# Patient Record
Sex: FEMALE | Race: WHITE | Employment: FULL TIME | URBAN - METROPOLITAN AREA
[De-identification: names, ages, dates, MRNs, and addresses within clinical notes are randomized per-mention and may not be internally consistent; named-entity substitution may affect disease eponyms.]

---

## 2017-04-27 ENCOUNTER — HOSPITAL ENCOUNTER (EMERGENCY)
Age: 42
Discharge: HOME | End: 2017-04-27
Attending: EMERGENCY MEDICINE
Payer: MEDICAID

## 2017-04-27 VITALS
WEIGHT: 150 LBS | HEIGHT: 66 IN | DIASTOLIC BLOOD PRESSURE: 68 MMHG | OXYGEN SATURATION: 99 % | TEMPERATURE: 98.2 F | BODY MASS INDEX: 24.11 KG/M2 | RESPIRATION RATE: 18 BRPM | SYSTOLIC BLOOD PRESSURE: 146 MMHG | HEART RATE: 89 BPM

## 2017-04-27 DIAGNOSIS — R33.9 URINARY RETENTION: Primary | ICD-10-CM

## 2017-04-27 LAB
APPEARANCE UR: ABNORMAL
BACTERIA URNS QL MICRO: ABNORMAL /HPF
BILIRUB UR QL: NEGATIVE
CASTS URNS QL MICRO: 0 /LPF
COLOR UR: YELLOW
CRYSTALS URNS QL MICRO: 0 /LPF
EPI CELLS #/AREA URNS HPF: 0 /HPF
GLUCOSE UR STRIP.AUTO-MCNC: >1000 MG/DL
HGB UR QL STRIP: ABNORMAL
KETONES UR QL STRIP.AUTO: NEGATIVE MG/DL
LEUKOCYTE ESTERASE UR QL STRIP.AUTO: NEGATIVE
MUCOUS THREADS URNS QL MICRO: 0 /LPF
NITRITE UR QL STRIP.AUTO: NEGATIVE
OTHER OBSERVATIONS,UCOM: ABNORMAL
PH UR STRIP: 6.5 [PH] (ref 5–9)
PROT UR STRIP-MCNC: NEGATIVE MG/DL
RBC #/AREA URNS HPF: ABNORMAL /HPF
SP GR UR REFRACTOMETRY: 1.04 (ref 1–1.02)
UROBILINOGEN UR QL STRIP.AUTO: 0.2 EU/DL (ref 0.2–1)
WBC URNS QL MICRO: ABNORMAL /HPF

## 2017-04-27 PROCEDURE — 87086 URINE CULTURE/COLONY COUNT: CPT | Performed by: EMERGENCY MEDICINE

## 2017-04-27 PROCEDURE — 81015 MICROSCOPIC EXAM OF URINE: CPT | Performed by: EMERGENCY MEDICINE

## 2017-04-27 PROCEDURE — 74011250637 HC RX REV CODE- 250/637: Performed by: EMERGENCY MEDICINE

## 2017-04-27 PROCEDURE — 99283 EMERGENCY DEPT VISIT LOW MDM: CPT | Performed by: EMERGENCY MEDICINE

## 2017-04-27 PROCEDURE — 81001 URINALYSIS AUTO W/SCOPE: CPT | Performed by: EMERGENCY MEDICINE

## 2017-04-27 PROCEDURE — 51702 INSERT TEMP BLADDER CATH: CPT | Performed by: EMERGENCY MEDICINE

## 2017-04-27 PROCEDURE — 77030005515 HC CATH URETH FOL14 BARD -B

## 2017-04-27 PROCEDURE — 51798 US URINE CAPACITY MEASURE: CPT | Performed by: EMERGENCY MEDICINE

## 2017-04-27 RX ORDER — OXYCODONE HYDROCHLORIDE 5 MG/1
5 TABLET ORAL
Status: COMPLETED | OUTPATIENT
Start: 2017-04-27 | End: 2017-04-27

## 2017-04-27 RX ORDER — PHENAZOPYRIDINE HYDROCHLORIDE 95 MG/1
200 TABLET ORAL
Status: COMPLETED | OUTPATIENT
Start: 2017-04-27 | End: 2017-04-27

## 2017-04-27 RX ORDER — SULFAMETHOXAZOLE AND TRIMETHOPRIM 800; 160 MG/1; MG/1
1 TABLET ORAL 2 TIMES DAILY
Qty: 14 TAB | Refills: 0 | Status: SHIPPED | OUTPATIENT
Start: 2017-04-27 | End: 2017-05-04

## 2017-04-27 RX ORDER — SULFAMETHOXAZOLE AND TRIMETHOPRIM 800; 160 MG/1; MG/1
1 TABLET ORAL
Status: COMPLETED | OUTPATIENT
Start: 2017-04-27 | End: 2017-04-27

## 2017-04-27 RX ORDER — PHENAZOPYRIDINE HYDROCHLORIDE 200 MG/1
200 TABLET, FILM COATED ORAL 3 TIMES DAILY
Qty: 6 TAB | Refills: 0 | Status: SHIPPED | OUTPATIENT
Start: 2017-04-27 | End: 2017-04-29

## 2017-04-27 RX ADMIN — OXYCODONE HYDROCHLORIDE 5 MG: 5 TABLET ORAL at 14:04

## 2017-04-27 RX ADMIN — URINARY PAIN RELIEF 190 MG: 95 TABLET ORAL at 14:05

## 2017-04-27 RX ADMIN — SULFAMETHOXAZOLE AND TRIMETHOPRIM 1 TABLET: 800; 160 TABLET ORAL at 14:05

## 2017-04-27 NOTE — DISCHARGE INSTRUCTIONS
Urinary Retention: Care Instructions  Your Care Instructions    Urinary retention means that you aren't able to urinate. In men, it is often caused by a blockage of the urinary tract from an enlarged prostate gland. In men and women, it can also be caused by an infection or nerve damage. Or it may be a side effect of a medicine. The doctor may have drained the urine from your bladder. If you still have problems passing urine, you may need to use a catheter at home. This is used to empty your bladder until the problem can be fixed. Your doctor may put a catheter in your bladder before you go home. If so, he or she will tell you when to come back to have the catheter removed. The doctor has checked you closely. But problems can develop later. If you notice any problems or new symptoms, get medical treatment right away. Follow-up care is a key part of your treatment and safety. Be sure to make and go to all appointments, and call your doctor if you are having problems. It's also a good idea to know your test results and keep a list of the medicines you take. How can you care for yourself at home? · Take your medicines exactly as prescribed. Call your doctor if you think you are having a problem with your medicine. You will get more details on the specific medicines your doctor prescribes. · Check with your doctor before you use any over-the-counter medicines. Many cold and allergy medicines, for example, can make this problem worse. Make sure your doctor knows all of the medicines, vitamins, supplements, and herbal remedies you take. · Spread out through the day the amount of fluid you drink. Do not drink a lot at bedtime. · Avoid alcohol and caffeine. · If you have been given a catheter, or if one is already in place, follow the instructions you were given. Always wash your hands before and after you handle the catheter. When should you call for help?   Call your doctor now or seek immediate medical care if:  · You cannot urinate at all, or it is getting harder to urinate. · You have symptoms of a urinary tract infection. These may include:  ¨ Pain or burning when you urinate. ¨ A frequent need to urinate without being able to pass much urine. ¨ Pain in the flank, which is just below the rib cage and above the waist on either side of the back. ¨ Blood in your urine. ¨ A fever. Watch closely for changes in your health, and be sure to contact your doctor if:  · You have any problems with your catheter. · You do not get better as expected. Where can you learn more? Go to http://jose carlos-beka.info/. Enter M244 in the search box to learn more about \"Urinary Retention: Care Instructions. \"  Current as of: August 12, 2016  Content Version: 11.2  © 1365-0961 [employer]. Care instructions adapted under license by [employer] (which disclaims liability or warranty for this information). If you have questions about a medical condition or this instruction, always ask your healthcare professional. [place] [address] any warranty or liability for your use of this information.

## 2017-04-27 NOTE — ED PROVIDER NOTES
Patient is a 35 y.o. female presenting with inability to urinate. The history is provided by the patient. Urinary Retention    This is a recurrent problem. The current episode started yesterday. The problem occurs constantly. The problem has been gradually worsening. Associated with: patient with prior episodes of urinary retention. The pain is located in the suprapubic region. The quality of the pain is aching and pressure-like. The pain is moderate. Pertinent negatives include no fever, no diarrhea, no flatus, no melena, no nausea, no vomiting, no constipation, no dysuria, no frequency, no hematuria, no headaches, no arthralgias, no myalgias and no chest pain. The pain is worsened by activity. The pain is relieved by nothing. Past medical history comments: history of urinary retention has required Moody catheterization in the past. The patient's surgical history non-contributory. Past Medical History:   Diagnosis Date    Bicuspid aortic valve     CAD (coronary artery disease)     Chronic pain 3/14/2016    Cigarette smoker 11/22/2016    Hypertension     Insomnia 3/14/2016    Obesity (BMI 30-39.9) 3/14/2016    Psychiatric disorder     anxiety , ADHD    Unspecified adverse effect of anesthesia     high anxiety after surgery, hiper       Past Surgical History:   Procedure Laterality Date    HX HYSTERECTOMY           Family History:   Problem Relation Age of Onset    Other Mother      bipolar disorder    COPD Mother     Diabetes Father     Hypertension Father     Diabetes Sister        Social History     Social History    Marital status:      Spouse name: N/A    Number of children: N/A    Years of education: N/A     Occupational History    Not on file.      Social History Main Topics    Smoking status: Current Every Day Smoker     Packs/day: 0.50    Smokeless tobacco: Never Used    Alcohol use No    Drug use: No    Sexual activity: Not on file     Other Topics Concern    Not on file     Social History Narrative    , not working. 2 children, 5 grandchildren         ALLERGIES: Lortab [hydrocodone-acetaminophen]    Review of Systems   Constitutional: Negative for chills and fever. HENT: Negative for congestion, ear pain and rhinorrhea. Eyes: Negative for photophobia and discharge. Respiratory: Negative for cough and shortness of breath. Cardiovascular: Negative for chest pain and palpitations. Gastrointestinal: Positive for abdominal distention and abdominal pain. Negative for constipation, diarrhea, flatus, melena, nausea and vomiting. Endocrine: Negative for cold intolerance and heat intolerance. Genitourinary: Positive for difficulty urinating. Negative for dysuria, enuresis, flank pain, frequency and hematuria. Musculoskeletal: Negative for arthralgias, myalgias and neck pain. Skin: Negative for rash and wound. Allergic/Immunologic: Negative for environmental allergies and food allergies. Neurological: Negative for syncope and headaches. Hematological: Negative for adenopathy. Does not bruise/bleed easily. Psychiatric/Behavioral: Negative for dysphoric mood. The patient is not nervous/anxious. All other systems reviewed and are negative. Vitals:    04/27/17 1226 04/27/17 1312   BP: 152/84    Pulse: 90    Resp: 16    Temp: 98.9 °F (37.2 °C)    SpO2: 99% 98%   Weight: 68 kg (150 lb)    Height: 5' 6\" (1.676 m)             Physical Exam   Constitutional: She is oriented to person, place, and time. She appears well-developed and well-nourished. She appears distressed. HENT:   Head: Normocephalic and atraumatic. Mouth/Throat: Oropharynx is clear and moist.   Eyes: Conjunctivae and EOM are normal. Pupils are equal, round, and reactive to light. Right eye exhibits no discharge. Left eye exhibits no discharge. No scleral icterus. Neck: Normal range of motion. Neck supple. No thyromegaly present.    Cardiovascular: Normal rate, regular rhythm, normal heart sounds and intact distal pulses. Exam reveals no gallop and no friction rub. No murmur heard. Pulmonary/Chest: Effort normal and breath sounds normal. No respiratory distress. She has no wheezes. She exhibits no tenderness. Abdominal: Soft. Bowel sounds are normal. She exhibits distension. There is no hepatosplenomegaly. There is tenderness. There is no rebound and no guarding. No hernia. Musculoskeletal: Normal range of motion. She exhibits no edema or tenderness. Neurological: She is alert and oriented to person, place, and time. No cranial nerve deficit. She exhibits normal muscle tone. Skin: Skin is warm. No rash noted. She is not diaphoretic. No erythema. Psychiatric: She has a normal mood and affect. Her behavior is normal. Judgment and thought content normal.   Nursing note and vitals reviewed. MDM  Number of Diagnoses or Management Options  Urinary retention: established and worsening  Diagnosis management comments: Bladder scan completed. Greater than 600 mL of urine found. Proceed to Moody catheter  Patient's symptoms improved    Will refer patient back to her primary urologist for reevaluation  Urine culture sent       Amount and/or Complexity of Data Reviewed  Clinical lab tests: ordered and reviewed  Tests in the medicine section of CPT®: ordered and reviewed  Review and summarize past medical records: yes    Risk of Complications, Morbidity, and/or Mortality  Presenting problems: moderate  Diagnostic procedures: moderate  Management options: moderate  General comments: Elements of this note have been dictated via voice recognition software. Text and phrases may be limited by the accuracy of the software. The chart has been reviewed, but errors may still be present.       Patient Progress  Patient progress: improved    ED Course       Procedures

## 2017-04-27 NOTE — ED NOTES
Discharge instructions and prescriptions provided and explained to the pt. Opportunity for questions provided. A signed copy of AVS was placed in chart.

## 2017-04-27 NOTE — ED TRIAGE NOTES
Per patient urinary retention since 0130 this morning, patient hx of urinary retention last time this occurred 2 years ago.

## 2017-04-30 LAB
BACTERIA SPEC CULT: NORMAL
SERVICE CMNT-IMP: NORMAL

## 2017-09-26 ENCOUNTER — APPOINTMENT (OUTPATIENT)
Dept: GENERAL RADIOLOGY | Age: 42
End: 2017-09-26
Payer: SELF-PAY

## 2017-09-26 ENCOUNTER — HOSPITAL ENCOUNTER (EMERGENCY)
Age: 42
Discharge: HOME | End: 2017-09-26
Payer: SELF-PAY

## 2017-09-26 VITALS
RESPIRATION RATE: 16 BRPM | BODY MASS INDEX: 31.39 KG/M2 | HEIGHT: 67 IN | WEIGHT: 200 LBS | DIASTOLIC BLOOD PRESSURE: 84 MMHG | HEART RATE: 78 BPM | TEMPERATURE: 97.4 F | OXYGEN SATURATION: 98 % | SYSTOLIC BLOOD PRESSURE: 133 MMHG

## 2017-09-26 DIAGNOSIS — M62.838 TRAPEZIUS MUSCLE SPASM: Primary | ICD-10-CM

## 2017-09-26 PROCEDURE — 72040 X-RAY EXAM NECK SPINE 2-3 VW: CPT

## 2017-09-26 PROCEDURE — 99284 EMERGENCY DEPT VISIT MOD MDM: CPT

## 2017-09-26 PROCEDURE — 75810000123 HC INJ'S ANES/STEROID AGT PERIPH NERVE

## 2017-09-26 PROCEDURE — 74011000250 HC RX REV CODE- 250

## 2017-09-26 PROCEDURE — 74011250637 HC RX REV CODE- 250/637

## 2017-09-26 RX ORDER — METFORMIN HYDROCHLORIDE 500 MG/1
500 TABLET ORAL 2 TIMES DAILY WITH MEALS
COMMUNITY
End: 2018-11-12 | Stop reason: SDUPTHER

## 2017-09-26 RX ORDER — HYDROCODONE BITARTRATE AND ACETAMINOPHEN 7.5; 325 MG/1; MG/1
1 TABLET ORAL
Status: COMPLETED | OUTPATIENT
Start: 2017-09-26 | End: 2017-09-26

## 2017-09-26 RX ORDER — NAPROXEN 500 MG/1
500 TABLET ORAL 2 TIMES DAILY WITH MEALS
Qty: 20 TAB | Refills: 0 | Status: SHIPPED | OUTPATIENT
Start: 2017-09-26 | End: 2017-10-06

## 2017-09-26 RX ORDER — TAMSULOSIN HYDROCHLORIDE 0.4 MG/1
0.4 CAPSULE ORAL DAILY
COMMUNITY
End: 2018-11-12 | Stop reason: ALTCHOICE

## 2017-09-26 RX ORDER — INSULIN ASPART 100 [IU]/ML
20 INJECTION, SUSPENSION SUBCUTANEOUS 2 TIMES DAILY
COMMUNITY
End: 2019-06-24 | Stop reason: ALTCHOICE

## 2017-09-26 RX ORDER — LIDOCAINE HYDROCHLORIDE 20 MG/ML
10 INJECTION, SOLUTION INFILTRATION; PERINEURAL
Status: COMPLETED | OUTPATIENT
Start: 2017-09-26 | End: 2017-09-26

## 2017-09-26 RX ORDER — CELECOXIB 200 MG/1
200 CAPSULE ORAL 2 TIMES DAILY
COMMUNITY
End: 2018-11-12 | Stop reason: ALTCHOICE

## 2017-09-26 RX ORDER — METHOCARBAMOL 750 MG/1
750 TABLET, FILM COATED ORAL 4 TIMES DAILY
Qty: 15 TAB | Refills: 0 | Status: SHIPPED | OUTPATIENT
Start: 2017-09-26 | End: 2018-11-12 | Stop reason: ALTCHOICE

## 2017-09-26 RX ORDER — ATORVASTATIN CALCIUM 20 MG/1
20 TABLET, FILM COATED ORAL DAILY
COMMUNITY
End: 2019-06-24 | Stop reason: ALTCHOICE

## 2017-09-26 RX ORDER — DIAZEPAM 5 MG/1
5 TABLET ORAL
Status: COMPLETED | OUTPATIENT
Start: 2017-09-26 | End: 2017-09-26

## 2017-09-26 RX ADMIN — HYDROCODONE BITARTRATE AND ACETAMINOPHEN 1 TABLET: 7.5; 325 TABLET ORAL at 10:43

## 2017-09-26 RX ADMIN — DIAZEPAM 5 MG: 5 TABLET ORAL at 10:42

## 2017-09-26 RX ADMIN — LIDOCAINE HYDROCHLORIDE 200 MG: 20 INJECTION, SOLUTION INFILTRATION; PERINEURAL at 10:37

## 2017-09-26 NOTE — ED PROVIDER NOTES
Patient is a 35 y.o. female presenting with shoulder pain. The history is provided by the patient. Shoulder Pain    The incident occurred more than 2 days ago. The incident occurred at home. There was no injury mechanism. The right shoulder is affected. The pain is at a severity of 10/10. The pain is severe. The pain has been constant since onset. The pain radiates. There is no history of shoulder injury. Associated symptoms include tingling. Past Medical History:   Diagnosis Date    Bicuspid aortic valve     CAD (coronary artery disease)     Chronic pain 3/14/2016    Cigarette smoker 11/22/2016    Hypertension     Insomnia 3/14/2016    Obesity (BMI 30-39.9) 3/14/2016    Psychiatric disorder     anxiety , ADHD    Unspecified adverse effect of anesthesia     high anxiety after surgery, hiper       Past Surgical History:   Procedure Laterality Date    HX HYSTERECTOMY           Family History:   Problem Relation Age of Onset    Other Mother      bipolar disorder    COPD Mother     Diabetes Father     Hypertension Father     Diabetes Sister        Social History     Social History    Marital status:      Spouse name: N/A    Number of children: N/A    Years of education: N/A     Occupational History    Not on file. Social History Main Topics    Smoking status: Current Every Day Smoker     Packs/day: 0.50    Smokeless tobacco: Never Used    Alcohol use No    Drug use: No    Sexual activity: Not on file     Other Topics Concern    Not on file     Social History Narrative    , not working. 2 children, 5 grandchildren         ALLERGIES: Lortab [hydrocodone-acetaminophen]    Review of Systems   Constitutional: Negative. Negative for activity change. HENT: Negative. Eyes: Negative. Respiratory: Negative. Cardiovascular: Negative. Gastrointestinal: Negative. Genitourinary: Negative. Musculoskeletal: Negative. Skin: Negative.     Neurological: Positive for tingling. Psychiatric/Behavioral: Negative. All other systems reviewed and are negative. Vitals:    09/26/17 1017   BP: 154/85   Pulse: 89   Resp: 20   Temp: 97.4 °F (36.3 °C)   SpO2: 100%   Weight: 90.7 kg (200 lb)   Height: 5' 7\" (1.702 m)            Physical Exam   Constitutional: She is oriented to person, place, and time. She appears well-developed and well-nourished. No distress. HENT:   Head: Normocephalic and atraumatic. Right Ear: External ear normal.   Left Ear: External ear normal.   Nose: Nose normal.   Mouth/Throat: Oropharynx is clear and moist. No oropharyngeal exudate. Eyes: Conjunctivae and EOM are normal. Pupils are equal, round, and reactive to light. Right eye exhibits no discharge. Left eye exhibits no discharge. No scleral icterus. Neck: Normal range of motion. Neck supple. No JVD present. No tracheal deviation present. Cardiovascular: Normal rate, regular rhythm and intact distal pulses. Pulmonary/Chest: Effort normal and breath sounds normal. No stridor. No respiratory distress. She has no wheezes. She exhibits no tenderness. Abdominal: Soft. Bowel sounds are normal. She exhibits no distension and no mass. There is no tenderness. Musculoskeletal: Normal range of motion. She exhibits no edema. Cervical back: She exhibits tenderness, pain and spasm. Back:    Neurological: She is alert and oriented to person, place, and time. No cranial nerve deficit. Skin: Skin is warm and dry. No rash noted. She is not diaphoretic. No erythema. No pallor. Psychiatric: She has a normal mood and affect. Her behavior is normal. Thought content normal.   Nursing note and vitals reviewed. MDM  Number of Diagnoses or Management Options  Diagnosis management comments: Muscle quite tight and tender trapezius muscle lidocaine injection was provided the patient for possible relief. C-spine is normal patient be started on anti-inflammatories and muscle relaxers. Follow-up with physical therapy       Amount and/or Complexity of Data Reviewed  Tests in the radiology section of CPT®: ordered and reviewed      ED Course       Trigger point injection  Date/Time: 9/26/2017 10:54 AM  Performed by: Denise Davis  Authorized by: Denise Davis     Consent:     Consent obtained:  Verbal    Consent given by:  Patient    Risks discussed:  Bleeding, infection, nerve damage and pain    Alternatives discussed:  No treatment  Indications:     Indications:  Uscle pain and spasm  Pre-procedure details:     Skin preparation:  Antiseptic wash    Preparation: Patient was prepped and draped in the usual sterile fashion    Anesthesia (see MAR for exact dosages): Anesthesia method:  None  Post-procedure details:     Patient tolerance of procedure: Tolerated well, no immediate complications  Comments:      Trapezius muscle injected with we'll injection patient did get some relief from pain.

## 2017-09-26 NOTE — ED TRIAGE NOTES
Peace Leonard is a 35 y.o. female here for right sided neck pain that started Sunday night. She states she had a sharp pain in her right shoulder and it radiates into her neck. She denies known injury. Patient noted to have right sided muscular  tenderness on examination. Patient is alert and oriented she is answering question appropriately. Rapid assessment performed. --- Orders were not placed. --- Patient will be roomed.    Signed By: MARTINA Rogel     September 26, 2017

## 2017-09-26 NOTE — DISCHARGE INSTRUCTIONS
Neck Spasm: Care Instructions  Your Care Instructions  A neck spasm is sudden tightness and pain in your neck muscles. A spasm may be caused by some activities or repeated movements. For example, you may be more likely to have a neck spasm if you slouch, paint a ceiling, work at a computer, or sleep with your neck twisted. But the cause isn't always clear. Home treatment includes using heat or ice, taking over-the-counter (OTC) pain medicines, and avoiding activities that may lead to neck pain. Gentle stretching, or treatments such as massage or manipulation, may also help ease a neck spasm. For a neck spasm that doesn't get better with home care, your doctor may prescribe medicine. He or she may also suggest exercise or physical therapy to help strengthen or relax your neck muscles. Follow-up care is a key part of your treatment and safety. Be sure to make and go to all appointments, and call your doctor if you are having problems. It's also a good idea to know your test results and keep a list of the medicines you take. How can you care for yourself at home? · To relieve pain, use heat or ice (whichever feels better) on the affected area. ¨ Put a warm water bottle, a heating pad set on low, or a warm cloth on your neck. Put a thin cloth between the heating pad and your skin. Do not go to sleep with a heating pad on your skin. ¨ Try ice or a cold pack on the area for 10 to 20 minutes at a time. Put a thin cloth between the ice and your skin. · Ask your doctor if you can take acetaminophen (such as Tylenol) or nonsteroidal anti-inflammatory drugs, such as ibuprofen or naproxen. Your doctor can prescribe stronger medicines if needed. Be safe with medicines. Read and follow all instructions on the label. · Stretch your muscles every day, especially before and after exercise and at bedtime. Regular stretching can help relax your muscles.   · Try to find a pillow and a position in bed that help improve your night's rest.  · Try to stay active. It's best to start activity slowly. If an exercise makes your pain worse, stop doing it. When should you call for help? Call 911 anytime you think you may need emergency care. For example, call if:  · You are unable to move an arm or a leg at all. Call your doctor now or seek immediate medical care if:  · You have new or worse symptoms in your arms, legs, belly, or buttocks. Symptoms may include:  ¨ Numbness or tingling. ¨ Weakness. ¨ Pain. · You lose bladder or bowel control. Watch closely for changes in your health, and be sure to contact your doctor if:  · You do not get better as expected. Where can you learn more? Go to http://jose carlos-beka.info/. Enter G184 in the search box to learn more about \"Neck Spasm: Care Instructions. \"  Current as of: March 21, 2017  Content Version: 11.3  © 8223-9972 [employer]. Care instructions adapted under license by [employer] (which disclaims liability or warranty for this information). If you have questions about a medical condition or this instruction, always ask your healthcare professional. [address] any warranty or liability for your use of this information.

## 2017-09-26 NOTE — ED NOTES
I have reviewed discharge instructions with the patient and spouse. The patient and spouse verbalized understanding. The patient is ambulatory upon exit and is in no acute distress. The patient has discharge instructions and information about where prescriptions were sent. The patient is being driven home by  is they do not have any questions at this time.

## 2017-09-26 NOTE — ED TRIAGE NOTES
Pt states that on Sunday night had a pain from her right shoulder to her neck and head - pt denies any falls or injuries - pt speaking in complete sentences and ambulatory without difficulty

## 2018-11-12 PROBLEM — Z79.4 CONTROLLED TYPE 2 DIABETES MELLITUS WITHOUT COMPLICATION, WITH LONG-TERM CURRENT USE OF INSULIN (HCC): Status: ACTIVE | Noted: 2018-11-12

## 2018-11-12 PROBLEM — E11.9 CONTROLLED TYPE 2 DIABETES MELLITUS WITHOUT COMPLICATION, WITH LONG-TERM CURRENT USE OF INSULIN (HCC): Status: ACTIVE | Noted: 2018-11-12

## 2018-11-12 PROBLEM — E78.00 HYPERCHOLESTEROLEMIA: Status: ACTIVE | Noted: 2018-11-12

## 2018-11-21 ENCOUNTER — HOSPITAL ENCOUNTER (OUTPATIENT)
Dept: NON INVASIVE DIAGNOSTICS | Age: 43
Discharge: HOME | End: 2018-11-21
Attending: INTERNAL MEDICINE
Payer: MEDICARE

## 2018-11-21 DIAGNOSIS — Q23.1 BICUSPID AORTIC VALVE: ICD-10-CM

## 2018-11-21 PROCEDURE — 93306 TTE W/DOPPLER COMPLETE: CPT

## 2018-11-26 ENCOUNTER — HOSPITAL ENCOUNTER (OUTPATIENT)
Dept: CT IMAGING | Age: 43
Discharge: HOME | End: 2018-11-26
Attending: INTERNAL MEDICINE
Payer: MEDICARE

## 2018-11-26 DIAGNOSIS — I71.21 ASCENDING AORTIC ANEURYSM: ICD-10-CM

## 2018-11-26 PROCEDURE — 74011000258 HC RX REV CODE- 258

## 2018-11-26 PROCEDURE — 74011636320 HC RX REV CODE- 636/320

## 2018-11-26 PROCEDURE — 71275 CT ANGIOGRAPHY CHEST: CPT

## 2018-11-26 RX ORDER — SODIUM CHLORIDE 0.9 % (FLUSH) 0.9 %
10 SYRINGE (ML) INJECTION
Status: COMPLETED | OUTPATIENT
Start: 2018-11-26 | End: 2018-11-26

## 2018-11-26 RX ADMIN — SODIUM CHLORIDE 100 ML: 900 INJECTION, SOLUTION INTRAVENOUS at 08:09

## 2018-11-26 RX ADMIN — Medication 10 ML: at 08:09

## 2018-11-26 RX ADMIN — IOPAMIDOL 119 ML: 755 INJECTION, SOLUTION INTRAVENOUS at 08:09

## 2018-11-26 NOTE — PROGRESS NOTES
Please let her know that the CT of her chest showed that the aneuysm was unchanged which is good news. However, she needs to re-establish with cardiothoracic surgery. Thanks!

## 2018-11-28 NOTE — PROGRESS NOTES
Attempted to contact patient but subscriber is not accepting calls per recording. No VM option given.

## 2018-11-29 NOTE — PROGRESS NOTES
Patient notified of results and verbalized understanding. States she saw her cardio-thoracic surgeon yesterday.

## 2019-07-18 ENCOUNTER — HOSPITAL ENCOUNTER (OUTPATIENT)
Dept: MRI IMAGING | Age: 44
Discharge: HOME | End: 2019-07-18
Attending: FAMILY MEDICINE
Payer: MEDICARE

## 2019-07-18 DIAGNOSIS — G43.809 OTHER MIGRAINE WITHOUT STATUS MIGRAINOSUS, NOT INTRACTABLE: ICD-10-CM

## 2019-07-18 DIAGNOSIS — R11.0 NAUSEA: ICD-10-CM

## 2019-07-18 DIAGNOSIS — R63.4 WEIGHT LOSS: ICD-10-CM

## 2019-07-18 DIAGNOSIS — I10 ESSENTIAL HYPERTENSION: ICD-10-CM

## 2019-07-18 PROCEDURE — 70551 MRI BRAIN STEM W/O DYE: CPT

## 2019-07-18 NOTE — PROGRESS NOTES
She has some minor findings on her brain MRI that I need to discuss with her by phone; please contact the patient and let me know and I need to talk to her; she will most likely need to see a neurologist; thank you, JF

## 2019-07-22 ENCOUNTER — HOSPITAL ENCOUNTER (OUTPATIENT)
Dept: LAB | Age: 44
Discharge: HOME | End: 2019-07-22
Payer: MEDICARE

## 2019-07-22 DIAGNOSIS — D69.6 THROMBOCYTOPENIA (HCC): ICD-10-CM

## 2019-07-22 DIAGNOSIS — R63.4 UNINTENTIONAL WEIGHT LOSS: ICD-10-CM

## 2019-07-22 LAB
ALBUMIN SERPL-MCNC: 4.2 G/DL (ref 3.5–5)
ALBUMIN/GLOB SERPL: 1.4 {RATIO} (ref 1.2–3.5)
ALP SERPL-CCNC: 89 U/L (ref 50–136)
ALT SERPL-CCNC: 15 U/L (ref 12–65)
ANION GAP SERPL CALC-SCNC: 5 MMOL/L (ref 7–16)
APTT PPP: 30 SEC (ref 24.7–39.8)
AST SERPL-CCNC: 10 U/L (ref 15–37)
BASOPHILS # BLD: 0.1 K/UL (ref 0–0.2)
BASOPHILS NFR BLD: 1 % (ref 0–2)
BILIRUB SERPL-MCNC: 0.3 MG/DL (ref 0.2–1.1)
BUN SERPL-MCNC: 15 MG/DL (ref 6–23)
CALCIUM SERPL-MCNC: 8.9 MG/DL (ref 8.3–10.4)
CHLORIDE SERPL-SCNC: 109 MMOL/L (ref 98–107)
CO2 SERPL-SCNC: 26 MMOL/L (ref 21–32)
CREAT SERPL-MCNC: 0.75 MG/DL (ref 0.6–1)
DIFFERENTIAL METHOD BLD: ABNORMAL
EOSINOPHIL # BLD: 0.3 K/UL (ref 0–0.8)
EOSINOPHIL NFR BLD: 4 % (ref 0.5–7.8)
ERYTHROCYTE [DISTWIDTH] IN BLOOD BY AUTOMATED COUNT: 12.4 % (ref 11.9–14.6)
FIBRINOGEN PPP-MCNC: 377 MG/DL (ref 190–501)
GLOBULIN SER CALC-MCNC: 2.9 G/DL (ref 2.3–3.5)
GLUCOSE SERPL-MCNC: 97 MG/DL (ref 65–100)
HCT VFR BLD AUTO: 40.7 % (ref 35.8–46.3)
HGB BLD-MCNC: 14 G/DL (ref 11.7–15.4)
IMM GRANULOCYTES # BLD AUTO: 0 K/UL (ref 0–0.5)
IMM GRANULOCYTES NFR BLD AUTO: 0 % (ref 0–5)
INR PPP: 1
LYMPHOCYTES # BLD: 1.7 K/UL (ref 0.5–4.6)
LYMPHOCYTES NFR BLD: 24 % (ref 13–44)
MCH RBC QN AUTO: 33.2 PG (ref 26.1–32.9)
MCHC RBC AUTO-ENTMCNC: 34.4 G/DL (ref 31.4–35)
MCV RBC AUTO: 96.4 FL (ref 79.6–97.8)
MONOCYTES # BLD: 0.4 K/UL (ref 0.1–1.3)
MONOCYTES NFR BLD: 5 % (ref 4–12)
NEUTS SEG # BLD: 4.7 K/UL (ref 1.7–8.2)
NEUTS SEG NFR BLD: 66 % (ref 43–78)
NRBC # BLD: 0 K/UL (ref 0–0.2)
PLATELET # BLD AUTO: 82 K/UL (ref 150–450)
PLATELET COMMENTS,PCOM: ADEQUATE
PMV BLD AUTO: ABNORMAL FL (ref 9.4–12.3)
POTASSIUM SERPL-SCNC: 3.7 MMOL/L (ref 3.5–5.1)
PROT SERPL-MCNC: 7.1 G/DL (ref 6.3–8.2)
PROTHROMBIN TIME: 12.9 SEC (ref 11.7–14.5)
RBC # BLD AUTO: 4.22 M/UL (ref 4.05–5.25)
RBC MORPH BLD: ABNORMAL
SODIUM SERPL-SCNC: 140 MMOL/L (ref 136–145)
T4 FREE SERPL-MCNC: 0.9 NG/DL (ref 0.78–1.46)
TSH SERPL DL<=0.005 MIU/L-ACNC: 1.14 UIU/ML (ref 0.36–3.74)
WBC # BLD AUTO: 7.2 K/UL (ref 4.3–11.1)
WBC MORPH BLD: ABNORMAL

## 2019-07-22 PROCEDURE — 85384 FIBRINOGEN ACTIVITY: CPT

## 2019-07-22 PROCEDURE — 85025 COMPLETE CBC W/AUTO DIFF WBC: CPT

## 2019-07-22 PROCEDURE — 85730 THROMBOPLASTIN TIME PARTIAL: CPT

## 2019-07-22 PROCEDURE — 84165 PROTEIN E-PHORESIS SERUM: CPT

## 2019-07-22 PROCEDURE — 85610 PROTHROMBIN TIME: CPT

## 2019-07-22 PROCEDURE — 80053 COMPREHEN METABOLIC PANEL: CPT

## 2019-07-22 PROCEDURE — 86334 IMMUNOFIX E-PHORESIS SERUM: CPT

## 2019-07-22 PROCEDURE — 84439 ASSAY OF FREE THYROXINE: CPT

## 2019-07-22 PROCEDURE — 36415 COLL VENOUS BLD VENIPUNCTURE: CPT

## 2019-07-22 PROCEDURE — 84443 ASSAY THYROID STIM HORMONE: CPT

## 2019-07-23 LAB
ALBUMIN SERPL ELPH-MCNC: 3.87 G/DL (ref 3.2–5.6)
ALBUMIN/GLOB SERPL: 1.4 {RATIO}
ALPHA1 GLOB SERPL ELPH-MCNC: 0.26 G/DL (ref 0.1–0.4)
ALPHA2 GLOB SERPL ELPH-MCNC: 0.81 G/DL (ref 0.4–1.2)
B-GLOBULIN SERPL QL ELPH: 0.94 G/DL (ref 0.6–1.3)
GAMMA GLOB MFR SERPL ELPH: 0.83 G/DL (ref 0.5–1.6)
IGA SERPL-MCNC: 189 MG/DL (ref 85–499)
IGG SERPL-MCNC: 761 MG/DL (ref 610–1616)
IGM SERPL-MCNC: 81 MG/DL (ref 35–242)
M PROTEIN SERPL ELPH-MCNC: NORMAL G/DL
PROT PATTERN SERPL ELPH-IMP: NORMAL
PROT PATTERN SPEC IFE-IMP: NORMAL
PROT SERPL-MCNC: 6.7 G/DL (ref 6.3–8.2)

## 2019-07-24 LAB — PATH REV BLD -IMP: NORMAL

## 2019-08-12 ENCOUNTER — HOSPITAL ENCOUNTER (OUTPATIENT)
Dept: CT IMAGING | Age: 44
Discharge: HOME | End: 2019-08-12
Attending: INTERNAL MEDICINE
Payer: MEDICARE

## 2019-08-12 VITALS — BODY MASS INDEX: 23.86 KG/M2 | WEIGHT: 152 LBS | HEIGHT: 67 IN

## 2019-08-12 DIAGNOSIS — R63.4 UNINTENTIONAL WEIGHT LOSS: ICD-10-CM

## 2019-08-12 DIAGNOSIS — Z03.89 ENCOUNTER FOR OBSERVATION FOR OTHER SUSPECTED DISEASES AND CONDITIONS RULED OUT: ICD-10-CM

## 2019-08-12 PROCEDURE — 74011000258 HC RX REV CODE- 258: Performed by: INTERNAL MEDICINE

## 2019-08-12 PROCEDURE — 74177 CT ABD & PELVIS W/CONTRAST: CPT

## 2019-08-12 PROCEDURE — 74011636320 HC RX REV CODE- 636/320: Performed by: INTERNAL MEDICINE

## 2019-08-12 RX ORDER — SODIUM CHLORIDE 0.9 % (FLUSH) 0.9 %
10 SYRINGE (ML) INJECTION
Status: COMPLETED | OUTPATIENT
Start: 2019-08-12 | End: 2019-08-12

## 2019-08-12 RX ADMIN — DIATRIZOATE MEGLUMINE AND DIATRIZOATE SODIUM 15 ML: 660; 100 LIQUID ORAL; RECTAL at 13:28

## 2019-08-12 RX ADMIN — Medication 10 ML: at 13:29

## 2019-08-12 RX ADMIN — IOPAMIDOL 100 ML: 755 INJECTION, SOLUTION INTRAVENOUS at 13:29

## 2019-08-12 RX ADMIN — SODIUM CHLORIDE 100 ML: 900 INJECTION, SOLUTION INTRAVENOUS at 13:29

## 2019-08-19 ENCOUNTER — HOSPITAL ENCOUNTER (OUTPATIENT)
Dept: LAB | Age: 44
Discharge: HOME | End: 2019-08-19
Payer: MEDICARE

## 2019-08-19 DIAGNOSIS — D69.6 THROMBOCYTOPENIA (HCC): ICD-10-CM

## 2019-08-19 LAB
ALBUMIN SERPL-MCNC: 3.8 G/DL (ref 3.5–5)
ALBUMIN/GLOB SERPL: 1.3 {RATIO} (ref 1.2–3.5)
ALP SERPL-CCNC: 87 U/L (ref 50–136)
ALT SERPL-CCNC: 14 U/L (ref 12–65)
ANION GAP SERPL CALC-SCNC: 5 MMOL/L (ref 7–16)
AST SERPL-CCNC: 13 U/L (ref 15–37)
BASOPHILS # BLD: 0.1 K/UL (ref 0–0.2)
BASOPHILS NFR BLD: 1 % (ref 0–2)
BILIRUB SERPL-MCNC: 0.3 MG/DL (ref 0.2–1.1)
BUN SERPL-MCNC: 16 MG/DL (ref 6–23)
CALCIUM SERPL-MCNC: 9.1 MG/DL (ref 8.3–10.4)
CHLORIDE SERPL-SCNC: 110 MMOL/L (ref 98–107)
CO2 SERPL-SCNC: 26 MMOL/L (ref 21–32)
CREAT SERPL-MCNC: 0.8 MG/DL (ref 0.6–1)
DIFFERENTIAL METHOD BLD: ABNORMAL
EOSINOPHIL # BLD: 0.6 K/UL (ref 0–0.8)
EOSINOPHIL NFR BLD: 5 % (ref 0.5–7.8)
ERYTHROCYTE [DISTWIDTH] IN BLOOD BY AUTOMATED COUNT: 12.2 % (ref 11.9–14.6)
ERYTHROCYTE [SEDIMENTATION RATE] IN BLOOD: 13 MM/HR (ref 0–20)
GLOBULIN SER CALC-MCNC: 3 G/DL (ref 2.3–3.5)
GLUCOSE SERPL-MCNC: 112 MG/DL (ref 65–100)
HCT VFR BLD AUTO: 42.3 % (ref 35.8–46.3)
HGB BLD-MCNC: 14.6 G/DL (ref 11.7–15.4)
IMM GRANULOCYTES # BLD AUTO: 0 K/UL (ref 0–0.5)
IMM GRANULOCYTES NFR BLD AUTO: 0 % (ref 0–5)
LYMPHOCYTES # BLD: 2.9 K/UL (ref 0.5–4.6)
LYMPHOCYTES NFR BLD: 26 % (ref 13–44)
MCH RBC QN AUTO: 33.3 PG (ref 26.1–32.9)
MCHC RBC AUTO-ENTMCNC: 34.5 G/DL (ref 31.4–35)
MCV RBC AUTO: 96.4 FL (ref 79.6–97.8)
MONOCYTES # BLD: 0.8 K/UL (ref 0.1–1.3)
MONOCYTES NFR BLD: 7 % (ref 4–12)
NEUTS SEG # BLD: 6.7 K/UL (ref 1.7–8.2)
NEUTS SEG NFR BLD: 61 % (ref 43–78)
NRBC # BLD: 0 K/UL (ref 0–0.2)
PLATELET # BLD AUTO: 131 K/UL (ref 150–450)
PLATELET COMMENTS,PCOM: ADEQUATE
PMV BLD AUTO: ABNORMAL FL (ref 9.4–12.3)
POTASSIUM SERPL-SCNC: 3.6 MMOL/L (ref 3.5–5.1)
PROT SERPL-MCNC: 6.8 G/DL (ref 6.3–8.2)
RBC # BLD AUTO: 4.39 M/UL (ref 4.05–5.25)
RBC MORPH BLD: ABNORMAL
SODIUM SERPL-SCNC: 141 MMOL/L (ref 136–145)
WBC # BLD AUTO: 11.1 K/UL (ref 4.3–11.1)
WBC MORPH BLD: ABNORMAL

## 2019-08-19 PROCEDURE — 36415 COLL VENOUS BLD VENIPUNCTURE: CPT

## 2019-08-19 PROCEDURE — 86038 ANTINUCLEAR ANTIBODIES: CPT

## 2019-08-19 PROCEDURE — 85652 RBC SED RATE AUTOMATED: CPT

## 2019-08-19 PROCEDURE — 80053 COMPREHEN METABOLIC PANEL: CPT

## 2019-08-19 PROCEDURE — 85025 COMPLETE CBC W/AUTO DIFF WBC: CPT

## 2019-08-20 LAB — ANA SER QL: NEGATIVE

## 2019-09-05 ENCOUNTER — HOSPITAL ENCOUNTER (OUTPATIENT)
Dept: MAMMOGRAPHY | Age: 44
Discharge: HOME | End: 2019-09-05
Attending: FAMILY MEDICINE
Payer: MEDICARE

## 2019-09-05 DIAGNOSIS — Z12.31 SCREENING MAMMOGRAM, ENCOUNTER FOR: ICD-10-CM

## 2019-09-05 PROCEDURE — 77067 SCR MAMMO BI INCL CAD: CPT

## 2019-11-04 PROBLEM — E11.51 TYPE 2 DIABETES MELLITUS WITH PERIPHERAL VASCULAR DISEASE (HCC): Status: ACTIVE | Noted: 2019-11-04

## 2020-03-16 ENCOUNTER — HOSPITAL ENCOUNTER (OUTPATIENT)
Dept: LAB | Age: 45
Discharge: HOME | End: 2020-03-16
Payer: MEDICARE

## 2020-03-16 DIAGNOSIS — D69.6 THROMBOCYTOPENIA (HCC): ICD-10-CM

## 2020-03-16 LAB
ALBUMIN SERPL-MCNC: 4 G/DL (ref 3.5–5)
ALBUMIN/GLOB SERPL: 1.3 {RATIO} (ref 1.2–3.5)
ALP SERPL-CCNC: 92 U/L (ref 50–136)
ALT SERPL-CCNC: 16 U/L (ref 12–65)
ANION GAP SERPL CALC-SCNC: 4 MMOL/L (ref 7–16)
AST SERPL-CCNC: 12 U/L (ref 15–37)
BASOPHILS # BLD: 0.1 K/UL (ref 0–0.2)
BASOPHILS NFR BLD: 1 % (ref 0–2)
BILIRUB SERPL-MCNC: 0.6 MG/DL (ref 0.2–1.1)
BUN SERPL-MCNC: 18 MG/DL (ref 6–23)
CALCIUM SERPL-MCNC: 8.9 MG/DL (ref 8.3–10.4)
CHLORIDE SERPL-SCNC: 115 MMOL/L (ref 98–107)
CO2 SERPL-SCNC: 23 MMOL/L (ref 21–32)
CREAT SERPL-MCNC: 0.91 MG/DL (ref 0.6–1)
DIFFERENTIAL METHOD BLD: ABNORMAL
EOSINOPHIL # BLD: 0.2 K/UL (ref 0–0.8)
EOSINOPHIL NFR BLD: 3 % (ref 0.5–7.8)
ERYTHROCYTE [DISTWIDTH] IN BLOOD BY AUTOMATED COUNT: 12.2 % (ref 11.9–14.6)
GLOBULIN SER CALC-MCNC: 3 G/DL (ref 2.3–3.5)
GLUCOSE SERPL-MCNC: 89 MG/DL (ref 65–100)
HCT VFR BLD AUTO: 38.9 % (ref 35.8–46.3)
HGB BLD-MCNC: 13.5 G/DL (ref 11.7–15.4)
IMM GRANULOCYTES # BLD AUTO: 0.1 K/UL (ref 0–0.5)
IMM GRANULOCYTES NFR BLD AUTO: 1 % (ref 0–5)
LYMPHOCYTES # BLD: 1.6 K/UL (ref 0.5–4.6)
LYMPHOCYTES NFR BLD: 24 % (ref 13–44)
MCH RBC QN AUTO: 32.9 PG (ref 26.1–32.9)
MCHC RBC AUTO-ENTMCNC: 34.7 G/DL (ref 31.4–35)
MCV RBC AUTO: 94.9 FL (ref 79.6–97.8)
MONOCYTES # BLD: 0.3 K/UL (ref 0.1–1.3)
MONOCYTES NFR BLD: 5 % (ref 4–12)
NEUTS SEG # BLD: 4.2 K/UL (ref 1.7–8.2)
NEUTS SEG NFR BLD: 66 % (ref 43–78)
NRBC # BLD: 0 K/UL (ref 0–0.2)
PLATELET # BLD AUTO: 100 K/UL (ref 150–450)
PLATELET # BLD AUTO: 98 K/UL (ref 150–450)
PLATELET COMMENTS,PCOM: ABNORMAL
PLATELETS.RETICULATED NFR BLD AUTO: 26.3 % (ref 1.8–7.9)
PMV BLD AUTO: ABNORMAL FL (ref 9.4–12.3)
POTASSIUM SERPL-SCNC: 4.7 MMOL/L (ref 3.5–5.1)
PROT SERPL-MCNC: 7 G/DL (ref 6.3–8.2)
RBC # BLD AUTO: 4.1 M/UL (ref 4.05–5.25)
RBC MORPH BLD: ABNORMAL
SODIUM SERPL-SCNC: 142 MMOL/L (ref 136–145)
WBC # BLD AUTO: 6.5 K/UL (ref 4.3–11.1)
WBC MORPH BLD: ABNORMAL

## 2020-03-16 PROCEDURE — 80053 COMPREHEN METABOLIC PANEL: CPT

## 2020-03-16 PROCEDURE — 85025 COMPLETE CBC W/AUTO DIFF WBC: CPT

## 2020-03-16 PROCEDURE — 36415 COLL VENOUS BLD VENIPUNCTURE: CPT

## 2020-03-16 PROCEDURE — 85055 RETICULATED PLATELET ASSAY: CPT

## 2020-03-16 PROCEDURE — 85049 AUTOMATED PLATELET COUNT: CPT

## 2020-05-29 ENCOUNTER — HOSPITAL ENCOUNTER (OUTPATIENT)
Dept: ULTRASOUND IMAGING | Age: 45
Discharge: HOME | End: 2020-05-29
Attending: INTERNAL MEDICINE
Payer: MEDICARE

## 2020-05-29 DIAGNOSIS — D69.6 THROMBOCYTOPENIA (HCC): ICD-10-CM

## 2020-05-29 PROCEDURE — 76700 US EXAM ABDOM COMPLETE: CPT

## 2020-09-14 ENCOUNTER — HOSPITAL ENCOUNTER (EMERGENCY)
Age: 45
Discharge: HOME | End: 2020-09-14
Attending: EMERGENCY MEDICINE
Payer: MEDICARE

## 2020-09-14 VITALS
DIASTOLIC BLOOD PRESSURE: 82 MMHG | RESPIRATION RATE: 16 BRPM | HEIGHT: 66 IN | SYSTOLIC BLOOD PRESSURE: 166 MMHG | WEIGHT: 150 LBS | TEMPERATURE: 98.4 F | HEART RATE: 105 BPM | OXYGEN SATURATION: 95 % | BODY MASS INDEX: 24.11 KG/M2

## 2020-09-14 DIAGNOSIS — G57.01 PIRIFORMIS SYNDROME OF RIGHT SIDE: ICD-10-CM

## 2020-09-14 DIAGNOSIS — M54.16 LUMBAR RADICULOPATHY: ICD-10-CM

## 2020-09-14 DIAGNOSIS — M46.1 INFLAMMATION OF RIGHT SACROILIAC JOINT (HCC): Primary | ICD-10-CM

## 2020-09-14 PROCEDURE — 99283 EMERGENCY DEPT VISIT LOW MDM: CPT

## 2020-09-14 PROCEDURE — 74011000250 HC RX REV CODE- 250: Performed by: EMERGENCY MEDICINE

## 2020-09-14 PROCEDURE — A9270 NON-COVERED ITEM OR SERVICE: HCPCS | Performed by: EMERGENCY MEDICINE

## 2020-09-14 PROCEDURE — 74011250637 HC RX REV CODE- 250/637: Performed by: EMERGENCY MEDICINE

## 2020-09-14 PROCEDURE — 74011636637 HC RX REV CODE- 636/637: Performed by: EMERGENCY MEDICINE

## 2020-09-14 RX ORDER — NAPROXEN 500 MG/1
500 TABLET ORAL 2 TIMES DAILY WITH MEALS
Qty: 20 TAB | Refills: 0 | Status: SHIPPED | OUTPATIENT
Start: 2020-09-14 | End: 2020-09-24

## 2020-09-14 RX ORDER — KETOROLAC TROMETHAMINE 10 MG/1
10 TABLET, FILM COATED ORAL
Status: COMPLETED | OUTPATIENT
Start: 2020-09-14 | End: 2020-09-14

## 2020-09-14 RX ORDER — LIDOCAINE 4 G/100G
1 PATCH TOPICAL EVERY 24 HOURS
Status: DISCONTINUED | OUTPATIENT
Start: 2020-09-14 | End: 2020-09-14 | Stop reason: HOSPADM

## 2020-09-14 RX ORDER — DIAZEPAM 5 MG/1
5 TABLET ORAL
Qty: 20 TAB | Refills: 0 | Status: SHIPPED | OUTPATIENT
Start: 2020-09-14 | End: 2020-10-30 | Stop reason: ALTCHOICE

## 2020-09-14 RX ORDER — LIDOCAINE 50 MG/G
PATCH TOPICAL
Qty: 15 EACH | Refills: 0 | Status: SHIPPED | OUTPATIENT
Start: 2020-09-14 | End: 2021-04-20 | Stop reason: ALTCHOICE

## 2020-09-14 RX ORDER — PREDNISONE 20 MG/1
60 TABLET ORAL DAILY
Qty: 15 TAB | Refills: 0 | Status: SHIPPED | OUTPATIENT
Start: 2020-09-14 | End: 2020-09-19

## 2020-09-14 RX ORDER — DIAZEPAM 5 MG/1
5 TABLET ORAL
Status: COMPLETED | OUTPATIENT
Start: 2020-09-14 | End: 2020-09-14

## 2020-09-14 RX ADMIN — PREDNISONE 60 MG: 10 TABLET ORAL at 11:04

## 2020-09-14 RX ADMIN — DIAZEPAM 5 MG: 5 TABLET ORAL at 11:04

## 2020-09-14 RX ADMIN — KETOROLAC TROMETHAMINE 10 MG: 10 TABLET, FILM COATED ORAL at 11:03

## 2020-09-14 NOTE — ED NOTES
I have reviewed discharge instructions with the patient. The patient verbalized understanding. Patient left ED via Discharge Method: ambulatory to Home with spouse    Opportunity for questions and clarification provided. Patient given 1 scripts, 3 esign        To continue your aftercare when you leave the hospital, you may receive an automated call from our care team to check in on how you are doing. This is a free service and part of our promise to provide the best care and service to meet your aftercare needs.  If you have questions, or wish to unsubscribe from this service please call [phone]. Thank you for Choosing our [facility].

## 2020-09-14 NOTE — ED PROVIDER NOTES
Stephanie Oreilly is a 53 y.o. female seen on 9/14/2020 in the [facility] EMERGENCY DEPT in room ER18/18. Chief Complaint   Patient presents with    Wrist Pain    Leg Pain     HPI: 64-year-old female presented emergency department with complaints of right posterior hip, right buttock and right leg pain that began this weekend. Patient denies any injury. She states that she is just noticed it getting worse throughout the past couple days. Patient also had some mild swelling of her right wrist this weekend that has since resolved. Patient states that she tried to go to work today but she was having significant pain with walking and having radiating pain down the back of her leg. She denies any weakness, numbness, tingling, changes in bowel or bladder habits or any other concerns. She denies any fever. She denies history of the same. Historian: Patient    REVIEW OF SYSTEMS     Review of Systems   Constitutional: Negative. HENT: Negative. Respiratory: Negative. Cardiovascular: Negative. Gastrointestinal: Negative. Genitourinary: Negative. Musculoskeletal: Positive for arthralgias, back pain and gait problem. Skin: Negative. Hematological: Negative. Psychiatric/Behavioral: Negative. All other systems reviewed and are negative.       PAST MEDICAL HISTORY     Past Medical History:   Diagnosis Date    Bicuspid aortic valve     CAD (coronary artery disease)     Chronic pain 3/14/2016    Cigarette smoker 11/22/2016    Hypertension     Insomnia 3/14/2016    Obesity (BMI 30-39.9) 3/14/2016    Psychiatric disorder     anxiety , ADHD    Unspecified adverse effect of anesthesia     high anxiety after surgery, hiper     Past Surgical History:   Procedure Laterality Date    HX HYSTERECTOMY       Social History     Socioeconomic History    Marital status:      Spouse name: Not on file    Number of children: Not on file    Years of education: Not on file    Highest education level: Not on file   Tobacco Use    Smoking status: Current Every Day Smoker     Packs/day: 0.50     Last attempt to quit: 2018     Years since quittin.8    Smokeless tobacco: Never Used    Tobacco comment: 30 yrPPD   Substance and Sexual Activity    Alcohol use: No     Alcohol/week: 0.0 standard drinks    Drug use: No   Social History Narrative    , not working. 2 children, 5 grandchildren     Prior to Admission Medications   Prescriptions Last Dose Informant Patient Reported? Taking? albuterol (PROVENTIL HFA, VENTOLIN HFA, PROAIR HFA) 90 mcg/actuation inhaler   No No   Sig: Take 1 Puff by inhalation every six (6) hours as needed for Wheezing or Shortness of Breath (DO NOT TAKE WITH amitriptyline/elavil). amitriptyline (ELAVIL) 100 mg tablet   No No   Sig: Take 1 Tab by mouth nightly. lisinopriL (PRINIVIL, ZESTRIL) 40 mg tablet   No No   Sig: Take 1 Tab by mouth daily. mirtazapine (REMERON) 30 mg tablet   No No   Sig: Take 1 Tab by mouth nightly. ondansetron (ZOFRAN ODT) 4 mg disintegrating tablet   No No   Sig: Take 1 Tab by mouth daily as needed for Nausea. Use sparingly   pantoprazole (PROTONIX) 40 mg tablet   No No   Sig: Take 1 Tab by mouth daily. topiramate (TOPAMAX) 25 mg tablet   No No   Sig: Take 1 tab PO QHS for first week, then increase to 1 tab PO BID thereafter      Facility-Administered Medications: None     Allergies   Allergen Reactions    Lortab [Hydrocodone-Acetaminophen] Nausea and Vomiting     Patient now states not allergic. PHYSICAL EXAM       Vitals:    20 1041   BP: (!) 166/82   Pulse: (!) 105   Resp: 16   Temp: 98.4 °F (36.9 °C)   SpO2: 95%    Vital signs were reviewed. Physical Exam  Vitals signs and nursing note reviewed. Constitutional:       General: She is in acute distress (Obvious discomfort). Appearance: Normal appearance. HENT:      Head: Atraumatic. Mouth/Throat:      Mouth: Mucous membranes are moist.   Eyes:      Extraocular Movements: Extraocular movements intact. Pulmonary:      Effort: Pulmonary effort is normal.   Abdominal:      General: Abdomen is flat. Palpations: Abdomen is soft. Musculoskeletal: Normal range of motion. General: No swelling, deformity or signs of injury. Comments: Tenderness and spasm of the right SI joint and piriformis muscle. There is no midline tenderness to palpation. No saddle anesthesia or lower extremity weakness. Patient had right wrist pain and swelling over the weekend but this has since resolved. There is no warmth or erythema of the right wrist.   Skin:     General: Skin is warm and dry. Neurological:      General: No focal deficit present. Mental Status: She is alert. She is disoriented. Psychiatric:         Mood and Affect: Mood normal.         Behavior: Behavior normal.         Thought Content: Thought content normal.         Judgment: Judgment normal.          MEDICAL DECISION MAKING     ED Course:    No results found for this or any previous visit (from the past 8 hour(s)). No results found. MDM  Number of Diagnoses or Management Options  Diagnosis management comments: 49-year-old female with pain of the right SI joint with sciatic nerve radiation. Patient significantly tender over the right piriformis muscle. Patient with no evidence of cauda equina. Patient given anti-inflammatories, muscle relaxer steroids and Lidoderm patches. She will return emergency department for any concerns. Disposition:  Discharged  Diagnosis:     ICD-10-CM ICD-9-CM   1. Inflammation of right sacroiliac joint (HCC)  M46.1 720.2   2. Piriformis syndrome of right side  G57.01 355.0   3. Lumbar radiculopathy  M54.16 724.4     ____________________________________________________________________  A portion of this note was generated using voice recognition dictation software.  While the note has been reviewed for accuracy, please note certain words and phrases may not be transcribed as intended and some grammatical and/or typographical errors may be present.

## 2020-09-14 NOTE — LETTER
NOTIFICATION RETURN TO WORK / SCHOOL 
 
9/14/2020 11:00 AM 
 
Ms. Maria Teresa Brambila 
[facility] [address] [facility] [address] To Whom It May Concern: 
 
[address] [place] [address] is currently under the care of [facility] EMERGENCY DEPT. She will return to work/school on: 9/16/20 If there are questions or concerns please have the patient contact our office. Sincerely, Kylah Senior, DO

## 2020-09-14 NOTE — ED TRIAGE NOTES
Pt ambulatory to triage. Right wrist started hurting and swelling this weekend along with pain starting in her right hip going down her right leg. Reports throbbing. Pt denies known injury. Pt states she cant lay down or bend down without getting uncomfortable.

## 2020-09-23 ENCOUNTER — HOSPITAL ENCOUNTER (EMERGENCY)
Age: 45
Discharge: HOME | End: 2020-09-23
Attending: EMERGENCY MEDICINE
Payer: MEDICARE

## 2020-09-23 VITALS
BODY MASS INDEX: 27.97 KG/M2 | SYSTOLIC BLOOD PRESSURE: 170 MMHG | OXYGEN SATURATION: 98 % | DIASTOLIC BLOOD PRESSURE: 110 MMHG | RESPIRATION RATE: 16 BRPM | TEMPERATURE: 98.9 F | HEART RATE: 91 BPM | HEIGHT: 66 IN | WEIGHT: 174 LBS

## 2020-09-23 DIAGNOSIS — S60.212A TRAUMATIC HEMATOMA OF LEFT WRIST, INITIAL ENCOUNTER: Primary | ICD-10-CM

## 2020-09-23 PROCEDURE — 99284 EMERGENCY DEPT VISIT MOD MDM: CPT

## 2020-09-23 NOTE — Clinical Note
[address] [facility] EMERGENCY DEPT 
[address] [facility] [address] 
[phone] Work/School Note Date: 9/23/2020 To Whom It May concern: 
 
 
Samantha Palma was seen and treated today in the emergency room by the following provider(s): 
Attending Provider: Melissa King MD.   
 
Samantha Palma is excused from work/school on 09/23/20. She is clear to return to work/school on 09/24/20. Sincerely, Alvarez Grover MD

## 2020-09-23 NOTE — ED NOTES
I have reviewed discharge instructions with the patient and spouse. The patient and spouse verbalized understanding. Patient left ED via Discharge Method: ambulatory to Home with spouse    Opportunity for questions and clarification provided. Patient given 0 scripts. To continue your aftercare when you leave the hospital, you may receive an automated call from our care team to check in on how you are doing. This is a free service and part of our promise to provide the best care and service to meet your aftercare needs.  If you have questions, or wish to unsubscribe from this service please call [phone]. Thank you for Choosing our [employer] Emergency Department.

## 2020-09-23 NOTE — ED PROVIDER NOTES
33-year-old female presenting for a large bruise on her left wrist.  States she was lifting a box off the top shelf when it slipped out of her hand and ran down the medial aspect of her left forearm. She has full range of motion. The area was just getting more swollen and bruised. She is taken nothing for it. Denies any other injuries. The history is provided by the patient. Arm Injury    This is a new problem. The current episode started 1 to 2 hours ago. The problem occurs constantly. The problem has not changed since onset. The pain is present in the left arm. The quality of the pain is described as aching. The pain is at a severity of 2/10. The pain is mild. Pertinent negatives include no numbness, full range of motion, no stiffness, no tingling, no itching, no back pain and no neck pain. She has tried nothing for the symptoms. The treatment provided no relief.         Past Medical History:   Diagnosis Date    Bicuspid aortic valve     CAD (coronary artery disease)     Chronic pain 3/14/2016    Cigarette smoker 11/22/2016    Hypertension     Insomnia 3/14/2016    Obesity (BMI 30-39.9) 3/14/2016    Psychiatric disorder     anxiety , ADHD    Unspecified adverse effect of anesthesia     high anxiety after surgery, hiper       Past Surgical History:   Procedure Laterality Date    HX HYSTERECTOMY           Family History:   Problem Relation Age of Onset    Other Mother         bipolar disorder    COPD Mother     Diabetes Father     Hypertension Father     Diabetes Sister     Breast Cancer Neg Hx        Social History     Socioeconomic History    Marital status:      Spouse name: Not on file    Number of children: Not on file    Years of education: Not on file    Highest education level: Not on file   Occupational History    Not on file   Social Needs    Financial resource strain: Not on file    Food insecurity     Worry: Not on file     Inability: Not on file   [employer] needs     Medical: Not on file     Non-medical: Not on file   Tobacco Use    Smoking status: Current Every Day Smoker     Packs/day: 0.50     Last attempt to quit: 2018     Years since quittin.8    Smokeless tobacco: Never Used    Tobacco comment: 30 yrPPD   Substance and Sexual Activity    Alcohol use: No     Alcohol/week: 0.0 standard drinks    Drug use: No    Sexual activity: Not on file   Lifestyle    Physical activity     Days per week: Not on file     Minutes per session: Not on file    Stress: Not on file   Relationships    Social connections     Talks on phone: Not on file     Gets together: Not on file     Attends Nondenominational service: Not on file     Active member of club or organization: Not on file     Attends meetings of clubs or organizations: Not on file     Relationship status: Not on file    Intimate partner violence     Fear of current or ex partner: Not on file     Emotionally abused: Not on file     Physically abused: Not on file     Forced sexual activity: Not on file   Other Topics Concern    Not on file   Social History Narrative    , not working. 2 children, 5 grandchildren         ALLERGIES: Patient has no known allergies. Review of Systems   Musculoskeletal: Negative for back pain, neck pain and stiffness. Skin: Positive for wound. Negative for itching. Neurological: Negative for tingling and numbness. Hematological: Bruises/bleeds easily. All other systems reviewed and are negative. Vitals:    20 1503   BP: (!) 169/87   Pulse: (!) 106   Resp: 16   Temp: 98.9 °F (37.2 °C)   SpO2: 98%   Weight: 78.9 kg (174 lb)   Height: 5' 6\" (1.676 m)            Physical Exam  Vitals signs and nursing note reviewed. Constitutional:       Appearance: She is well-developed. HENT:      Head: Normocephalic and atraumatic. Eyes:      Conjunctiva/sclera: Conjunctivae normal.      Pupils: Pupils are equal, round, and reactive to light.    Neck: Musculoskeletal: Neck supple. Cardiovascular:      Rate and Rhythm: Normal rate and regular rhythm. Pulmonary:      Effort: Pulmonary effort is normal.      Breath sounds: Normal breath sounds. Abdominal:      General: Bowel sounds are normal.      Palpations: Abdomen is soft. Musculoskeletal: Normal range of motion. General: Tenderness present. Comments: Small abrasion, moderate ecchymosis and swelling over the medial aspect of the distal ulna of the left forearm. Range of motion of the wrist is intact,  is intact, no tenderness over the snuffbox, supination and pronation are normal.   Skin:     General: Skin is warm and dry. Neurological:      Mental Status: She is alert and oriented to person, place, and time. MDM  Number of Diagnoses or Management Options  Diagnosis management comments: 77-year-old female presenting for evaluation of a developing hematoma near the left wrist.  Patient has full range of motion no point tenderness. No indication for x-rays. Recommending anti-inflammatory medications icing and compression.     Risk of Complications, Morbidity, and/or Mortality  Presenting problems: low  Diagnostic procedures: low  Management options: low    Patient Progress  Patient progress: improved         Procedures

## 2020-09-23 NOTE — DISCHARGE INSTRUCTIONS
You can ice the area, Tylenol and Motrin for pain, compression dressing such as an Ace wrap as appropriate as well. Should resolve over the next week.

## 2020-10-02 ENCOUNTER — HOSPITAL ENCOUNTER (OUTPATIENT)
Dept: CT IMAGING | Age: 45
Discharge: HOME | End: 2020-10-02
Attending: PHYSICIAN ASSISTANT
Payer: MEDICARE

## 2020-10-02 DIAGNOSIS — I71.20 THORACIC AORTIC ANEURYSM WITHOUT RUPTURE: ICD-10-CM

## 2020-10-02 LAB — CREAT BLD-MCNC: 0.8 MG/DL (ref 0.8–1.5)

## 2020-10-02 PROCEDURE — 71275 CT ANGIOGRAPHY CHEST: CPT

## 2020-10-02 PROCEDURE — 74011000258 HC RX REV CODE- 258: Performed by: PHYSICIAN ASSISTANT

## 2020-10-02 PROCEDURE — 82565 ASSAY OF CREATININE: CPT

## 2020-10-02 PROCEDURE — 74011000636 HC RX REV CODE- 636: Performed by: PHYSICIAN ASSISTANT

## 2020-10-02 RX ORDER — SODIUM CHLORIDE 0.9 % (FLUSH) 0.9 %
10 SYRINGE (ML) INJECTION
Status: COMPLETED | OUTPATIENT
Start: 2020-10-02 | End: 2020-10-02

## 2020-10-02 RX ADMIN — IOPAMIDOL 100 ML: 755 INJECTION, SOLUTION INTRAVENOUS at 12:03

## 2020-10-02 RX ADMIN — SODIUM CHLORIDE 100 ML: 900 INJECTION, SOLUTION INTRAVENOUS at 12:03

## 2020-10-02 RX ADMIN — Medication 10 ML: at 12:03

## 2021-04-12 ENCOUNTER — HOSPITAL ENCOUNTER (EMERGENCY)
Age: 46
Discharge: PSYCHIATRIC OR SUBSTANCE USE FACILITY | End: 2021-04-12
Attending: STUDENT IN AN ORGANIZED HEALTH CARE EDUCATION/TRAINING PROGRAM
Payer: MEDICARE

## 2021-04-12 VITALS
WEIGHT: 160 LBS | HEIGHT: 67 IN | HEART RATE: 74 BPM | SYSTOLIC BLOOD PRESSURE: 104 MMHG | RESPIRATION RATE: 17 BRPM | TEMPERATURE: 97.9 F | OXYGEN SATURATION: 97 % | DIASTOLIC BLOOD PRESSURE: 66 MMHG | BODY MASS INDEX: 25.11 KG/M2

## 2021-04-12 DIAGNOSIS — R45.851 SUICIDAL IDEATION: Primary | ICD-10-CM

## 2021-04-12 LAB
ALBUMIN SERPL-MCNC: 4.2 G/DL (ref 3.5–5)
ALBUMIN/GLOB SERPL: 1.4 {RATIO} (ref 1.2–3.5)
ALP SERPL-CCNC: 96 U/L (ref 50–136)
ALT SERPL-CCNC: 22 U/L (ref 12–65)
AMPHET UR QL SCN: NEGATIVE
ANION GAP SERPL CALC-SCNC: 4 MMOL/L (ref 7–16)
APAP SERPL-MCNC: <10 UG/ML (ref 10–30)
AST SERPL-CCNC: 16 U/L (ref 15–37)
BARBITURATES UR QL SCN: NEGATIVE
BASOPHILS # BLD: 0.1 K/UL (ref 0–0.2)
BASOPHILS NFR BLD: 1 % (ref 0–2)
BENZODIAZ UR QL: NEGATIVE
BILIRUB SERPL-MCNC: 0.5 MG/DL (ref 0.2–1.1)
BUN SERPL-MCNC: 17 MG/DL (ref 6–23)
CALCIUM SERPL-MCNC: 9.3 MG/DL (ref 8.3–10.4)
CANNABINOIDS UR QL SCN: POSITIVE
CHLORIDE SERPL-SCNC: 113 MMOL/L (ref 98–107)
CO2 SERPL-SCNC: 23 MMOL/L (ref 21–32)
COCAINE UR QL SCN: NEGATIVE
CREAT SERPL-MCNC: 0.88 MG/DL (ref 0.6–1)
DIFFERENTIAL METHOD BLD: ABNORMAL
EOSINOPHIL # BLD: 0.2 K/UL (ref 0–0.8)
EOSINOPHIL NFR BLD: 2 % (ref 0.5–7.8)
ERYTHROCYTE [DISTWIDTH] IN BLOOD BY AUTOMATED COUNT: 12.1 % (ref 11.9–14.6)
ETHANOL SERPL-MCNC: <3 MG/DL
GLOBULIN SER CALC-MCNC: 3.1 G/DL (ref 2.3–3.5)
GLUCOSE SERPL-MCNC: 104 MG/DL (ref 65–100)
HCT VFR BLD AUTO: 41.3 % (ref 35.8–46.3)
HGB BLD-MCNC: 14.5 G/DL (ref 11.7–15.4)
IMM GRANULOCYTES # BLD AUTO: 0.1 K/UL (ref 0–0.5)
IMM GRANULOCYTES NFR BLD AUTO: 1 % (ref 0–5)
LYMPHOCYTES # BLD: 2 K/UL (ref 0.5–4.6)
LYMPHOCYTES NFR BLD: 32 % (ref 13–44)
MAGNESIUM SERPL-MCNC: 2.3 MG/DL (ref 1.8–2.4)
MCH RBC QN AUTO: 34.3 PG (ref 26.1–32.9)
MCHC RBC AUTO-ENTMCNC: 35.1 G/DL (ref 31.4–35)
MCV RBC AUTO: 97.6 FL (ref 79.6–97.8)
METHADONE UR QL: NEGATIVE
MONOCYTES # BLD: 0.3 K/UL (ref 0.1–1.3)
MONOCYTES NFR BLD: 6 % (ref 4–12)
NEUTS SEG # BLD: 3.6 K/UL (ref 1.7–8.2)
NEUTS SEG NFR BLD: 59 % (ref 43–78)
NRBC # BLD: 0 K/UL (ref 0–0.2)
OPIATES UR QL: NEGATIVE
PCP UR QL: NEGATIVE
PLATELET # BLD AUTO: 131 K/UL (ref 150–450)
PMV BLD AUTO: 13 FL (ref 9.4–12.3)
POTASSIUM SERPL-SCNC: 3.7 MMOL/L (ref 3.5–5.1)
PROT SERPL-MCNC: 7.3 G/DL (ref 6.3–8.2)
RBC # BLD AUTO: 4.23 M/UL (ref 4.05–5.2)
SALICYLATES SERPL-MCNC: 7.5 MG/DL (ref 2.8–20)
SODIUM SERPL-SCNC: 140 MMOL/L (ref 136–145)
WBC # BLD AUTO: 6.2 K/UL (ref 4.3–11.1)

## 2021-04-12 PROCEDURE — 82077 ASSAY SPEC XCP UR&BREATH IA: CPT

## 2021-04-12 PROCEDURE — 81003 URINALYSIS AUTO W/O SCOPE: CPT

## 2021-04-12 PROCEDURE — 80053 COMPREHEN METABOLIC PANEL: CPT

## 2021-04-12 PROCEDURE — 99285 EMERGENCY DEPT VISIT HI MDM: CPT

## 2021-04-12 PROCEDURE — 83735 ASSAY OF MAGNESIUM: CPT

## 2021-04-12 PROCEDURE — 80307 DRUG TEST PRSMV CHEM ANLYZR: CPT

## 2021-04-12 PROCEDURE — 80179 DRUG ASSAY SALICYLATE: CPT

## 2021-04-12 PROCEDURE — 74011250637 HC RX REV CODE- 250/637: Performed by: STUDENT IN AN ORGANIZED HEALTH CARE EDUCATION/TRAINING PROGRAM

## 2021-04-12 PROCEDURE — 85025 COMPLETE CBC W/AUTO DIFF WBC: CPT

## 2021-04-12 PROCEDURE — 80143 DRUG ASSAY ACETAMINOPHEN: CPT

## 2021-04-12 RX ORDER — ONDANSETRON 4 MG/1
4 TABLET, ORALLY DISINTEGRATING ORAL
Status: DISCONTINUED | OUTPATIENT
Start: 2021-04-12 | End: 2021-04-13 | Stop reason: HOSPADM

## 2021-04-12 RX ORDER — LISINOPRIL 20 MG/1
40 TABLET ORAL DAILY
Status: DISCONTINUED | OUTPATIENT
Start: 2021-04-13 | End: 2021-04-13 | Stop reason: HOSPADM

## 2021-04-12 RX ORDER — IBUPROFEN 200 MG
1 TABLET ORAL EVERY 24 HOURS
Status: DISCONTINUED | OUTPATIENT
Start: 2021-04-12 | End: 2021-04-13 | Stop reason: HOSPADM

## 2021-04-12 RX ORDER — CLONAZEPAM 0.5 MG/1
1 TABLET ORAL
Status: DISCONTINUED | OUTPATIENT
Start: 2021-04-12 | End: 2021-04-13 | Stop reason: HOSPADM

## 2021-04-12 RX ORDER — GABAPENTIN 300 MG/1
600 CAPSULE ORAL 3 TIMES DAILY
Status: DISCONTINUED | OUTPATIENT
Start: 2021-04-12 | End: 2021-04-13 | Stop reason: HOSPADM

## 2021-04-12 NOTE — ED NOTES
Constant Observer Yes - Name: [facility]   High risk patients are in line of sight at all times Yes   Excess equipment/medical supplies not necessary for the care of the patient removed Yes   All sharp or dangerous objects are removed from room: including but not limited to belts, pens & pencils, needles, medications, cosmetics, lighters, matches, nail files, watches, necklaces, glass objects, razors, razor blades, knives, aerosol sprays, drawstring pants, shoes, cords (telephone, call bells, etc.) cleaning wipes or other cleaning items, aluminum cans, not permanently attached wall décor Yes   Telephone/cell phone removed as well as TV remote (batteries can be swallowed) Yes   Patient belongings removed and labeled at nurses station Yes   Excess linen is removed from room Yes   All plastic bags are removed from the room and replaced with paper trash bags Yes   Patient is in paper scrubs or appropriate gown and using hospital socks with rubber soles Yes   No metal, hard eating utensils or hard plates are on meal tray Yes   Remove all cleaning agents used by [employer] Yes   If Crucifix is hanging on a nail, remove Crucifix as well as the nail Yes       *If any question above is answered \"No,\" documentation is required.

## 2021-04-12 NOTE — ED PROVIDER NOTES
78-year-old female presents to the ER with increased depression and suicidal ideation. Today were patient was playing with a  and had a pointed to her left wrist.  Does endorse a history of suicide attempts in the past.  Patient's current plan would be to cut her artery in her arm with a . Patient reports the loss of her father which occurred 3 months ago has increased her suicidal ideation. She does report seeing a psychiatrist in the past although currently is in between psychiatrist.  Reports next appointment is not until next month. She denies recent fever, chills, chest pain, shortness breath, nausea or vomiting. Patient denies any medical complaints.            Past Medical History:   Diagnosis Date    Bicuspid aortic valve     CAD (coronary artery disease)     Chronic pain 3/14/2016    Cigarette smoker 11/22/2016    Hypertension     Insomnia 3/14/2016    Obesity (BMI 30-39.9) 3/14/2016    Psychiatric disorder     anxiety , ADHD    Unspecified adverse effect of anesthesia     high anxiety after surgery, hiper       Past Surgical History:   Procedure Laterality Date    HX HYSTERECTOMY           Family History:   Problem Relation Age of Onset    Other Mother         bipolar disorder    COPD Mother     Diabetes Father     Hypertension Father     Diabetes Sister     Breast Cancer Neg Hx        Social History     Socioeconomic History    Marital status:      Spouse name: Not on file    Number of children: Not on file    Years of education: Not on file    Highest education level: Not on file   Occupational History    Not on file   Social Needs    Financial resource strain: Not on file    Food insecurity     Worry: Not on file     Inability: Not on file    Transportation needs     Medical: Not on file     Non-medical: Not on file   Tobacco Use    Smoking status: Current Every Day Smoker     Packs/day: 0.50     Years: 15.00     Pack years: 7.50     Types: Cigarettes     Last attempt to quit: 2018     Years since quittin.3    Smokeless tobacco: Never Used    Tobacco comment: 30 yrPPD   Substance and Sexual Activity    Alcohol use: No     Alcohol/week: 0.0 standard drinks    Drug use: No    Sexual activity: Not on file   Lifestyle    Physical activity     Days per week: Not on file     Minutes per session: Not on file    Stress: Not on file   Relationships    Social connections     Talks on phone: Not on file     Gets together: Not on file     Attends Mandaeism service: Not on file     Active member of club or organization: Not on file     Attends meetings of clubs or organizations: Not on file     Relationship status: Not on file    Intimate partner violence     Fear of current or ex partner: Not on file     Emotionally abused: Not on file     Physically abused: Not on file     Forced sexual activity: Not on file   Other Topics Concern    Not on file   Social History Narrative    , not working. 2 children, 5 grandchildren         ALLERGIES: Patient has no known allergies. Review of Systems   Constitutional: Negative for chills and fever. HENT: Negative for sinus pressure and sore throat. Eyes: Negative for visual disturbance. Respiratory: Negative for cough and shortness of breath. Cardiovascular: Negative for chest pain. Gastrointestinal: Negative for abdominal pain, diarrhea, nausea and vomiting. Endocrine: Negative for polyuria. Genitourinary: Negative for difficulty urinating and dysuria. Musculoskeletal: Negative for neck pain and neck stiffness. Skin: Negative for rash. Neurological: Negative for speech difficulty, weakness, numbness and headaches. Psychiatric/Behavioral: Positive for suicidal ideas. Negative for confusion. All other systems reviewed and are negative.       Vitals:    21 1452   BP: (!) 149/88   Pulse: 93   Resp: 18   Temp: 97.5 °F (36.4 °C)   SpO2: 99%   Weight: 72.6 kg (160 lb) Height: 5' 7\" (1.702 m)            Physical Exam  Vitals signs and nursing note reviewed. Constitutional:       Appearance: Normal appearance. She is not ill-appearing or toxic-appearing. HENT:      Head: Normocephalic and atraumatic. Nose: Nose normal.      Mouth/Throat:      Mouth: Mucous membranes are moist.   Eyes:      Extraocular Movements: Extraocular movements intact. Neck:      Musculoskeletal: Normal range of motion. No neck rigidity. Cardiovascular:      Rate and Rhythm: Normal rate and regular rhythm. Pulses: Normal pulses. Heart sounds: Normal heart sounds. Pulmonary:      Effort: Pulmonary effort is normal. No respiratory distress. Breath sounds: Normal breath sounds. Abdominal:      General: Abdomen is flat. There is no distension. Palpations: Abdomen is soft. Tenderness: There is no abdominal tenderness. Musculoskeletal: Normal range of motion. Skin:     General: Skin is warm and dry. Neurological:      General: No focal deficit present. Mental Status: She is alert and oriented to person, place, and time. Psychiatric:      Comments: Tearful, suicidal ideation present          MDM  Number of Diagnoses or Management Options  Suicidal ideation  Diagnosis management comments: 53-year-old female presents with suicidal ideation. Basic labs be obtained. Will obtain consult from Tele-psychiatry. Labs show no emergent findings, normal white count, stable H&H, normal kidney function and electrolytes. Psychiatry agree with need for inpatient psychiatric evaluation and treatment. Patient placed on white papers. Patient's home medications ordered. While here in emergency department, patient did get accepted to local psychiatric facility. She will be transported via EMS. Patient is medically stable disposition to inpatient psychiatry.        Amount and/or Complexity of Data Reviewed  Clinical lab tests: ordered and reviewed  Discussion of test results with the performing providers: yes  Discuss the patient with other providers: yes    Risk of Complications, Morbidity, and/or Mortality  Presenting problems: high  Diagnostic procedures: moderate  Management options: moderate           Procedures

## 2021-04-12 NOTE — ED TRIAGE NOTES
Patient arrives with spouse, after she was found by manager at work with  at left wrist. Patient reports increased depression in last several weeks after death of her father. Patient reports previous suicide attempts, but denies any Homicidal ideations.  Masked

## 2021-04-13 NOTE — ED NOTES
Report called to Blade Sherwood at [facility] where patient is being transferred.

## 2021-08-03 ENCOUNTER — APPOINTMENT (OUTPATIENT)
Dept: GENERAL RADIOLOGY | Age: 46
End: 2021-08-03
Attending: PHYSICIAN ASSISTANT
Payer: MEDICARE

## 2021-08-03 ENCOUNTER — HOSPITAL ENCOUNTER (EMERGENCY)
Age: 46
Discharge: HOME | End: 2021-08-03
Attending: EMERGENCY MEDICINE
Payer: MEDICARE

## 2021-08-03 VITALS
WEIGHT: 160 LBS | TEMPERATURE: 98 F | DIASTOLIC BLOOD PRESSURE: 70 MMHG | OXYGEN SATURATION: 100 % | SYSTOLIC BLOOD PRESSURE: 150 MMHG | HEART RATE: 73 BPM | HEIGHT: 66 IN | RESPIRATION RATE: 20 BRPM | BODY MASS INDEX: 25.71 KG/M2

## 2021-08-03 DIAGNOSIS — R09.81 NASAL CONGESTION: ICD-10-CM

## 2021-08-03 DIAGNOSIS — R05.9 COUGH: ICD-10-CM

## 2021-08-03 DIAGNOSIS — R19.7 DIARRHEA, UNSPECIFIED TYPE: Primary | ICD-10-CM

## 2021-08-03 DIAGNOSIS — R11.2 NON-INTRACTABLE VOMITING WITH NAUSEA, UNSPECIFIED VOMITING TYPE: ICD-10-CM

## 2021-08-03 DIAGNOSIS — R06.2 WHEEZING: ICD-10-CM

## 2021-08-03 PROBLEM — R11.10 NON-INTRACTABLE VOMITING: Status: ACTIVE | Noted: 2021-08-03

## 2021-08-03 LAB
ALBUMIN SERPL-MCNC: 3.8 G/DL (ref 3.5–5)
ALBUMIN/GLOB SERPL: 1.2 {RATIO} (ref 1.2–3.5)
ALP SERPL-CCNC: 74 U/L (ref 50–136)
ALT SERPL-CCNC: 13 U/L (ref 12–65)
ANION GAP SERPL CALC-SCNC: 6 MMOL/L (ref 7–16)
AST SERPL-CCNC: 8 U/L (ref 15–37)
BASOPHILS # BLD: 0.1 K/UL (ref 0–0.2)
BASOPHILS NFR BLD: 1 % (ref 0–2)
BILIRUB SERPL-MCNC: 0.2 MG/DL (ref 0.2–1.1)
BUN SERPL-MCNC: 23 MG/DL (ref 6–23)
CALCIUM SERPL-MCNC: 8.8 MG/DL (ref 8.3–10.4)
CHLORIDE SERPL-SCNC: 114 MMOL/L (ref 98–107)
CO2 SERPL-SCNC: 24 MMOL/L (ref 21–32)
CREAT SERPL-MCNC: 0.81 MG/DL (ref 0.6–1)
DIFFERENTIAL METHOD BLD: ABNORMAL
EOSINOPHIL # BLD: 0 K/UL (ref 0–0.8)
EOSINOPHIL NFR BLD: 0 % (ref 0.5–7.8)
ERYTHROCYTE [DISTWIDTH] IN BLOOD BY AUTOMATED COUNT: 12.1 % (ref 11.9–14.6)
GLOBULIN SER CALC-MCNC: 3.1 G/DL (ref 2.3–3.5)
GLUCOSE SERPL-MCNC: 76 MG/DL (ref 65–100)
HCT VFR BLD AUTO: 44.9 % (ref 35.8–46.3)
HGB BLD-MCNC: 15.3 G/DL (ref 11.7–15.4)
IMM GRANULOCYTES # BLD AUTO: 0 K/UL (ref 0–0.5)
IMM GRANULOCYTES NFR BLD AUTO: 0 % (ref 0–5)
LIPASE SERPL-CCNC: 76 U/L (ref 73–393)
LYMPHOCYTES # BLD: 2.1 K/UL (ref 0.5–4.6)
LYMPHOCYTES NFR BLD: 31 % (ref 13–44)
MAGNESIUM SERPL-MCNC: 2.2 MG/DL (ref 1.8–2.4)
MCH RBC QN AUTO: 33.1 PG (ref 26.1–32.9)
MCHC RBC AUTO-ENTMCNC: 34.1 G/DL (ref 31.4–35)
MCV RBC AUTO: 97.2 FL (ref 79.6–97.8)
MONOCYTES # BLD: 0.4 K/UL (ref 0.1–1.3)
MONOCYTES NFR BLD: 5 % (ref 4–12)
NEUTS SEG # BLD: 4.2 K/UL (ref 1.7–8.2)
NEUTS SEG NFR BLD: 62 % (ref 43–78)
NRBC # BLD: 0 K/UL (ref 0–0.2)
PLATELET # BLD AUTO: 150 K/UL (ref 150–450)
PMV BLD AUTO: 12.6 FL (ref 9.4–12.3)
POTASSIUM SERPL-SCNC: 3.8 MMOL/L (ref 3.5–5.1)
PROT SERPL-MCNC: 6.9 G/DL (ref 6.3–8.2)
RBC # BLD AUTO: 4.62 M/UL (ref 4.05–5.2)
SARS-COV-2, COV2: NORMAL
SODIUM SERPL-SCNC: 144 MMOL/L (ref 136–145)
WBC # BLD AUTO: 6.7 K/UL (ref 4.3–11.1)

## 2021-08-03 PROCEDURE — U0005 INFEC AGEN DETEC AMPLI PROBE: HCPCS

## 2021-08-03 PROCEDURE — 80053 COMPREHEN METABOLIC PANEL: CPT

## 2021-08-03 PROCEDURE — 71046 X-RAY EXAM CHEST 2 VIEWS: CPT

## 2021-08-03 PROCEDURE — 74011250636 HC RX REV CODE- 250/636: Performed by: PHYSICIAN ASSISTANT

## 2021-08-03 PROCEDURE — 96374 THER/PROPH/DIAG INJ IV PUSH: CPT

## 2021-08-03 PROCEDURE — 81003 URINALYSIS AUTO W/O SCOPE: CPT

## 2021-08-03 PROCEDURE — 85025 COMPLETE CBC W/AUTO DIFF WBC: CPT

## 2021-08-03 PROCEDURE — 83690 ASSAY OF LIPASE: CPT

## 2021-08-03 PROCEDURE — 99284 EMERGENCY DEPT VISIT MOD MDM: CPT

## 2021-08-03 PROCEDURE — 83735 ASSAY OF MAGNESIUM: CPT

## 2021-08-03 RX ORDER — BENZONATATE 100 MG/1
100 CAPSULE ORAL
Qty: 30 CAPSULE | Refills: 0 | Status: SHIPPED | OUTPATIENT
Start: 2021-08-03 | End: 2021-08-10

## 2021-08-03 RX ORDER — ALBUTEROL SULFATE 90 UG/1
1 AEROSOL, METERED RESPIRATORY (INHALATION)
Qty: 1 INHALER | Refills: 0 | Status: SHIPPED | OUTPATIENT
Start: 2021-08-03 | End: 2021-08-17

## 2021-08-03 RX ORDER — ONDANSETRON 2 MG/ML
4 INJECTION INTRAMUSCULAR; INTRAVENOUS
Status: COMPLETED | OUTPATIENT
Start: 2021-08-03 | End: 2021-08-03

## 2021-08-03 RX ORDER — SODIUM CHLORIDE 0.9 % (FLUSH) 0.9 %
5-10 SYRINGE (ML) INJECTION EVERY 8 HOURS
Status: DISCONTINUED | OUTPATIENT
Start: 2021-08-03 | End: 2021-08-03 | Stop reason: HOSPADM

## 2021-08-03 RX ORDER — SODIUM CHLORIDE 0.9 % (FLUSH) 0.9 %
5-10 SYRINGE (ML) INJECTION AS NEEDED
Status: DISCONTINUED | OUTPATIENT
Start: 2021-08-03 | End: 2021-08-03 | Stop reason: HOSPADM

## 2021-08-03 RX ORDER — ONDANSETRON 4 MG/1
4 TABLET, ORALLY DISINTEGRATING ORAL
Qty: 12 TABLET | Refills: 0 | Status: SHIPPED | OUTPATIENT
Start: 2021-08-03 | End: 2021-08-07

## 2021-08-03 RX ORDER — INHALER, ASSIST DEVICES
1 SPACER (EA) MISCELLANEOUS AS NEEDED
Qty: 1 EACH | Refills: 0 | Status: SHIPPED | OUTPATIENT
Start: 2021-08-03

## 2021-08-03 RX ADMIN — ONDANSETRON 4 MG: 2 INJECTION INTRAMUSCULAR; INTRAVENOUS at 16:38

## 2021-08-03 NOTE — ED PROVIDER NOTES
24-year-old female comes in with concern for cough, congestion, nausea, vomiting and diarrhea for the past 3 days. Patient reports symptoms worsened yesterday and she as well as 9 of her coworkers were sent home from work due to their symptoms and concern for COVID-19. Patient reports that she had a rapid Covid swab which was negative but she did not trust the results because the swab was not accurately taken. She reports mild shortness of breath but denies any chest pain. She reports some mild wheezing as well and says that she feels she would benefit from an inhaler.            Past Medical History:   Diagnosis Date    Bicuspid aortic valve     CAD (coronary artery disease)     Chronic pain 3/14/2016    Cigarette smoker 2016    Hypertension     Insomnia 3/14/2016    Obesity (BMI 30-39.9) 3/14/2016    Psychiatric disorder     anxiety , ADHD    Unspecified adverse effect of anesthesia     high anxiety after surgery, hiper       Past Surgical History:   Procedure Laterality Date    HX HYSTERECTOMY           Family History:   Problem Relation Age of Onset    Other Mother         bipolar disorder    COPD Mother     Diabetes Father     Hypertension Father     Diabetes Sister     Breast Cancer Neg Hx        Social History     Socioeconomic History    Marital status:      Spouse name: Not on file    Number of children: Not on file    Years of education: Not on file    Highest education level: Not on file   Occupational History    Not on file   Tobacco Use    Smoking status: Current Every Day Smoker     Packs/day: 0.50     Years: 15.00     Pack years: 7.50     Types: Cigarettes     Last attempt to quit: 2018     Years since quittin.6    Smokeless tobacco: Never Used    Tobacco comment: 30 yrPPD   Substance and Sexual Activity    Alcohol use: No     Alcohol/week: 0.0 standard drinks    Drug use: No    Sexual activity: Not on file   Other Topics Concern    Not on file Social History Narrative    , not working. 2 children, 5 grandchildren     Social Determinants of Health     Financial Resource Strain:     Difficulty of Paying Living Expenses:    Food Insecurity:     Worried About Running Out of Food in the Last Year:     Ran Out of Food in the Last Year:    Transportation Needs:     Lack of Transportation (Medical):  Lack of Transportation (Non-Medical):    Physical Activity:     Days of Exercise per Week:     Minutes of Exercise per Session:    Stress:     Feeling of Stress :    Social Connections:     Frequency of Communication with Friends and Family:     Frequency of Social Gatherings with Friends and Family:     Attends Jehovah's witness Services:     Active Member of Clubs or Organizations:     Attends Club or Organization Meetings:     Marital Status:    Intimate Partner Violence:     Fear of Current or Ex-Partner:     Emotionally Abused:     Physically Abused:     Sexually Abused: ALLERGIES: Patient has no known allergies. Review of Systems   Constitutional: Negative for chills and fever. HENT: Positive for congestion, postnasal drip, rhinorrhea and sore throat. Negative for trouble swallowing. Respiratory: Positive for cough and wheezing. Negative for shortness of breath. Cardiovascular: Negative for chest pain. Gastrointestinal: Positive for diarrhea, nausea and vomiting. Negative for abdominal pain and blood in stool. Skin: Negative for color change. All other systems reviewed and are negative. Vitals:    08/03/21 1348 08/03/21 1522 08/03/21 1526 08/03/21 1757   BP: (!) 141/89 (!) 159/89  (!) 150/70   Pulse: (!) 101 85  73   Resp: 18   20   Temp: 98.7 °F (37.1 °C)   98 °F (36.7 °C)   SpO2: 99% 99% 100% 100%   Weight: 72.6 kg (160 lb)      Height: 5' 6\" (1.676 m)               Physical Exam  Vitals and nursing note reviewed. Constitutional:       General: She is not in acute distress.      Appearance: Normal appearance. She is not ill-appearing, toxic-appearing or diaphoretic. HENT:      Head: Normocephalic and atraumatic. Nose: Congestion and rhinorrhea present. Mouth/Throat:      Mouth: Mucous membranes are moist.      Pharynx: No oropharyngeal exudate or posterior oropharyngeal erythema. Eyes:      Conjunctiva/sclera: Conjunctivae normal.   Cardiovascular:      Rate and Rhythm: Normal rate and regular rhythm. Pulses: Normal pulses. Heart sounds: Normal heart sounds. No murmur heard. No friction rub. Pulmonary:      Effort: Pulmonary effort is normal. No respiratory distress. Breath sounds: Normal air entry. No stridor, decreased air movement or transmitted upper airway sounds. No decreased breath sounds, rhonchi or rales. Abdominal:      General: Abdomen is flat. Palpations: Abdomen is soft. Tenderness: There is no abdominal tenderness. There is no right CVA tenderness, left CVA tenderness or guarding. Musculoskeletal:      Cervical back: Normal range of motion and neck supple. Skin:     General: Skin is warm and dry. Neurological:      General: No focal deficit present. Mental Status: She is alert and oriented to person, place, and time. Mental status is at baseline. MDM  Number of Diagnoses or Management Options  Cough: new and requires workup  Diarrhea, unspecified type: new and requires workup  Nasal congestion: new and requires workup  Non-intractable vomiting with nausea, unspecified vomiting type: new and requires workup  Wheezing: new and requires workup  Diagnosis management comments: Labs without significant abnormality. Chest x-ray obtained and was negative for acute findings. Covid PCR swab was obtained as well. Patient was given some Zofran and will be prescribed medications for symptom relief including an albuterol inhaler. Patient was given work note and advised to return for emergent or severely worsening symptoms.        Amount and/or Complexity of Data Reviewed  Clinical lab tests: reviewed and ordered  Tests in the radiology section of CPT®: reviewed and ordered  Tests in the medicine section of CPT®: reviewed and ordered    Risk of Complications, Morbidity, and/or Mortality  Presenting problems: moderate  Diagnostic procedures: low  Management options: low    Patient Progress  Patient progress: stable         Procedures

## 2021-08-03 NOTE — ED NOTES
I have reviewed discharge instructions with the patient. The patient verbalized understanding. Patient left ED via Discharge Method: ambulatory to Home with (self). Opportunity for questions and clarification provided. Patient given 4 scripts. To continue your aftercare when you leave the hospital, you may receive an automated call from our care team to check in on how you are doing. This is a free service and part of our promise to provide the best care and service to meet your aftercare needs.  If you have questions, or wish to unsubscribe from this service please call [phone]. Thank you for Choosing our [address] [place] [address] Emergency Department.

## 2021-08-03 NOTE — Clinical Note
[address] [facility] EMERGENCY DEPT   [facility] [id number] [phone]    Work/School Note    Date: 8/3/2021     To Whom It May concern:    Radha Breath was evaulated by the following provider(s):  Attending Provider: Terry Lucero MD  Physician Assistant: Asia Powers, Maria Teresa [address] virus is suspected. Per the CDC guidelines we recommend home isolation until the following conditions are all met:    1. At least 10 days have passed since symptoms first appeared and  2. At least 24 hours have passed since last fever without the use of fever-reducing medications and  3.  Symptoms (e.g., cough, shortness of breath) have improved    Sincerely,          Alen Domingo

## 2021-08-03 NOTE — ED TRIAGE NOTES
Pt arrives via POV c/o tasting nickel, body aches and throat soreness. Pt reports diarrhea and vomiting since yesterday. Pt reports fevers/chills at home. Presents with even and unlabored respirations. Recently tested for covid and was negative. Pt masked.

## 2021-08-03 NOTE — DISCHARGE INSTRUCTIONS
You may take Tessalon Perles every 8 hours as needed for cough. Use the albuterol inhaler every 4-6 hours for wheezing and shortness of breath. Take Zofran every 6-8 hours for nausea/vomiting. Drink plenty of fluids, increase diet as tolerated and maintain bland diet while diarrhea persists including bananas rice applesauce and toast.  Return for emergent or severely worsening symptoms such as severe shortness of breath, chest pain or severe abdominal pain or any other emergent concerns.

## 2021-08-04 LAB
SARS-COV-2, COV2: NOT DETECTED
SPECIMEN SOURCE, FCOV2M: NORMAL

## 2021-08-13 ENCOUNTER — HOSPITAL ENCOUNTER (EMERGENCY)
Age: 46
Discharge: HOME | End: 2021-08-13
Attending: STUDENT IN AN ORGANIZED HEALTH CARE EDUCATION/TRAINING PROGRAM
Payer: MEDICARE

## 2021-08-13 VITALS — RESPIRATION RATE: 16 BRPM | OXYGEN SATURATION: 100 % | HEART RATE: 107 BPM | TEMPERATURE: 98.1 F

## 2021-08-13 DIAGNOSIS — R33.9 URINARY RETENTION: Primary | ICD-10-CM

## 2021-08-13 LAB
APPEARANCE UR: CLEAR
BILIRUB UR QL: NEGATIVE
COLOR UR: YELLOW
GLUCOSE UR STRIP.AUTO-MCNC: NEGATIVE MG/DL
HGB UR QL STRIP: NEGATIVE
KETONES UR QL STRIP.AUTO: NEGATIVE MG/DL
LEUKOCYTE ESTERASE UR QL STRIP.AUTO: NEGATIVE
NITRITE UR QL STRIP.AUTO: NEGATIVE
PH UR STRIP: 7 [PH] (ref 5–9)
PROT UR STRIP-MCNC: NEGATIVE MG/DL
SP GR UR REFRACTOMETRY: 1.01 (ref 1–1.02)
UROBILINOGEN UR QL STRIP.AUTO: 0.2 EU/DL (ref 0.2–1)

## 2021-08-13 PROCEDURE — 81003 URINALYSIS AUTO W/O SCOPE: CPT

## 2021-08-13 PROCEDURE — 99284 EMERGENCY DEPT VISIT MOD MDM: CPT

## 2021-08-13 PROCEDURE — 74011250637 HC RX REV CODE- 250/637: Performed by: NURSE PRACTITIONER

## 2021-08-13 RX ORDER — ONDANSETRON 8 MG/1
8 TABLET, ORALLY DISINTEGRATING ORAL
Status: COMPLETED | OUTPATIENT
Start: 2021-08-13 | End: 2021-08-13

## 2021-08-13 RX ORDER — PHENAZOPYRIDINE HYDROCHLORIDE 200 MG/1
200 TABLET, FILM COATED ORAL 3 TIMES DAILY
Qty: 6 TABLET | Refills: 0 | Status: SHIPPED | OUTPATIENT
Start: 2021-08-13 | End: 2021-08-15

## 2021-08-13 RX ORDER — PHENAZOPYRIDINE HYDROCHLORIDE 95 MG/1
200 TABLET ORAL
Status: COMPLETED | OUTPATIENT
Start: 2021-08-13 | End: 2021-08-13

## 2021-08-13 RX ADMIN — ONDANSETRON 8 MG: 8 TABLET, ORALLY DISINTEGRATING ORAL at 15:29

## 2021-08-13 RX ADMIN — URINARY PAIN RELIEF 190 MG: 95 TABLET ORAL at 15:29

## 2021-08-13 NOTE — ED PROVIDER NOTES
66-year-old female who presents to the emergency department today with complaint of bladder pressure. Patient states she feels like she has been unable to urinate since midnight but does have \"dribbles coming out. \"  Patient reports she has had plenty of water to drink today and is still not been able to urinate well. She denies any nausea, vomiting, diarrhea, vaginal discharge, cough, fever, or chills. The history is provided by the patient. Urinary Retention   This is a new problem. The current episode started 12 to 24 hours ago. The problem has been gradually worsening. The pain is located in the suprapubic region. Associated symptoms include dysuria. Pertinent negatives include no fever and no hematuria. Nothing worsens the pain. The pain is relieved by nothing.         Past Medical History:   Diagnosis Date    Bicuspid aortic valve     CAD (coronary artery disease)     Chronic pain 3/14/2016    Cigarette smoker 11/22/2016    Hypertension     Insomnia 3/14/2016    Obesity (BMI 30-39.9) 3/14/2016    Psychiatric disorder     anxiety , ADHD    Unspecified adverse effect of anesthesia     high anxiety after surgery, hiper       Past Surgical History:   Procedure Laterality Date    COLONOSCOPY N/A 4/28/2016    COLONOSCOPY  BMI 30 ROOM 603 performed by Jules Butler MD at [facility] ENDOSCOPY    HX HYSTERECTOMY           Family History:   Problem Relation Age of Onset    Other Mother         bipolar disorder    COPD Mother     Diabetes Father     Hypertension Father     Diabetes Sister     Breast Cancer Neg Hx        Social History     Socioeconomic History    Marital status:      Spouse name: Not on file    Number of children: Not on file    Years of education: Not on file    Highest education level: Not on file   Occupational History    Not on file   Tobacco Use    Smoking status: Current Every Day Smoker     Packs/day: 0.50     Years: 15.00     Pack years: 7.50     Types: Cigarettes Last attempt to quit: 2018     Years since quittin.7    Smokeless tobacco: Never Used    Tobacco comment: 30 yrPPD   Substance and Sexual Activity    Alcohol use: No     Alcohol/week: 0.0 standard drinks    Drug use: No    Sexual activity: Not on file   Other Topics Concern    Not on file   Social History Narrative    , not working. 2 children, 5 grandchildren     Social Determinants of Health     Financial Resource Strain:     Difficulty of Paying Living Expenses:    Food Insecurity:     Worried About Running Out of Food in the Last Year:     Ran Out of Food in the Last Year:    Transportation Needs:     Lack of Transportation (Medical):  Lack of Transportation (Non-Medical):    Physical Activity:     Days of Exercise per Week:     Minutes of Exercise per Session:    Stress:     Feeling of Stress :    Social Connections:     Frequency of Communication with Friends and Family:     Frequency of Social Gatherings with Friends and Family:     Attends Baptism Services:     Active Member of Clubs or Organizations:     Attends Club or Organization Meetings:     Marital Status:    Intimate Partner Violence:     Fear of Current or Ex-Partner:     Emotionally Abused:     Physically Abused:     Sexually Abused: ALLERGIES: Patient has no known allergies. Review of Systems   Constitutional: Negative for fever. Gastrointestinal: Negative for abdominal pain. Genitourinary: Positive for difficulty urinating and dysuria. Negative for flank pain, hematuria, vaginal bleeding, vaginal discharge and vaginal pain. All other systems reviewed and are negative. Vitals:    21 1429   Pulse: (!) 107   Resp: 16   Temp: 98.1 °F (36.7 °C)   SpO2: 100%            Physical Exam  Vitals and nursing note reviewed. Constitutional:       General: She is not in acute distress. Appearance: Normal appearance. She is normal weight.  She is not ill-appearing, toxic-appearing or diaphoretic. HENT:      Head: Normocephalic and atraumatic. Right Ear: External ear normal.      Left Ear: External ear normal.      Mouth/Throat:      Mouth: Mucous membranes are moist.      Pharynx: Oropharynx is clear. Eyes:      General: No scleral icterus. Extraocular Movements: Extraocular movements intact. Conjunctiva/sclera: Conjunctivae normal.   Cardiovascular:      Rate and Rhythm: Normal rate. Pulses: Normal pulses. Heart sounds: Normal heart sounds. Pulmonary:      Effort: Pulmonary effort is normal. No respiratory distress. Breath sounds: Normal breath sounds. Abdominal:      General: Abdomen is flat. Bowel sounds are normal. There is no distension. Palpations: Abdomen is soft. Tenderness: There is abdominal tenderness in the suprapubic area. Musculoskeletal:         General: Normal range of motion. Cervical back: Normal range of motion and neck supple. No rigidity. Right lower leg: No edema. Left lower leg: No edema. Skin:     General: Skin is warm and dry. Capillary Refill: Capillary refill takes less than 2 seconds. Neurological:      General: No focal deficit present. Mental Status: She is alert and oriented to person, place, and time. Psychiatric:         Mood and Affect: Mood normal.         Behavior: Behavior normal.         Thought Content: Thought content normal.         Judgment: Judgment normal.          MDM  Number of Diagnoses or Management Options  Urinary retention  Diagnosis management comments: 78-year-old female who reports to the emergency department today for complaint of urinary retention. Patient initially stated she did not want a catheter and that if we could just drain her bladder she thought she would feel better. Primary nurse did a straight catheter with 900 mL of clear, yellow urine expelled from bladder. Patient reports significant improvement in symptoms.   Patient does report a history of urinary retention in the past and has had an indwelling catheter. I have discussed with patient the risk that she would go home and not be able to urinate. After further discussion with the patient she is agreeable to have catheter placed. Moody catheter placed by primary nurse. Attached to leg bag. Patient to follow-up with urology. Patient denies any back pain, bowel incontinence, leg weakness, or numbness. No evidence of cauda equina syndrome. Patient does report a history of sciatica on the right side but denies having any sciatica symptoms at this time. Patient is ambulatory with normal, steady gait. I have discussed the results of all labs, procedures, radiographs, and/or treatments with the patient and available family members. Brenna Whitesburg is agreed upon by the patient and the patient is ready for discharge.  Questions about treatment in the ED and differential diagnosis of presenting condition were answered. Shama Casanova was given verbal discharge instructions including, but not limited to, importance of returning to the emergency department for any concern of worsening or continued symptoms.  Instructions were given to follow up with a primary care provider or specialist within 1-2 days. Denece Alon effects of medications, if prescribed, were discussed and patient was advised to refrain from significant physical activity until followed up by primary care physician and to not drive or operate heavy machinery after taking any sedating substances.      Kirk Lennox, NP; 8/13/2021 @3:52 PM Voice dictation software was used during the making of  this note. This software is not perfect and grammatical and other typographical errors  may be present. This note has not been proofread for errors.          Amount and/or Complexity of Data Reviewed  Clinical lab tests: reviewed    Risk of Complications, Morbidity, and/or Mortality  Presenting problems: moderate  Diagnostic procedures: moderate  Management options: low    Patient Progress  Patient progress: improved    ED Course as of Aug 13 1549   Fri Aug 13, 2021   1455 Patient states she doesn't think she needs a marin catheter, she just needs her bladder drained. [BC]   1510 Primary RN did straight cath with 900 mL clear, yellow urine. I have discussed this with the patient and she has decided to allow us to place a marin today.     [BC]      ED Course User Index  [BC] Ezequiel Ken NP     Recent Results (from the past 8 hour(s))   URINALYSIS W/ RFLX MICROSCOPIC    Collection Time: 08/13/21  3:02 PM   Result Value Ref Range    Color YELLOW      Appearance CLEAR      Specific gravity 1.010 1.001 - 1.023      pH (UA) 7.0 5.0 - 9.0      Protein Negative NEG mg/dL    Glucose Negative mg/dL    Ketone Negative NEG mg/dL    Bilirubin Negative NEG      Blood Negative NEG      Urobilinogen 0.2 0.2 - 1.0 EU/dL    Nitrites Negative NEG      Leukocyte Esterase Negative NEG         Procedures

## 2021-08-13 NOTE — ED TRIAGE NOTES
Patient arrives to ED pov from home. Patient is unable to urinate. Patient states has not peed since midnight. Patient reports this has happened to her before and had to get a catheter.

## 2021-08-13 NOTE — DISCHARGE INSTRUCTIONS
Take Pyridium as prescribed if needed for urinary pain or bladder spasm. Call Urology to schedule follow-up. Return to the ER for any new, worsening, or concerning symptoms.

## 2021-08-13 NOTE — LETTER
[address] [facility] EMERGENCY DEPT   [facility] [id number] [phone]    Work/School Note    Date: 8/13/2021    To Whom It May concern:    Sharita Hackett was seen and treated today in the emergency room by the following provider(s):  Attending Provider: Eladio Booker DO. Sharita Hackett may return to work on 08/14/2021, but please allow her to limit activity as she tolerates due to indwelling marin catheter.     Sincerely,          Joanie Hurley NP

## 2021-08-20 PROBLEM — E11.51 TYPE 2 DIABETES MELLITUS WITH PERIPHERAL VASCULAR DISEASE (HCC): Status: RESOLVED | Noted: 2019-11-04 | Resolved: 2021-08-20

## 2021-08-20 PROBLEM — E78.00 ELEVATED LDL CHOLESTEROL LEVEL: Status: ACTIVE | Noted: 2021-08-20

## 2021-08-20 PROBLEM — Z79.4 CONTROLLED TYPE 2 DIABETES MELLITUS WITHOUT COMPLICATION, WITH LONG-TERM CURRENT USE OF INSULIN (HCC): Status: RESOLVED | Noted: 2018-11-12 | Resolved: 2021-08-20

## 2021-08-20 PROBLEM — E78.00 HYPERCHOLESTEROLEMIA: Status: RESOLVED | Noted: 2018-11-12 | Resolved: 2021-08-20

## 2021-08-20 PROBLEM — E11.9 CONTROLLED TYPE 2 DIABETES MELLITUS WITHOUT COMPLICATION, WITH LONG-TERM CURRENT USE OF INSULIN (HCC): Status: RESOLVED | Noted: 2018-11-12 | Resolved: 2021-08-20

## 2021-09-29 ENCOUNTER — HOSPITAL ENCOUNTER (EMERGENCY)
Age: 46
Discharge: LEFT BEFORE DISCHARGE | End: 2021-09-29
Attending: EMERGENCY MEDICINE
Payer: MEDICARE

## 2021-09-29 DIAGNOSIS — Z53.21 PATIENT LEFT BEFORE EVALUATION BY PHYSICIAN: Primary | ICD-10-CM

## 2021-09-29 PROCEDURE — 75810000275 HC EMERGENCY DEPT VISIT NO LEVEL OF CARE

## 2021-10-01 PROBLEM — F51.04 INSOMNIA, PSYCHOPHYSIOLOGICAL: Status: ACTIVE | Noted: 2021-10-01

## 2021-10-01 PROBLEM — F41.1 GAD (GENERALIZED ANXIETY DISORDER): Status: ACTIVE | Noted: 2021-10-01

## 2021-10-01 PROBLEM — F31.75 BIPOLAR 1 DISORDER, DEPRESSED, PARTIAL REMISSION (HCC): Status: ACTIVE | Noted: 2021-10-01

## 2021-10-06 ENCOUNTER — HOSPITAL ENCOUNTER (EMERGENCY)
Age: 46
Discharge: HOME | End: 2021-10-06
Attending: EMERGENCY MEDICINE
Payer: MEDICARE

## 2021-10-06 ENCOUNTER — HOSPITAL ENCOUNTER (EMERGENCY)
Dept: CT IMAGING | Age: 46
Discharge: HOME | End: 2021-10-06
Attending: EMERGENCY MEDICINE
Payer: MEDICARE

## 2021-10-06 ENCOUNTER — APPOINTMENT (OUTPATIENT)
Dept: GENERAL RADIOLOGY | Age: 46
End: 2021-10-06
Attending: EMERGENCY MEDICINE
Payer: MEDICARE

## 2021-10-06 VITALS
RESPIRATION RATE: 18 BRPM | DIASTOLIC BLOOD PRESSURE: 71 MMHG | WEIGHT: 160 LBS | OXYGEN SATURATION: 100 % | BODY MASS INDEX: 25.71 KG/M2 | HEART RATE: 70 BPM | SYSTOLIC BLOOD PRESSURE: 168 MMHG | TEMPERATURE: 98 F | HEIGHT: 66 IN

## 2021-10-06 DIAGNOSIS — R07.9 ACUTE CHEST PAIN: Primary | ICD-10-CM

## 2021-10-06 DIAGNOSIS — R09.1 PLEURISY: ICD-10-CM

## 2021-10-06 LAB
ALBUMIN SERPL-MCNC: 3.2 G/DL (ref 3.5–5)
ALBUMIN/GLOB SERPL: 0.9 {RATIO} (ref 1.2–3.5)
ALP SERPL-CCNC: 82 U/L (ref 50–136)
ALT SERPL-CCNC: 14 U/L (ref 12–65)
ANION GAP SERPL CALC-SCNC: 9 MMOL/L (ref 7–16)
AST SERPL-CCNC: 20 U/L (ref 15–37)
ATRIAL RATE: 82 BPM
BASOPHILS # BLD: 0.1 K/UL (ref 0–0.2)
BASOPHILS NFR BLD: 1 % (ref 0–2)
BILIRUB SERPL-MCNC: 0.3 MG/DL (ref 0.2–1.1)
BUN SERPL-MCNC: 18 MG/DL (ref 6–23)
CALCIUM SERPL-MCNC: 8.8 MG/DL (ref 8.3–10.4)
CALCULATED P AXIS, ECG09: 64 DEGREES
CALCULATED R AXIS, ECG10: 49 DEGREES
CALCULATED T AXIS, ECG11: 69 DEGREES
CHLORIDE SERPL-SCNC: 114 MMOL/L (ref 98–107)
CO2 SERPL-SCNC: 20 MMOL/L (ref 21–32)
CREAT SERPL-MCNC: 1.01 MG/DL (ref 0.6–1)
DIAGNOSIS, 93000: NORMAL
DIFFERENTIAL METHOD BLD: ABNORMAL
EOSINOPHIL # BLD: 0.1 K/UL (ref 0–0.8)
EOSINOPHIL NFR BLD: 1 % (ref 0.5–7.8)
ERYTHROCYTE [DISTWIDTH] IN BLOOD BY AUTOMATED COUNT: 12.6 % (ref 11.9–14.6)
GLOBULIN SER CALC-MCNC: 3.6 G/DL (ref 2.3–3.5)
GLUCOSE SERPL-MCNC: 92 MG/DL (ref 65–100)
HCT VFR BLD AUTO: 40.6 % (ref 35.8–46.3)
HGB BLD-MCNC: 13.6 G/DL (ref 11.7–15.4)
IMM GRANULOCYTES # BLD AUTO: 0 K/UL (ref 0–0.5)
IMM GRANULOCYTES NFR BLD AUTO: 0 % (ref 0–5)
LIPASE SERPL-CCNC: 135 U/L (ref 73–393)
LYMPHOCYTES # BLD: 1.6 K/UL (ref 0.5–4.6)
LYMPHOCYTES NFR BLD: 28 % (ref 13–44)
MAGNESIUM SERPL-MCNC: 2.3 MG/DL (ref 1.8–2.4)
MCH RBC QN AUTO: 32.9 PG (ref 26.1–32.9)
MCHC RBC AUTO-ENTMCNC: 33.5 G/DL (ref 31.4–35)
MCV RBC AUTO: 98.3 FL (ref 79.6–97.8)
MONOCYTES # BLD: 0.4 K/UL (ref 0.1–1.3)
MONOCYTES NFR BLD: 6 % (ref 4–12)
NEUTS SEG # BLD: 3.6 K/UL (ref 1.7–8.2)
NEUTS SEG NFR BLD: 63 % (ref 43–78)
NRBC # BLD: 0 K/UL (ref 0–0.2)
P-R INTERVAL, ECG05: 144 MS
PLATELET # BLD AUTO: 156 K/UL (ref 150–450)
PMV BLD AUTO: 12.5 FL (ref 9.4–12.3)
POTASSIUM SERPL-SCNC: 3.7 MMOL/L (ref 3.5–5.1)
PROT SERPL-MCNC: 6.8 G/DL (ref 6.3–8.2)
Q-T INTERVAL, ECG07: 350 MS
QRS DURATION, ECG06: 74 MS
QTC CALCULATION (BEZET), ECG08: 408 MS
RBC # BLD AUTO: 4.13 M/UL (ref 4.05–5.2)
SODIUM SERPL-SCNC: 143 MMOL/L (ref 136–145)
TROPONIN-HIGH SENSITIVITY: 10.5 PG/ML (ref 0–14)
TROPONIN-HIGH SENSITIVITY: 8.4 PG/ML (ref 0–14)
VENTRICULAR RATE, ECG03: 82 BPM
WBC # BLD AUTO: 5.6 K/UL (ref 4.3–11.1)

## 2021-10-06 PROCEDURE — 70450 CT HEAD/BRAIN W/O DYE: CPT

## 2021-10-06 PROCEDURE — 74011000636 HC RX REV CODE- 636: Performed by: EMERGENCY MEDICINE

## 2021-10-06 PROCEDURE — 74011000258 HC RX REV CODE- 258: Performed by: EMERGENCY MEDICINE

## 2021-10-06 PROCEDURE — 80053 COMPREHEN METABOLIC PANEL: CPT

## 2021-10-06 PROCEDURE — 83690 ASSAY OF LIPASE: CPT

## 2021-10-06 PROCEDURE — 71275 CT ANGIOGRAPHY CHEST: CPT

## 2021-10-06 PROCEDURE — 84484 ASSAY OF TROPONIN QUANT: CPT

## 2021-10-06 PROCEDURE — 96375 TX/PRO/DX INJ NEW DRUG ADDON: CPT

## 2021-10-06 PROCEDURE — 70498 CT ANGIOGRAPHY NECK: CPT

## 2021-10-06 PROCEDURE — 83735 ASSAY OF MAGNESIUM: CPT

## 2021-10-06 PROCEDURE — 99284 EMERGENCY DEPT VISIT MOD MDM: CPT

## 2021-10-06 PROCEDURE — 71046 X-RAY EXAM CHEST 2 VIEWS: CPT

## 2021-10-06 PROCEDURE — 74011250636 HC RX REV CODE- 250/636: Performed by: EMERGENCY MEDICINE

## 2021-10-06 PROCEDURE — 85025 COMPLETE CBC W/AUTO DIFF WBC: CPT

## 2021-10-06 PROCEDURE — 96374 THER/PROPH/DIAG INJ IV PUSH: CPT

## 2021-10-06 PROCEDURE — 93005 ELECTROCARDIOGRAM TRACING: CPT | Performed by: EMERGENCY MEDICINE

## 2021-10-06 RX ORDER — SODIUM CHLORIDE 0.9 % (FLUSH) 0.9 %
5-10 SYRINGE (ML) INJECTION AS NEEDED
Status: DISCONTINUED | OUTPATIENT
Start: 2021-10-06 | End: 2021-10-06 | Stop reason: HOSPADM

## 2021-10-06 RX ORDER — MELOXICAM 15 MG/1
15 TABLET ORAL DAILY
Qty: 10 TABLET | Refills: 0 | Status: SHIPPED | OUTPATIENT
Start: 2021-10-06 | End: 2022-01-28 | Stop reason: ALTCHOICE

## 2021-10-06 RX ORDER — NALOXONE HYDROCHLORIDE 4 MG/.1ML
SPRAY NASAL
Qty: 1 EACH | Refills: 0 | Status: SHIPPED | OUTPATIENT
Start: 2021-10-06

## 2021-10-06 RX ORDER — SODIUM CHLORIDE 0.9 % (FLUSH) 0.9 %
5-10 SYRINGE (ML) INJECTION EVERY 8 HOURS
Status: DISCONTINUED | OUTPATIENT
Start: 2021-10-06 | End: 2021-10-06 | Stop reason: HOSPADM

## 2021-10-06 RX ORDER — HYDROMORPHONE HYDROCHLORIDE 1 MG/ML
0.5 INJECTION, SOLUTION INTRAMUSCULAR; INTRAVENOUS; SUBCUTANEOUS ONCE
Status: DISCONTINUED | OUTPATIENT
Start: 2021-10-06 | End: 2021-10-06

## 2021-10-06 RX ORDER — ONDANSETRON 2 MG/ML
4 INJECTION INTRAMUSCULAR; INTRAVENOUS
Status: COMPLETED | OUTPATIENT
Start: 2021-10-06 | End: 2021-10-06

## 2021-10-06 RX ORDER — HYDROCODONE BITARTRATE AND ACETAMINOPHEN 5; 325 MG/1; MG/1
1 TABLET ORAL
Qty: 10 TABLET | Refills: 0 | Status: SHIPPED | OUTPATIENT
Start: 2021-10-06 | End: 2021-10-09

## 2021-10-06 RX ORDER — HYDROMORPHONE HYDROCHLORIDE 1 MG/ML
0.5 INJECTION, SOLUTION INTRAMUSCULAR; INTRAVENOUS; SUBCUTANEOUS ONCE
Status: COMPLETED | OUTPATIENT
Start: 2021-10-06 | End: 2021-10-06

## 2021-10-06 RX ORDER — SODIUM CHLORIDE 0.9 % (FLUSH) 0.9 %
10 SYRINGE (ML) INJECTION
Status: COMPLETED | OUTPATIENT
Start: 2021-10-06 | End: 2021-10-06

## 2021-10-06 RX ADMIN — HYDROMORPHONE HYDROCHLORIDE 0.5 MG: 1 INJECTION, SOLUTION INTRAMUSCULAR; INTRAVENOUS; SUBCUTANEOUS at 18:13

## 2021-10-06 RX ADMIN — IOPAMIDOL 100 ML: 755 INJECTION, SOLUTION INTRAVENOUS at 16:31

## 2021-10-06 RX ADMIN — ONDANSETRON 4 MG: 2 INJECTION INTRAMUSCULAR; INTRAVENOUS at 15:47

## 2021-10-06 RX ADMIN — Medication 10 ML: at 16:32

## 2021-10-06 RX ADMIN — SODIUM CHLORIDE 100 ML: 900 INJECTION, SOLUTION INTRAVENOUS at 16:32

## 2021-10-06 NOTE — ED TRIAGE NOTES
Patient ambulatory to triage with mask in place. Patient reports right-sided chest pain that started 2 hours ago. Pt reports it radiates down her arm with numbness and pain. Pt reports some shob and nausea. Pt reports hot and cold flashes.

## 2021-10-06 NOTE — LETTER
[address] [facility] EMERGENCY DEPT   [facility] [address] [phone]    Work/School Note    Date: 10/6/2021    To Whom It May concern:    Kerwin Antoine was seen and treated today in the emergency room by the following provider(s):  Attending Provider: Marifer Vergara, [address] may return to work on 10/09/2021.     Sincerely,          Hilda Rodrigues RN

## 2021-10-06 NOTE — DISCHARGE INSTRUCTIONS
Rest.  May try heating pad or hot water bottle. Call your primary care doctor recheck. Medications as prescribed. Recheck sooner for worse or worrisome symptoms. As we discussed, you have a cyst in your liver and a spot in your lung. Both are most likely benign but we do recommend you follow-up with your primary care doctor to consider reimaging in 6 to 12 months.

## 2021-10-06 NOTE — ED NOTES
I have reviewed discharge instructions with the patient. The patient verbalized understanding. Patient left ED via Discharge Method: ambulatory to Home with spouse    Opportunity for questions and clarification provided. Patient given 3 scripts. To continue your aftercare when you leave the hospital, you may receive an automated call from our care team to check in on how you are doing. This is a free service and part of our promise to provide the best care and service to meet your aftercare needs.  If you have questions, or wish to unsubscribe from this service please call [phone]. Thank you for Choosing our [facility] Emergency Department.

## 2021-10-06 NOTE — ED PROVIDER NOTES
78-year-old female states onset of right chest pain into the right arm that started at about noon. Before that she had some tingling in her right upper extremity. No weakness. Chest pain is sharp but persistent. Radiates to her back. Short of breath. She has had nausea. She had 1 episode of vomiting this morning but that is her other chronic complaint. She has had chills but no fever. No abdominal pain. No low back pain. Denies any headache or visual changes. Pain like this before about 5 years ago. Review of records revealed patient had cardiac catheterization about 5 years ago that revealed normal coronary arteries but she had an aortic aneurysm of the ascending nature. No surgery. Also had bicuspid aortic valve. Is followed by cardiology. No history of DVT or PE. No particular leg weakness or numbness. No trouble swallowing or vision or speaking. No history of stroke. The history is provided by the patient. Chest Pain (Angina)   This is a new problem. The current episode started 3 to 5 hours ago. The problem has not changed since onset. The problem occurs constantly. The pain is associated with coughing and breathing. The pain is present in the right side. The pain is moderate. The quality of the pain is described as pressure-like, sharp and stabbing. The pain radiates to the mid back, upper back and right arm. Associated symptoms include back pain, lower extremity edema, nausea, numbness, shortness of breath and vomiting. Pertinent negatives include no abdominal pain, no cough, no diaphoresis, no dizziness, no fever, no headaches, no irregular heartbeat, no leg pain, no palpitations and no weakness. She has tried nothing for the symptoms. Risk factors include hypertension and cardiac disease. Her past medical history is significant for HTN. Her past medical history does not include DM, DVT or PE. Procedural history includes cardiac catheterization and cardiac stents.        Past Medical History:   Diagnosis Date    Bicuspid aortic valve     CAD (coronary artery disease)     Chronic pain 3/14/2016    Cigarette smoker 2016    Controlled type 2 diabetes mellitus without complication, with long-term current use of insulin ([facility] [address] 2018    Hypercholesterolemia 2018    Hypertension     Insomnia 3/14/2016    Obesity (BMI 30-39.9) 3/14/2016    Psychiatric disorder     anxiety , ADHD    Type 2 diabetes mellitus with peripheral vascular disease ([facility] [address] 2019    Unspecified adverse effect of anesthesia     high anxiety after surgery, hiper       Past Surgical History:   Procedure Laterality Date    COLONOSCOPY N/A 2016    COLONOSCOPY  BMI 30 ROOM 603 performed by Albaro Schaefer [facility] at [facility] ENDOSCOPY    HX HYSTERECTOMY           Family History:   Problem Relation Age of Onset    Other Mother         bipolar disorder    COPD Mother     High Cholesterol Mother     Diabetes Father     Hypertension Father     Cancer Father     Hypertension Sister     Diabetes Sister     Breast Cancer Neg Hx        Social History     Socioeconomic History    Marital status:      Spouse name: Not on file    Number of children: Not on file    Years of education: Not on file    Highest education level: Not on file   Occupational History    Not on file   Tobacco Use    Smoking status: Current Every Day Smoker     Packs/day: 0.50     Years: 15.00     Pack years: 7.50     Types: Cigarettes     Last attempt to quit: 2018     Years since quittin.8    Smokeless tobacco: Never Used    Tobacco comment: 30 yrPPD   Substance and Sexual Activity    Alcohol use: No     Alcohol/week: 0.0 standard drinks    Drug use: No    Sexual activity: Not on file   Other Topics Concern    Not on file   Social History Narrative    , not working.  2 children, 5 grandchildren     Social Determinants of Health     Financial Resource Strain:     Difficulty of Paying Living Expenses: Food Insecurity:     Worried About Running Out of Food in the Last Year:     [address] in the Last Year:    Transportation Needs:     Lack of Transportation (Medical):  Lack of Transportation (Non-Medical):    Physical Activity:     Days of Exercise per Week:     Minutes of Exercise per Session:    Stress:     Feeling of Stress :    Social Connections:     Frequency of Communication with Friends and Family:     Frequency of Social Gatherings with Friends and Family:     Attends Zoroastrian Services:     Active Member of Clubs or Organizations:     Attends Club or Organization Meetings:     Marital Status:    Intimate Partner Violence:     Fear of Current or Ex-Partner:     Emotionally Abused:     Physically Abused:     Sexually Abused: ALLERGIES: Patient has no known allergies. Review of Systems   Constitutional: Negative for diaphoresis and fever. Eyes: Negative for visual disturbance. Respiratory: Positive for shortness of breath. Negative for cough. Cardiovascular: Positive for chest pain. Negative for palpitations. Gastrointestinal: Positive for nausea and vomiting. Negative for abdominal pain. Musculoskeletal: Positive for back pain. Neurological: Positive for numbness. Negative for dizziness, weakness and headaches. All other systems reviewed and are negative. Vitals:    10/06/21 1441 10/06/21 1509   BP: 105/66 (!) 155/87   Pulse: 83 83   Resp: 16 16   Temp: 97.7 °F (36.5 °C)    SpO2: 99% 98%   Weight: 72.6 kg (160 lb)    Height: 5' 6\" (1.676 m)             Physical Exam  Vitals and nursing note reviewed. Constitutional:       General: She is in acute distress (Anxious). Appearance: She is well-developed. She is not ill-appearing. HENT:      Head: Normocephalic and atraumatic. Right Ear: External ear normal.      Left Ear: External ear normal.      Mouth/Throat:      Pharynx: No oropharyngeal exudate.    Eyes:      Extraocular Movements: Extraocular movements intact. Conjunctiva/sclera: Conjunctivae normal.      Pupils: Pupils are equal, round, and reactive to light. Cardiovascular:      Rate and Rhythm: Normal rate and regular rhythm. Pulses: Normal pulses. Heart sounds: No murmur heard. Pulmonary:      Effort: No respiratory distress. Breath sounds: Normal breath sounds. Abdominal:      Palpations: Abdomen is soft. Tenderness: There is no abdominal tenderness. Musculoskeletal:         General: No swelling or tenderness. Cervical back: Normal range of motion and neck supple. Right lower leg: No edema. Left lower leg: No edema. Skin:     General: Skin is warm and dry. Neurological:      Mental Status: She is alert and oriented to person, place, and time. Gait: Gait normal.      Comments: Nl speech  No drift. Normal finger-nose testing. Subjective decreased sensation right upper extremity. Hold either leg off the stretcher for 5 seconds. 2+ equal deep tendon reflexes. No facial asymmetry. Psychiatric:         Speech: Speech normal.          MDM  Number of Diagnoses or Management Options  Diagnosis management comments: Chest and back pain in a patient with history of known a surrounding aortic aneurysm. Some decreased sensation right arm but no objective neurologic findings. CT angio of chest and neck and tried extended into the brain. Plain CT first to make sure no evidence for bleeding.        Amount and/or Complexity of Data Reviewed  Clinical lab tests: ordered and reviewed  Tests in the radiology section of CPT®: ordered and reviewed  Tests in the medicine section of CPT®: ordered and reviewed  Review and summarize past medical records: yes  Discuss the patient with other providers: yes  Independent visualization of images, tracings, or specimens: yes    Risk of Complications, Morbidity, and/or Mortality  Presenting problems: moderate  Diagnostic procedures: low  Management options: low    Patient Progress  Patient progress: stable         Procedures    Results Include:    Recent Results (from the past 24 hour(s))   EKG, 12 LEAD, INITIAL    Collection Time: 10/06/21  2:41 PM   Result Value Ref Range    Ventricular Rate 82 BPM    Atrial Rate 82 BPM    P-R Interval 144 ms    QRS Duration 74 ms    Q-T Interval 350 ms    QTC Calculation (Bezet) 408 ms    Calculated P Axis 64 degrees    Calculated R Axis 49 degrees    Calculated T Axis 69 degrees    Diagnosis       Normal sinus rhythm  Normal ECG  No previous ECGs available  Confirmed by ST JEZ BREWER MD (), MARCELO LAWTON ([id number]) on 10/6/2021 4:08:30 PM     TROPONIN-HIGH SENSITIVITY    Collection Time: 10/06/21  3:09 PM   Result Value Ref Range    Troponin-High Sensitivity 8.4 0 - 14 pg/mL   CBC WITH AUTOMATED DIFF    Collection Time: 10/06/21  3:09 PM   Result Value Ref Range    WBC 5.6 4.3 - 11.1 K/uL    RBC 4.13 4.05 - 5.2 M/uL    HGB 13.6 11.7 - 15.4 g/dL    HCT 40.6 35.8 - 46.3 %    MCV 98.3 (H) 79.6 - 97.8 FL    MCH 32.9 26.1 - 32.9 PG    MCHC 33.5 31.4 - 35.0 g/dL    RDW 12.6 11.9 - 14.6 %    PLATELET 066 757 - 266 K/uL    MPV 12.5 (H) 9.4 - 12.3 FL    ABSOLUTE NRBC 0.00 0.0 - 0.2 K/uL    DF AUTOMATED      NEUTROPHILS 63 43 - 78 %    LYMPHOCYTES 28 13 - 44 %    MONOCYTES 6 4.0 - 12.0 %    EOSINOPHILS 1 0.5 - 7.8 %    BASOPHILS 1 0.0 - 2.0 %    IMMATURE GRANULOCYTES 0 0.0 - 5.0 %    ABS. NEUTROPHILS 3.6 1.7 - 8.2 K/UL    ABS. LYMPHOCYTES 1.6 0.5 - 4.6 K/UL    ABS. MONOCYTES 0.4 0.1 - 1.3 K/UL    ABS. EOSINOPHILS 0.1 0.0 - 0.8 K/UL    ABS. BASOPHILS 0.1 0.0 - 0.2 K/UL    ABS. IMM.  GRANS. 0.0 0.0 - 0.5 K/UL   METABOLIC PANEL, COMPREHENSIVE    Collection Time: 10/06/21  3:09 PM   Result Value Ref Range    Sodium 143 136 - 145 mmol/L    Potassium 3.7 3.5 - 5.1 mmol/L    Chloride 114 (H) 98 - 107 mmol/L    CO2 20 (L) 21 - 32 mmol/L    Anion gap 9 7 - 16 mmol/L    Glucose 92 65 - 100 mg/dL    BUN 18 6 - 23 MG/DL    Creatinine 1.01 (H) 0.6 - 1.0 MG/DL    GFR est AA >60 >60 ml/min/1.73m2    GFR est non-AA >60 >60 ml/min/1.73m2    Calcium 8.8 8.3 - 10.4 MG/DL    Bilirubin, total 0.3 0.2 - 1.1 MG/DL    ALT (SGPT) 14 12 - 65 U/L    AST (SGOT) 20 15 - 37 U/L    Alk. phosphatase 82 50 - 136 U/L    Protein, total 6.8 6.3 - 8.2 g/dL    Albumin 3.2 (L) 3.5 - 5.0 g/dL    Globulin 3.6 (H) 2.3 - 3.5 g/dL    A-G Ratio 0.9 (L) 1.2 - 3.5     LIPASE    Collection Time: 10/06/21  3:09 PM   Result Value Ref Range    Lipase 135 73 - 393 U/L   MAGNESIUM    Collection Time: 10/06/21  3:09 PM   Result Value Ref Range    Magnesium 2.3 1.8 - 2.4 mg/dL     XR CHEST PA LAT    Result Date: 10/6/2021  EXAMINATION: CHEST RADIOGRAPH 10/6/2021 4:28 PM ACCESSION NUMBER: [id number] INDICATION: Chest Pain COMPARISON: Chest x-ray 8/3/2021 TECHNIQUE: PA  and lateral views of the chest were obtained. FINDINGS: Cardiac Silhouette: Within normal limits in size. Lungs: No focal airspace disease. Pleura: No pleural effusion. No pneumothorax. Osseous Structures: Unremarkable. Upper Abdomen: Unremarkable. No evidence of acute cardiopulmonary disease. VOICE DICTATED BY: Dr. Nate Fowler CONT    Result Date: 10/6/2021  EXAMINATION: HEAD CT WITHOUT CONTRAST 10/6/2021 4:44 PM ACCESSION NUMBER: [id number] INDICATION: Right arm numbness COMPARISON: Same day CTA head and neck, brain MRI 7/18/2019 TECHNIQUE: Multiple-row detector helical CT examination of the head without intravenous contrast. Radiation dose reduction techniques were used for this study:  Our CT scanners use one or all of the following: Automated exposure control, adjustment of the mA and/or kVp according to patient's size, iterative reconstruction. FINDINGS: The ventricles are within normal limits for the mild degree of global brain parenchymal volume loss. There is no midline shift. The basilar cisterns are patent. There is no cerebellar tonsillar ectopia or herniation.  There is no evidence of acute intracranial hemorrhage or extra-axial collections. There is no CT evidence of acute infarction. The paranasal sinuses and mastoid air cells are well aerated and clear. No acute intracranial abnormality. VOICE DICTATED BY: Dr. Evelio ROJAS    Result Date: 10/6/2021  Title:  CT arteriogram of the neck and head. Indication: Pleuritic chest and back pain with known aortic aneurysm and right arm numbness. . Technique: Axial images of the neck and head were obtained after the uneventful administration of intravenous iodinated contrast media. Contrast was used to best identify the arterial structures. Images were reviewed on a separate, free standing, three-dimensional workstation as per the referring physicians request.  All stenosis percentages derived by comparing the narrowest segment with the distal Internal Carotid Artery luminal diameter, as described in the [place] [employer] Symptomatic Carotid Endarterectomy Trial (NASCET) criteria. All CT scans at this facility are performed using dose reduction/dose modulation techniques, as appropriate the performed exam, including the following: Automated Exposure Control; Adjustment of the mA and/or kV according to patient size (this includes techniques or standardized protocols for targeted exams where dose is matched to indication/reason for exam); and Use of Iterative Reconstruction Technique. Comparison: None. Findings: Lungs:  See dedicated CTA of the chest..  Bones:  No osseous destruction. .  Paranasal sinuses:  Paranasal sinuses are clear. .  Brain:  No midline shift. No enhancing mass lesion or hydrocephalus. .  Soft tissues:  Within normal limits. .  Dural venous sinuses:  Patent. Aortic arch:  See dedicated CTA of the chest..  ANTERIOR CIRCULATION Right common carotid artery:  No significant stenosis or occlusion. Right internal carotid artery:  No significant stenosis or occlusion.  Right middle cerebral artery:  No significant stenosis, occlusion, or aneurysm. Right anterior cerebral artery:  No significant stenosis, occlusion, or aneurysm. Left common carotid artery: No significant stenosis or occlusion. Left internal carotid artery:  No significant stenosis or occlusion. Left middle cerebral artery:  No significant stenosis, occlusion, or aneurysm. Left anterior cerebral artery:  No significant stenosis, occlusion, or aneurysm. Anterior communicating artery: No significant stenosis, occlusion, or aneurysm. POSTERIOR CIRCULATION Right vertebral artery:  No significant stenosis or occlusion. Left vertebral artery:  No significant stenosis or occlusion. Basilar artery:  No significant stenosis, occlusion, or aneurysm. Right posterior communicating artery: No significant stenosis, occlusion, or aneurysm. Left posterior communicating artery:  No significant stenosis, occlusion, or aneurysm. Right posterior cerebral artery:  No significant stenosis, occlusion, or aneurysm. Left posterior cerebral artery:  No significant stenosis, occlusion, or aneurysm. No acute large vessel occlusion or high-grade stenosis. Negative for cerebral aneurysm. CTA CHEST W OR W WO CONT    Result Date: 10/6/2021  CT angiogram of the chest Indication: Pleuritic chest and back pain. ? History of a sending aortic aneurysm, right upper extremity numbness CT angiogram of the chest was obtained following the administration of intravenous contrast. IV contrast was administered to evaluate the arterial vasculature. Reformatted images in the coronal sagittal planes as well as 3-D imaging was obtained reviewed on dedicated PACs and a Recorded [employer] workstation. All CT scans at this facility are performed using dose reduction/dose modulation techniques, as appropriate the performed exam, including the following: Automated Exposure Control;  Adjustment of the mA and/or kV according to patient size (this includes techniques or standardized protocols for targeted exams where dose is matched to indication/reason for exam); and Use of Iterative Reconstruction Technique. Comparison: CTA 10/2/2020 Findings: -LUNGS: Mild atelectatic changes are present in the dependent portions of the lungs bilaterally. There are 2 punctate pulmonary nodules in the right upper lobe. At least one of these contains calcium, consistent with a granuloma. . No focal consolidation or pleural effusions. No pneumothorax. -MEDIASTINUM/AXILLA: No significant adenopathy. -HEART/VESSELS: Normal. -BONES: No destructive bony lesions. Your body heights appear to be maintained. -UPPER ABDOMEN: Small hypervascular lesion left hepatic lobe measuring 7 x 6 mm in diameter as seen on series 304, image 379. VASCULAR: -AORTA: Aneurysmal dilation of the ascending aorta which measures 4.8 x 4.7 cm in diameter. The sinus of Valsalva measures approximately 4.3 cm in diameter. The sinotubular junction measures approximately 3.6 cm in diameter. Negative for aortic dissection. No significant thoracic aortic stenosis. The transverse portion of the aortic arch and descending aorta are nonaneurysmal. -Great vessels of the thoracic aorta: Normal. -Pulmonary arteries: No proximal pulmonary embolus. 1.  Aneurysmal dilation of the ascending aorta measuring 4.8 x 4.7 cm with no acute findings. Previously this measured 4.8 x 4.5 cm approximately one year ago. 2.  Small hypervascular lesion left hepatic lobe measuring up to 7 mm. This could represent a flash filling hemangioma. An intraparenchymal aneurysm would be unusual, but could have this appearance. Consider follow-up imaging with contrast-enhanced MRI. 3.  There are 2 punctate pulmonary nodules in the right upper lobe. At least one of these contains calcium, consistent with a granuloma. If the patient has risk factors such as smoking, consider follow-up chest CT in 12 months. Notified patient about liver and lung findings for follow-up with primary care doctor.

## 2021-11-15 PROCEDURE — 88312 SPECIAL STAINS GROUP 1: CPT

## 2021-11-15 PROCEDURE — 88305 TISSUE EXAM BY PATHOLOGIST: CPT

## 2021-11-16 ENCOUNTER — HOSPITAL ENCOUNTER (OUTPATIENT)
Dept: LAB | Age: 46
Discharge: HOME | End: 2021-11-16

## 2021-11-19 PROBLEM — F41.9 ANXIETY: Status: ACTIVE | Noted: 2021-11-19

## 2022-01-28 PROBLEM — E11.9 TYPE 2 DIABETES MELLITUS (HCC): Status: ACTIVE | Noted: 2022-01-28

## 2022-01-30 PROBLEM — K29.70 GASTRITIS WITHOUT BLEEDING: Status: ACTIVE | Noted: 2022-01-30

## 2022-01-30 PROBLEM — K26.9 DUODENAL ULCER: Status: ACTIVE | Noted: 2022-01-30

## 2022-02-08 RX ORDER — CEFAZOLIN SODIUM/WATER 2 G/20 ML
2 SYRINGE (ML) INTRAVENOUS ONCE
Status: CANCELLED | OUTPATIENT
Start: 2022-02-08 | End: 2022-02-08

## 2022-02-11 PROBLEM — E11.9 TYPE 2 DIABETES MELLITUS (HCC): Status: RESOLVED | Noted: 2022-01-28 | Resolved: 2022-02-11

## 2022-02-11 PROBLEM — Z86.39 HISTORY OF DIABETES MELLITUS, TYPE II: Status: ACTIVE | Noted: 2022-02-11

## 2022-02-16 ENCOUNTER — HOSPITAL ENCOUNTER (OUTPATIENT)
Dept: SURGERY | Age: 47
Discharge: HOME | End: 2022-02-16
Payer: MEDICARE

## 2022-02-16 VITALS
SYSTOLIC BLOOD PRESSURE: 123 MMHG | OXYGEN SATURATION: 100 % | BODY MASS INDEX: 23.54 KG/M2 | HEART RATE: 66 BPM | HEIGHT: 67 IN | WEIGHT: 150 LBS | RESPIRATION RATE: 18 BRPM | DIASTOLIC BLOOD PRESSURE: 60 MMHG | TEMPERATURE: 97.7 F

## 2022-02-16 LAB
APPEARANCE UR: CLEAR
BACTERIA SPEC CULT: NORMAL
BASOPHILS # BLD: 0 K/UL (ref 0–0.2)
BASOPHILS NFR BLD: 1 % (ref 0–2)
BILIRUB UR QL: ABNORMAL
COLOR UR: ABNORMAL
DIFFERENTIAL METHOD BLD: ABNORMAL
EOSINOPHIL # BLD: 0.1 K/UL (ref 0–0.8)
EOSINOPHIL NFR BLD: 2 % (ref 0.5–7.8)
ERYTHROCYTE [DISTWIDTH] IN BLOOD BY AUTOMATED COUNT: 12.3 % (ref 11.9–14.6)
GLUCOSE UR STRIP.AUTO-MCNC: NEGATIVE MG/DL
HCT VFR BLD AUTO: 36.3 % (ref 35.8–46.3)
HGB BLD-MCNC: 12.7 G/DL (ref 11.7–15.4)
HGB UR QL STRIP: NEGATIVE
IMM GRANULOCYTES # BLD AUTO: 0 K/UL (ref 0–0.5)
IMM GRANULOCYTES NFR BLD AUTO: 0 % (ref 0–5)
KETONES UR QL STRIP.AUTO: NEGATIVE MG/DL
LEUKOCYTE ESTERASE UR QL STRIP.AUTO: NEGATIVE
LYMPHOCYTES # BLD: 1.5 K/UL (ref 0.5–4.6)
LYMPHOCYTES NFR BLD: 31 % (ref 13–44)
MCH RBC QN AUTO: 34.8 PG (ref 26.1–32.9)
MCHC RBC AUTO-ENTMCNC: 35 G/DL (ref 31.4–35)
MCV RBC AUTO: 99.5 FL (ref 79.6–97.8)
MONOCYTES # BLD: 0.4 K/UL (ref 0.1–1.3)
MONOCYTES NFR BLD: 8 % (ref 4–12)
NEUTS SEG # BLD: 2.8 K/UL (ref 1.7–8.2)
NEUTS SEG NFR BLD: 58 % (ref 43–78)
NITRITE UR QL STRIP.AUTO: NEGATIVE
NRBC # BLD: 0 K/UL (ref 0–0.2)
PH UR STRIP: 6.5 [PH] (ref 5–9)
PLATELET # BLD AUTO: 78 K/UL (ref 150–450)
PMV BLD AUTO: 13.4 FL (ref 9.4–12.3)
PROT UR STRIP-MCNC: NEGATIVE MG/DL
RBC # BLD AUTO: 3.65 M/UL (ref 4.05–5.2)
SERVICE CMNT-IMP: NORMAL
SP GR UR REFRACTOMETRY: 1.03 (ref 1–1.02)
UROBILINOGEN UR QL STRIP.AUTO: 1 EU/DL (ref 0.2–1)
WBC # BLD AUTO: 4.8 K/UL (ref 4.3–11.1)

## 2022-02-16 PROCEDURE — 77030027138 HC INCENT SPIROMETER -A

## 2022-02-16 PROCEDURE — 87641 MR-STAPH DNA AMP PROBE: CPT

## 2022-02-16 PROCEDURE — 81003 URINALYSIS AUTO W/O SCOPE: CPT

## 2022-02-16 PROCEDURE — 85025 COMPLETE CBC W/AUTO DIFF WBC: CPT

## 2022-02-16 RX ORDER — UREA 10 %
100 LOTION (ML) TOPICAL DAILY
COMMUNITY

## 2022-02-16 NOTE — PERIOP NOTES
Patient verified name, , and surgery as listed in [facility]. Patient provided medical/health information and PTA medications to the best of their ability. TYPE  CASE: 3  Orders per surgeon: were received  Labs per surgeon: mrsa/mssa collected and results in [facility]. Labs per anesthesia protocol: hgba1c, bmp results are in [facility] care from 2022 cbc w/diff, ua collected and results in [facility]  EKG:  EKG in [facility] from 10/06/2021 and results wdl of anesthesia protocol    Patient COVID test date  The testing center is located at the . [address], [place]. If appointment is needed patient provided telephone number of [phone]. Nasal Swab collected per MD order. Patient provided with and instructed on education handouts including Guide to Surgery, blood transfusions, pain management, and hand hygiene for the family and community, and [facility] brochure. Road to Recovery Spine surgery patient guide given. Instructed on incentive spirometry . Patient viewed spine prehab video. Dyn-hex and instructions given per hospital policy. Original medication prescription bottles not visualized during patient appointment. Patient teach back successful and patient demonstrates knowledge of instruction. PLEASE CONTINUE TAKING ALL PRESCRIPTION MEDICATIONS UP TO THE DAY OF SURGERY UNLESS OTHERWISE DIRECTED BELOW. DISCONTINUE all vitamins VITAMIN B12 AND VITAMIN D and supplements 7 days prior to surgery. DISCONTINUE Non-Steriodal Anti-Inflammatory (NSAIDS) such as Advil and Aleve 5 days prior to surgery.      Home Medications to take  the day of surgery      KLONOPIN IF NEEDED     DEPAKOTE     GABAPENTIN    OMEPRAZOLE    ZOFRAN IF NEEDED    FLOMAX    TOPAMAX     Home Medications   to Hold   HOLD LISINOPRIL MORNING OF SURGERY        Comments    [place] [address] @ [address], [place]    On the day before surgery please take Acetaminophen 1000mg in the morning and then again before bed. You may substitute for Tylenol 650 mg. Please do not bring home medications with you on the day of surgery unless otherwise directed by your nurse. If you are instructed to bring home medications, please give them to your nurse as they will be administered by the nursing staff. If you have any questions, please call [facility] [phone] or [facility] [phone]. A copy of this note was provided to the patient for reference.

## 2022-02-22 ENCOUNTER — ANESTHESIA EVENT (OUTPATIENT)
Dept: SURGERY | Age: 47
End: 2022-02-22
Payer: MEDICARE

## 2022-02-23 ENCOUNTER — APPOINTMENT (OUTPATIENT)
Dept: GENERAL RADIOLOGY | Age: 47
End: 2022-02-23
Attending: ORTHOPAEDIC SURGERY
Payer: MEDICARE

## 2022-02-23 ENCOUNTER — ANESTHESIA (OUTPATIENT)
Dept: SURGERY | Age: 47
End: 2022-02-23
Payer: MEDICARE

## 2022-02-23 ENCOUNTER — HOSPITAL ENCOUNTER (OUTPATIENT)
Age: 47
Discharge: HOME HEALTH | End: 2022-02-24
Attending: ORTHOPAEDIC SURGERY | Admitting: ORTHOPAEDIC SURGERY
Payer: MEDICARE

## 2022-02-23 DIAGNOSIS — Z98.1 STATUS POST LUMBAR SPINAL ARTHRODESIS: Primary | ICD-10-CM

## 2022-02-23 DIAGNOSIS — M48.062 LUMBAR STENOSIS WITH NEUROGENIC CLAUDICATION: ICD-10-CM

## 2022-02-23 LAB
ABO + RH BLD: NORMAL
BLOOD GROUP ANTIBODIES SERPL: NORMAL
SPECIMEN EXP DATE BLD: NORMAL

## 2022-02-23 PROCEDURE — 77030038269 HC DRN EXT URIN PURWCK BARD -A

## 2022-02-23 PROCEDURE — 77030039425 HC BLD LARYNG TRULITE DISP TELE -A: Performed by: ANESTHESIOLOGY

## 2022-02-23 PROCEDURE — 72100 X-RAY EXAM L-S SPINE 2/3 VWS: CPT

## 2022-02-23 PROCEDURE — 77030032817 HC GRFT BN CHIPS STRY -C: Performed by: ORTHOPAEDIC SURGERY

## 2022-02-23 PROCEDURE — 77030040922 HC BLNKT HYPOTHRM STRY -A: Performed by: ANESTHESIOLOGY

## 2022-02-23 PROCEDURE — 76060000035 HC ANESTHESIA 2 TO 2.5 HR: Performed by: ORTHOPAEDIC SURGERY

## 2022-02-23 PROCEDURE — C1713 ANCHOR/SCREW BN/BN,TIS/BN: HCPCS | Performed by: ORTHOPAEDIC SURGERY

## 2022-02-23 PROCEDURE — 77030037088 HC TUBE ENDOTRACH ORAL NSL COVD-A: Performed by: ANESTHESIOLOGY

## 2022-02-23 PROCEDURE — 77030038601 HC DEV SYS W/CANN LITE BIO STRY -F: Performed by: ORTHOPAEDIC SURGERY

## 2022-02-23 PROCEDURE — 74011000272 HC RX REV CODE- 272: Performed by: ORTHOPAEDIC SURGERY

## 2022-02-23 PROCEDURE — 74011250636 HC RX REV CODE- 250/636: Performed by: ORTHOPAEDIC SURGERY

## 2022-02-23 PROCEDURE — 22840 INSERT SPINE FIXATION DEVICE: CPT | Performed by: ORTHOPAEDIC SURGERY

## 2022-02-23 PROCEDURE — 63052 LAM FACETC/FRMT ARTHRD LUM 1: CPT | Performed by: ORTHOPAEDIC SURGERY

## 2022-02-23 PROCEDURE — 77030037157 HC TAP SPN MOD XIA DISP STRY -C: Performed by: ORTHOPAEDIC SURGERY

## 2022-02-23 PROCEDURE — 74011000250 HC RX REV CODE- 250: Performed by: NURSE ANESTHETIST, CERTIFIED REGISTERED

## 2022-02-23 PROCEDURE — 74011250636 HC RX REV CODE- 250/636: Performed by: ANESTHESIOLOGY

## 2022-02-23 PROCEDURE — 2709999900 HC NON-CHARGEABLE SUPPLY: Performed by: ORTHOPAEDIC SURGERY

## 2022-02-23 PROCEDURE — 77030040830 HC CATH URETH FOL MDII -A: Performed by: ORTHOPAEDIC SURGERY

## 2022-02-23 PROCEDURE — 77030019908 HC STETH ESOPH SIMS -A: Performed by: ANESTHESIOLOGY

## 2022-02-23 PROCEDURE — 77030041392 HC GRFT BN PROT GRWTH FCTR BSYC -G1: Performed by: ORTHOPAEDIC SURGERY

## 2022-02-23 PROCEDURE — 76210000006 HC OR PH I REC 0.5 TO 1 HR: Performed by: ORTHOPAEDIC SURGERY

## 2022-02-23 PROCEDURE — 76010000162 HC OR TIME 1.5 TO 2 HR INTENSV-TIER 1: Performed by: ORTHOPAEDIC SURGERY

## 2022-02-23 PROCEDURE — 77030028270 HC SRGFL HEMSTAT MTRX J&J -C: Performed by: ORTHOPAEDIC SURGERY

## 2022-02-23 PROCEDURE — 77030034475 HC MISC IMPL SPN: Performed by: ORTHOPAEDIC SURGERY

## 2022-02-23 PROCEDURE — 74011250637 HC RX REV CODE- 250/637: Performed by: ORTHOPAEDIC SURGERY

## 2022-02-23 PROCEDURE — 22853 INSJ BIOMECHANICAL DEVICE: CPT | Performed by: ORTHOPAEDIC SURGERY

## 2022-02-23 PROCEDURE — 74011250636 HC RX REV CODE- 250/636: Performed by: NURSE ANESTHETIST, CERTIFIED REGISTERED

## 2022-02-23 PROCEDURE — 77030031139 HC SUT VCRL2 J&J -A: Performed by: ORTHOPAEDIC SURGERY

## 2022-02-23 PROCEDURE — 74011250637 HC RX REV CODE- 250/637: Performed by: ANESTHESIOLOGY

## 2022-02-23 PROCEDURE — 77030037155 HC BLCKR SPN CAP LOK AXI STRY -B: Performed by: ORTHOPAEDIC SURGERY

## 2022-02-23 PROCEDURE — 77030037158 HC BIT DRL SPN XIA DISP STRY -C: Performed by: ORTHOPAEDIC SURGERY

## 2022-02-23 PROCEDURE — 2709999900 HC NON-CHARGEABLE SUPPLY

## 2022-02-23 PROCEDURE — 77030019557 HC ELECTRD VES SEAL MEDT -F: Performed by: ORTHOPAEDIC SURGERY

## 2022-02-23 PROCEDURE — 86900 BLOOD TYPING SEROLOGIC ABO: CPT

## 2022-02-23 PROCEDURE — 22633 ARTHRD CMBN 1NTRSPC LUMBAR: CPT | Performed by: ORTHOPAEDIC SURGERY

## 2022-02-23 PROCEDURE — 74011000250 HC RX REV CODE- 250: Performed by: ORTHOPAEDIC SURGERY

## 2022-02-23 PROCEDURE — 77030034479 HC ADH SKN CLSR PRINEO J&J -B: Performed by: ORTHOPAEDIC SURGERY

## 2022-02-23 DEVICE — POLYAXIAL CORTICAL SCREW
Type: IMPLANTABLE DEVICE | Site: SPINE LUMBAR | Status: FUNCTIONAL
Brand: XIA 4.5 SYSTEM -  XIA CT

## 2022-02-23 DEVICE — ALLOGRAFT BNE CHIP 1-4 MM 5 CC CRUSH CANC: Type: IMPLANTABLE DEVICE | Site: SPINE LUMBAR | Status: FUNCTIONAL

## 2022-02-23 DEVICE — BLOCKER
Type: IMPLANTABLE DEVICE | Site: SPINE LUMBAR | Status: FUNCTIONAL
Brand: XIA 4.5 SYSTEM -  XIA CT

## 2022-02-23 DEVICE — GRAFT BNE MED: Type: IMPLANTABLE DEVICE | Site: SPINE LUMBAR | Status: FUNCTIONAL

## 2022-02-23 DEVICE — VITALLIUM PREBENT AND PRECUT ROD WITHOUT HEX
Type: IMPLANTABLE DEVICE | Site: SPINE LUMBAR | Status: FUNCTIONAL
Brand: XIA 4 5

## 2022-02-23 DEVICE — BIO DBM PLUS PUTTY (WITH CANCELLOUS)
Type: IMPLANTABLE DEVICE | Site: SPINE LUMBAR | Status: FUNCTIONAL
Brand: BIO DBM

## 2022-02-23 DEVICE — SPACER SPNL 15 DEG SM 28X10 MM STRL PROLIFT: Type: IMPLANTABLE DEVICE | Site: SPINE LUMBAR | Status: FUNCTIONAL

## 2022-02-23 RX ORDER — CEFAZOLIN SODIUM/WATER 2 G/20 ML
2 SYRINGE (ML) INTRAVENOUS EVERY 8 HOURS
Status: COMPLETED | OUTPATIENT
Start: 2022-02-23 | End: 2022-02-24

## 2022-02-23 RX ORDER — ACETAMINOPHEN 325 MG/1
650 TABLET ORAL EVERY 6 HOURS
Status: DISCONTINUED | OUTPATIENT
Start: 2022-02-23 | End: 2022-02-24 | Stop reason: HOSPADM

## 2022-02-23 RX ORDER — LIDOCAINE HYDROCHLORIDE 20 MG/ML
INJECTION, SOLUTION EPIDURAL; INFILTRATION; INTRACAUDAL; PERINEURAL AS NEEDED
Status: DISCONTINUED | OUTPATIENT
Start: 2022-02-23 | End: 2022-02-23 | Stop reason: HOSPADM

## 2022-02-23 RX ORDER — OXYCODONE HYDROCHLORIDE 5 MG/1
10 TABLET ORAL
Status: DISCONTINUED | OUTPATIENT
Start: 2022-02-23 | End: 2022-02-23 | Stop reason: HOSPADM

## 2022-02-23 RX ORDER — PROCHLORPERAZINE EDISYLATE 5 MG/ML
2.5 INJECTION INTRAMUSCULAR; INTRAVENOUS
Status: DISCONTINUED | OUTPATIENT
Start: 2022-02-23 | End: 2022-02-23 | Stop reason: HOSPADM

## 2022-02-23 RX ORDER — GLYCOPYRROLATE 0.2 MG/ML
INJECTION INTRAMUSCULAR; INTRAVENOUS AS NEEDED
Status: DISCONTINUED | OUTPATIENT
Start: 2022-02-23 | End: 2022-02-23 | Stop reason: HOSPADM

## 2022-02-23 RX ORDER — CEFAZOLIN SODIUM/WATER 2 G/20 ML
2 SYRINGE (ML) INTRAVENOUS ONCE
Status: COMPLETED | OUTPATIENT
Start: 2022-02-23 | End: 2022-02-23

## 2022-02-23 RX ORDER — ESMOLOL HYDROCHLORIDE 10 MG/ML
INJECTION INTRAVENOUS AS NEEDED
Status: DISCONTINUED | OUTPATIENT
Start: 2022-02-23 | End: 2022-02-23 | Stop reason: HOSPADM

## 2022-02-23 RX ORDER — OXYCODONE HYDROCHLORIDE 5 MG/1
5 TABLET ORAL
Qty: 28 TABLET | Refills: 0 | Status: SHIPPED | OUTPATIENT
Start: 2022-02-23 | End: 2022-03-02

## 2022-02-23 RX ORDER — FENTANYL CITRATE 50 UG/ML
INJECTION, SOLUTION INTRAMUSCULAR; INTRAVENOUS AS NEEDED
Status: DISCONTINUED | OUTPATIENT
Start: 2022-02-23 | End: 2022-02-23 | Stop reason: HOSPADM

## 2022-02-23 RX ORDER — ZOLPIDEM TARTRATE 5 MG/1
10 TABLET ORAL
Status: DISCONTINUED | OUTPATIENT
Start: 2022-02-23 | End: 2022-02-24 | Stop reason: HOSPADM

## 2022-02-23 RX ORDER — SODIUM CHLORIDE, SODIUM LACTATE, POTASSIUM CHLORIDE, CALCIUM CHLORIDE 600; 310; 30; 20 MG/100ML; MG/100ML; MG/100ML; MG/100ML
100 INJECTION, SOLUTION INTRAVENOUS CONTINUOUS
Status: ACTIVE | OUTPATIENT
Start: 2022-02-23 | End: 2022-02-23

## 2022-02-23 RX ORDER — LISINOPRIL 20 MG/1
20 TABLET ORAL DAILY
Status: DISCONTINUED | OUTPATIENT
Start: 2022-02-24 | End: 2022-02-24 | Stop reason: HOSPADM

## 2022-02-23 RX ORDER — DIVALPROEX SODIUM 500 MG/1
1000 TABLET, EXTENDED RELEASE ORAL
Status: DISCONTINUED | OUTPATIENT
Start: 2022-02-23 | End: 2022-02-24 | Stop reason: HOSPADM

## 2022-02-23 RX ORDER — DEXAMETHASONE SODIUM PHOSPHATE 4 MG/ML
INJECTION, SOLUTION INTRA-ARTICULAR; INTRALESIONAL; INTRAMUSCULAR; INTRAVENOUS; SOFT TISSUE AS NEEDED
Status: DISCONTINUED | OUTPATIENT
Start: 2022-02-23 | End: 2022-02-23 | Stop reason: HOSPADM

## 2022-02-23 RX ORDER — DIPHENHYDRAMINE HYDROCHLORIDE 50 MG/ML
12.5 INJECTION, SOLUTION INTRAMUSCULAR; INTRAVENOUS
Status: DISCONTINUED | OUTPATIENT
Start: 2022-02-23 | End: 2022-02-24 | Stop reason: HOSPADM

## 2022-02-23 RX ORDER — DIPHENHYDRAMINE HCL 25 MG
25 CAPSULE ORAL
Status: DISCONTINUED | OUTPATIENT
Start: 2022-02-23 | End: 2022-02-24 | Stop reason: HOSPADM

## 2022-02-23 RX ORDER — CEFAZOLIN SODIUM/WATER 2 G/20 ML
SYRINGE (ML) INTRAVENOUS AS NEEDED
Status: DISCONTINUED | OUTPATIENT
Start: 2022-02-23 | End: 2022-02-23 | Stop reason: HOSPADM

## 2022-02-23 RX ORDER — ACETAMINOPHEN 500 MG
1000 TABLET ORAL ONCE
Status: COMPLETED | OUTPATIENT
Start: 2022-02-23 | End: 2022-02-23

## 2022-02-23 RX ORDER — KETAMINE HYDROCHLORIDE 50 MG/ML
INJECTION, SOLUTION INTRAMUSCULAR; INTRAVENOUS AS NEEDED
Status: DISCONTINUED | OUTPATIENT
Start: 2022-02-23 | End: 2022-02-23 | Stop reason: HOSPADM

## 2022-02-23 RX ORDER — NEOSTIGMINE METHYLSULFATE 1 MG/ML
INJECTION, SOLUTION INTRAVENOUS AS NEEDED
Status: DISCONTINUED | OUTPATIENT
Start: 2022-02-23 | End: 2022-02-23 | Stop reason: HOSPADM

## 2022-02-23 RX ORDER — SODIUM CHLORIDE, SODIUM LACTATE, POTASSIUM CHLORIDE, CALCIUM CHLORIDE 600; 310; 30; 20 MG/100ML; MG/100ML; MG/100ML; MG/100ML
75 INJECTION, SOLUTION INTRAVENOUS CONTINUOUS
Status: DISPENSED | OUTPATIENT
Start: 2022-02-23 | End: 2022-02-24

## 2022-02-23 RX ORDER — VANCOMYCIN HYDROCHLORIDE 1 G/20ML
INJECTION, POWDER, LYOPHILIZED, FOR SOLUTION INTRAVENOUS AS NEEDED
Status: DISCONTINUED | OUTPATIENT
Start: 2022-02-23 | End: 2022-02-23 | Stop reason: HOSPADM

## 2022-02-23 RX ORDER — MIDAZOLAM HYDROCHLORIDE 1 MG/ML
2 INJECTION, SOLUTION INTRAMUSCULAR; INTRAVENOUS
Status: COMPLETED | OUTPATIENT
Start: 2022-02-23 | End: 2022-02-23

## 2022-02-23 RX ORDER — HYDROMORPHONE HYDROCHLORIDE 2 MG/ML
INJECTION, SOLUTION INTRAMUSCULAR; INTRAVENOUS; SUBCUTANEOUS AS NEEDED
Status: DISCONTINUED | OUTPATIENT
Start: 2022-02-23 | End: 2022-02-23 | Stop reason: HOSPADM

## 2022-02-23 RX ORDER — TAMSULOSIN HYDROCHLORIDE 0.4 MG/1
0.4 CAPSULE ORAL DAILY
Status: DISCONTINUED | OUTPATIENT
Start: 2022-02-24 | End: 2022-02-24 | Stop reason: HOSPADM

## 2022-02-23 RX ORDER — EPHEDRINE SULFATE/0.9% NACL/PF 50 MG/5 ML
SYRINGE (ML) INTRAVENOUS AS NEEDED
Status: DISCONTINUED | OUTPATIENT
Start: 2022-02-23 | End: 2022-02-23 | Stop reason: HOSPADM

## 2022-02-23 RX ORDER — OXYCODONE HYDROCHLORIDE 5 MG/1
5-10 TABLET ORAL
Status: DISCONTINUED | OUTPATIENT
Start: 2022-02-23 | End: 2022-02-24 | Stop reason: HOSPADM

## 2022-02-23 RX ORDER — PROPOFOL 10 MG/ML
INJECTION, EMULSION INTRAVENOUS AS NEEDED
Status: DISCONTINUED | OUTPATIENT
Start: 2022-02-23 | End: 2022-02-23 | Stop reason: HOSPADM

## 2022-02-23 RX ORDER — SODIUM CHLORIDE, SODIUM LACTATE, POTASSIUM CHLORIDE, CALCIUM CHLORIDE 600; 310; 30; 20 MG/100ML; MG/100ML; MG/100ML; MG/100ML
100 INJECTION, SOLUTION INTRAVENOUS CONTINUOUS
Status: DISCONTINUED | OUTPATIENT
Start: 2022-02-23 | End: 2022-02-23 | Stop reason: HOSPADM

## 2022-02-23 RX ORDER — HYDROMORPHONE HYDROCHLORIDE 2 MG/ML
0.5 INJECTION, SOLUTION INTRAMUSCULAR; INTRAVENOUS; SUBCUTANEOUS
Status: DISCONTINUED | OUTPATIENT
Start: 2022-02-23 | End: 2022-02-23 | Stop reason: HOSPADM

## 2022-02-23 RX ORDER — DOCUSATE SODIUM 100 MG/1
100 CAPSULE, LIQUID FILLED ORAL 2 TIMES DAILY
Status: DISCONTINUED | OUTPATIENT
Start: 2022-02-23 | End: 2022-02-24 | Stop reason: HOSPADM

## 2022-02-23 RX ORDER — NALOXONE HYDROCHLORIDE 0.4 MG/ML
0.4 INJECTION, SOLUTION INTRAMUSCULAR; INTRAVENOUS; SUBCUTANEOUS
Status: DISCONTINUED | OUTPATIENT
Start: 2022-02-23 | End: 2022-02-24 | Stop reason: HOSPADM

## 2022-02-23 RX ORDER — ONDANSETRON 2 MG/ML
INJECTION INTRAMUSCULAR; INTRAVENOUS AS NEEDED
Status: DISCONTINUED | OUTPATIENT
Start: 2022-02-23 | End: 2022-02-23 | Stop reason: HOSPADM

## 2022-02-23 RX ORDER — GABAPENTIN 300 MG/1
600 CAPSULE ORAL 3 TIMES DAILY
Status: DISCONTINUED | OUTPATIENT
Start: 2022-02-23 | End: 2022-02-24 | Stop reason: HOSPADM

## 2022-02-23 RX ORDER — SODIUM CHLORIDE 0.9 % (FLUSH) 0.9 %
5-40 SYRINGE (ML) INJECTION AS NEEDED
Status: DISCONTINUED | OUTPATIENT
Start: 2022-02-23 | End: 2022-02-24 | Stop reason: HOSPADM

## 2022-02-23 RX ORDER — SODIUM CHLORIDE 0.9 % (FLUSH) 0.9 %
5-40 SYRINGE (ML) INJECTION EVERY 8 HOURS
Status: DISCONTINUED | OUTPATIENT
Start: 2022-02-23 | End: 2022-02-24 | Stop reason: HOSPADM

## 2022-02-23 RX ORDER — MORPHINE SULFATE 4 MG/ML
2 INJECTION INTRAVENOUS
Status: DISCONTINUED | OUTPATIENT
Start: 2022-02-23 | End: 2022-02-24 | Stop reason: HOSPADM

## 2022-02-23 RX ORDER — LIDOCAINE HYDROCHLORIDE 10 MG/ML
0.3 INJECTION INFILTRATION; PERINEURAL ONCE
Status: DISCONTINUED | OUTPATIENT
Start: 2022-02-23 | End: 2022-02-23 | Stop reason: HOSPADM

## 2022-02-23 RX ORDER — CLONAZEPAM 0.5 MG/1
1 TABLET ORAL
Status: DISCONTINUED | OUTPATIENT
Start: 2022-02-23 | End: 2022-02-24 | Stop reason: HOSPADM

## 2022-02-23 RX ORDER — FAMOTIDINE 20 MG/1
20 TABLET, FILM COATED ORAL EVERY 12 HOURS
Status: DISCONTINUED | OUTPATIENT
Start: 2022-02-23 | End: 2022-02-24 | Stop reason: HOSPADM

## 2022-02-23 RX ORDER — ONDANSETRON 2 MG/ML
4 INJECTION INTRAMUSCULAR; INTRAVENOUS
Status: DISCONTINUED | OUTPATIENT
Start: 2022-02-23 | End: 2022-02-24 | Stop reason: HOSPADM

## 2022-02-23 RX ORDER — ROCURONIUM BROMIDE 10 MG/ML
INJECTION, SOLUTION INTRAVENOUS AS NEEDED
Status: DISCONTINUED | OUTPATIENT
Start: 2022-02-23 | End: 2022-02-23 | Stop reason: HOSPADM

## 2022-02-23 RX ADMIN — MIDAZOLAM 1 MG: 1 INJECTION INTRAMUSCULAR; INTRAVENOUS at 09:01

## 2022-02-23 RX ADMIN — DIPHENHYDRAMINE HYDROCHLORIDE 12.5 MG: 50 INJECTION, SOLUTION INTRAMUSCULAR; INTRAVENOUS at 13:39

## 2022-02-23 RX ADMIN — TOPIRAMATE 75 MG: 50 TABLET, FILM COATED ORAL at 17:06

## 2022-02-23 RX ADMIN — CEFAZOLIN 2 G: 10 INJECTION, POWDER, FOR SOLUTION INTRAVENOUS at 22:03

## 2022-02-23 RX ADMIN — GABAPENTIN 600 MG: 300 CAPSULE ORAL at 22:02

## 2022-02-23 RX ADMIN — ACETAMINOPHEN 650 MG: 325 TABLET ORAL at 17:06

## 2022-02-23 RX ADMIN — Medication 1 AMPULE: at 22:02

## 2022-02-23 RX ADMIN — HYDROMORPHONE HYDROCHLORIDE 0.4 MG: 2 INJECTION INTRAMUSCULAR; INTRAVENOUS; SUBCUTANEOUS at 10:49

## 2022-02-23 RX ADMIN — GABAPENTIN 600 MG: 300 CAPSULE ORAL at 17:06

## 2022-02-23 RX ADMIN — Medication 2 G: at 10:13

## 2022-02-23 RX ADMIN — FAMOTIDINE 20 MG: 20 TABLET, FILM COATED ORAL at 22:02

## 2022-02-23 RX ADMIN — SODIUM CHLORIDE, SODIUM LACTATE, POTASSIUM CHLORIDE, AND CALCIUM CHLORIDE: 600; 310; 30; 20 INJECTION, SOLUTION INTRAVENOUS at 10:59

## 2022-02-23 RX ADMIN — PROPOFOL 120 MG: 10 INJECTION, EMULSION INTRAVENOUS at 09:55

## 2022-02-23 RX ADMIN — DIVALPROEX SODIUM 1000 MG: 500 TABLET, EXTENDED RELEASE ORAL at 22:02

## 2022-02-23 RX ADMIN — ACETAMINOPHEN 1000 MG: 500 TABLET ORAL at 08:19

## 2022-02-23 RX ADMIN — PHENYLEPHRINE HYDROCHLORIDE 100 MCG: 10 INJECTION INTRAVENOUS at 09:58

## 2022-02-23 RX ADMIN — MORPHINE SULFATE 2 MG: 4 INJECTION INTRAVENOUS at 17:14

## 2022-02-23 RX ADMIN — SODIUM CHLORIDE, PRESERVATIVE FREE 10 ML: 5 INJECTION INTRAVENOUS at 16:22

## 2022-02-23 RX ADMIN — GLYCOPYRROLATE 0.6 MG: 0.2 INJECTION, SOLUTION INTRAMUSCULAR; INTRAVENOUS at 11:28

## 2022-02-23 RX ADMIN — DOCUSATE SODIUM 100 MG: 100 CAPSULE ORAL at 17:06

## 2022-02-23 RX ADMIN — OXYCODONE 10 MG: 5 TABLET ORAL at 19:02

## 2022-02-23 RX ADMIN — HYDROMORPHONE HYDROCHLORIDE 0.4 MG: 2 INJECTION INTRAMUSCULAR; INTRAVENOUS; SUBCUTANEOUS at 11:48

## 2022-02-23 RX ADMIN — Medication 4 MG: at 11:28

## 2022-02-23 RX ADMIN — ESMOLOL HYDROCHLORIDE 30 MG: 10 INJECTION, SOLUTION INTRAVENOUS at 10:57

## 2022-02-23 RX ADMIN — HYDROMORPHONE HYDROCHLORIDE 0.5 MG: 2 INJECTION, SOLUTION INTRAMUSCULAR; INTRAVENOUS; SUBCUTANEOUS at 13:23

## 2022-02-23 RX ADMIN — SODIUM CHLORIDE, SODIUM LACTATE, POTASSIUM CHLORIDE, AND CALCIUM CHLORIDE 75 ML/HR: 600; 310; 30; 20 INJECTION, SOLUTION INTRAVENOUS at 16:21

## 2022-02-23 RX ADMIN — ONDANSETRON 4 MG: 2 INJECTION INTRAMUSCULAR; INTRAVENOUS at 11:14

## 2022-02-23 RX ADMIN — CEFAZOLIN SODIUM 2 G: 100 INJECTION, POWDER, LYOPHILIZED, FOR SOLUTION INTRAVENOUS at 08:19

## 2022-02-23 RX ADMIN — SODIUM CHLORIDE, PRESERVATIVE FREE 10 ML: 5 INJECTION INTRAVENOUS at 22:03

## 2022-02-23 RX ADMIN — HYDROMORPHONE HYDROCHLORIDE 0.2 MG: 2 INJECTION INTRAMUSCULAR; INTRAVENOUS; SUBCUTANEOUS at 11:41

## 2022-02-23 RX ADMIN — KETAMINE HYDROCHLORIDE 30 MG: 50 INJECTION INTRAMUSCULAR; INTRAVENOUS at 09:55

## 2022-02-23 RX ADMIN — PHENYLEPHRINE HYDROCHLORIDE 100 MCG: 10 INJECTION INTRAVENOUS at 10:38

## 2022-02-23 RX ADMIN — Medication 10 MG: at 10:06

## 2022-02-23 RX ADMIN — SODIUM CHLORIDE, SODIUM LACTATE, POTASSIUM CHLORIDE, AND CALCIUM CHLORIDE 100 ML/HR: 600; 310; 30; 20 INJECTION, SOLUTION INTRAVENOUS at 08:20

## 2022-02-23 RX ADMIN — FENTANYL CITRATE 100 MCG: 50 INJECTION INTRAMUSCULAR; INTRAVENOUS at 09:55

## 2022-02-23 RX ADMIN — Medication 3 AMPULE: at 08:19

## 2022-02-23 RX ADMIN — KETAMINE HYDROCHLORIDE 10 MG: 50 INJECTION INTRAMUSCULAR; INTRAVENOUS at 10:53

## 2022-02-23 RX ADMIN — LIDOCAINE HYDROCHLORIDE 100 MG: 20 INJECTION, SOLUTION EPIDURAL; INFILTRATION; INTRACAUDAL; PERINEURAL at 09:55

## 2022-02-23 RX ADMIN — HYDROMORPHONE HYDROCHLORIDE 0.2 MG: 2 INJECTION INTRAMUSCULAR; INTRAVENOUS; SUBCUTANEOUS at 10:55

## 2022-02-23 RX ADMIN — CEFAZOLIN 2 G: 10 INJECTION, POWDER, FOR SOLUTION INTRAVENOUS at 16:21

## 2022-02-23 RX ADMIN — MORPHINE SULFATE 2 MG: 4 INJECTION INTRAVENOUS at 22:02

## 2022-02-23 RX ADMIN — ROCURONIUM BROMIDE 50 MG: 10 INJECTION, SOLUTION INTRAVENOUS at 09:56

## 2022-02-23 RX ADMIN — DEXAMETHASONE SODIUM PHOSPHATE 4 MG: 4 INJECTION, SOLUTION INTRAMUSCULAR; INTRAVENOUS at 10:02

## 2022-02-23 RX ADMIN — HYDROMORPHONE HYDROCHLORIDE 0.2 MG: 2 INJECTION INTRAMUSCULAR; INTRAVENOUS; SUBCUTANEOUS at 11:44

## 2022-02-23 NOTE — PROGRESS NOTES
02/23/22 1524   Dual Skin Pressure Injury Assessment   Dual Skin Pressure Injury Assessment WDL   Second Care Provider (Based on [address] [place] [address]) Yuki MONCADA

## 2022-02-23 NOTE — PROGRESS NOTES
PT Note: Patient has just transferred to floor from PACU. She is drowsy but restless and very shaky. Discussed with RN, does not appear appropriate for therapy assessment/mobility at this time.  Will re-attempt in AM.     JUN MorrisseyT

## 2022-02-23 NOTE — ANESTHESIA PREPROCEDURE EVALUATION
Relevant Problems   NEUROLOGY   (+) Bipolar 1 disorder, depressed, partial remission (HCC)   (+) GABRIEL (generalized anxiety disorder)      CARDIOVASCULAR   (+) Essential hypertension      GASTROINTESTINAL   (+) Duodenal ulcer       Anesthetic History               Review of Systems / Medical History  Patient summary reviewed and pertinent labs reviewed    Pulmonary          Smoker (vapor)         Neuro/Psych         Psychiatric history     Cardiovascular    Hypertension              Exercise tolerance: <4 METS: Limited by back pain, not cardiac symptoms  Comments: Bicuspid aortic valve ROHIT 2cm, TAAA 4.8 per patient   GI/Hepatic/Renal                Endo/Other             Other Findings              Physical Exam    Airway  Mallampati: I  TM Distance: 4 - 6 cm  Neck ROM: normal range of motion   Mouth opening: Normal     Cardiovascular    Rhythm: regular  Rate: normal    Murmur: Grade 1, Aortic area     Dental    Dentition: Edentulous     Pulmonary  Breath sounds clear to auscultation               Abdominal         Other Findings            Anesthetic Plan    ASA: 3  Anesthesia type: general          Induction: Intravenous  Anesthetic plan and risks discussed with: Patient and Spouse

## 2022-02-23 NOTE — OP NOTES
[address] [address]   [phone]    OPERATIVE REPORT    Patient ID:Brenda Leone  [id number]  1975  48 y.o. DATE OF SURGERY: 2/23/2022    SURGEON: Letha Yang MD    PREOP DIAGNOSIS:     1. Lumbar spondylolisthesis   2. Lumbar stenosis     POSTOP DIAGNOSIS:     1. Lumbar spondylolisthesis   2. Lumbar stenosis     PROCEDURE:  1. Posterolateral and transforaminal lumbar interbody fusion L5-S1 including laminectomy at both levels for stenosis (CPT 05551, 93461)  2. Insertion biomechanical device L5-S1 (CPT H514796)  3. Cortical screw instrumentation  L5 through S1 . (CPT H8568793)   4. Allograft (CPT 54000)     ANESTHESIA: General    ESTIMATED BLOOD LOSS:  150 ml    INTRAOPERATIVE COMPLICATIONS: None. POSTOP CONDITION: Stable. IMPLANTS:   Implant Name Type Inv. Item Serial No.  Lot No. LRB No. Used Action   SPACER SPNL 15 DEG SM [id number] MM STRL PROLIFT - [id number]  SPACER SPNL 15 DEG SM [id number] MM STRL PROLIFT  [employer] HOWM_WD [id number] N/A 1 Implanted   GRAFT BNE MED - [id number]  GRAFT BNE MED [id number] BIOLOGIC SYSTEM JANE  N/A 1 Implanted   SERVICE FEE 10ML BIO DBM PTTY W CHIP - [id number]  SERVICE FEE 10ML BIO DBM PTTY W CHIP  [employer]WD [id number] N/A 1 Implanted   ALLOGRAFT BNE CHIP 1-4 MM 5 CC CRUSH CANC - [id number]  ALLOGRAFT BNE CHIP 1-4 MM 5 CC CRUSH CANC [id number] [employer]_WD  N/A 1 Implanted   BLOCKER SPNL CT SERGIO 45 - [id number]  BLOCKER SPNL CT SERGIO 45  [employer] SPINE HOWM_WD [id number] N/A 4 Implanted       INDICATIONS FOR PROCEDURE: Patient has had low back pain with radiation to the buttocks and lower extremities for an extended period of time. The symptoms and exam findings were felt to be consistent with neurogenic claudication. The preoperative radiographs and other imaging confirmed showed spondylolisthesis and stenosis. Conservative measures have been exhausted as outlined in the H&P.    The symptoms progressed to the point where there is difficulty performing any task that requires prolonged standing or walking which interfered with activities of daily living and ability to enjoy life. In the outpatient setting the risks, benefits and potential complications of the above-listed procedure were discussed with her and an informed consent was obtained. DESCRIPTION OF PROCEDURE: After adequate induction of general anesthesia, the patient was positioned prone on the [facility] spinal table. Care was taken to pad all bony prominences. The shoulders and elbows were placed in the 90/90 position. The abdomen was allowed to hang free to decrease intraabdominal and venous pressure. The lumbar area was prepped and draped in the usual sterile fashion. Preoperative antibiotics were administered. A time out was called to confirm appropriate patient, proposed procedure and proposed incision site. With this confirmation, an incision was created in the midline of the back over the lumbar spinous processes. Dissection was carried down through the skin and subcutaneous tissues to the level of the lumbodorsal fascia. The lumbar dorsal fascia was released off of the spinous processes. The paraspinous musculature was elevated in a subperiosteal fashion in a lateral direction off of the lamina and over the the facet joints to be fused. A curet was slipped beneath the lamina and a cross table flouroscopic image was obtained to identify appropriate spinal level. The L5 through S1 transverse processes were exposed to their lateral tips. The sacral ala was exposed as well. All soft tissue was elevated off of the intertransverse membrane laterally in preparation for a fusion bed. With the posterior lateral dissection completed, attention was directed centrally where a Leksell rongeur was used to resect the spinous processes of L5 through S1. The 4 mm teddy was then used to thin the lamina to an egg shell like thickness.   A triple-0 angled curet was used to elevate the ligamentum flavum off of its origin on the caudal surface of the L5 lamina as well as off the cephalad surface of the S1 lamina. The ligamentum flavum was elevated from the thecal sac and a plane was created in the epidural space with a Lucinda elevator. A 4 mm Kerrison was used to perform a central laminectomy of S1 and this was carried cephalad through L5. The central laminectomy was widened to the medial border of the pedicle at each level. With the central laminectomy completed, a 4 mm Kerrison was used to undercut the lateral recess along the entire length of the laminectomy site. Then using the [facility] elevator to retract the thecal sac and identify each of the nerve roots, partial foraminotomies were performed and each nerve was visualized and decompressed bilaterally. The leny nerve root retractor was used to retrace the thecal sac overlying the L5 - S1  disc space. Care was taken to protect the exiting and descending nerve roots at all times. An annulotomy was then performed with a 15-blade. A complete discectomy was performed using a series of curets and rongeurs. The interbody sizing system was brought to the field and the sizing paddles were used sequentially to an appropriate fit. The endplates were prepared for fusion using the rasps. A trial interbody device was sized and inserted. Fluoroscopic images were obtained of the trial in both planes. The final interbody implant was the opened and packed with local autograft. Additional local bone graft was placed anteriorly in the interbody space. The biomechanical device was then impacted and rotated appropriately. Again fluoroscopy was ws used to confirm cage positioning. The 4 mm teddy was used to decorticate the previously exposed transverse processes and lateral aspect of the facet joints and pars intra-articularis. The sacral ala was decorticated as well.  The [place] spinal instrumentation system was brought to the field and using a free hand intersection technique, cortical screws were placed bilaterally from L5 to S1. The medial border of the pedicle was visualized through the spinal canal to confirm no medial or inferior breech. This was felt to be satisfactory. At this point the Protios soaked allograft was then packed into the lateral gutters beneath the screw heads, along the decorticated transverse processes and lateral facet joints for the posterolateral arthrodesis at L5-S1. Appropriately sized rods were then selected and bent into additional lordosis and laid into the pedicle screw heads. The set screws were then applied and tightened to the appropriate torque. C-arm fluoroscopy was brought in and used to obtain images to confirm appropriate hardware level and placement. This was felt to be satisfactory. With this, the wound was liberally irrigated and a hemovac drain was inserted through a separate incision in a subfascial plane. The lumbodorsal fascia was approximated with a # 1 Vicryl suture in an interrupted fashion. The subcutaneous tissue and skin were approximated in a layered fashion. Dermabond was applied. Sterile dressings were applied. The patient tolerated the procedure well and was returned to the postanesthesia care unit in stable condition. At the end of the case, all sponge, needle, and instrument counts were correct.       Natalie Centeno MD

## 2022-02-23 NOTE — ANESTHESIA POSTPROCEDURE EVALUATION
Procedure(s):  LUMBAR 5-SACRAL 1 LAMINECTOMY AND FUSION WITH ALLOGRAFT AND INSTRUMENTATION, TRANSFORAMINAL LUMBAR INTERBODY FUSION. general    Anesthesia Post Evaluation      Multimodal analgesia: multimodal analgesia used between 6 hours prior to anesthesia start to PACU discharge  Patient location during evaluation: PACU  Patient participation: complete - patient participated  Level of consciousness: responsive to verbal stimuli and responsive to physical stimuli (asleep)  Pain management: adequate  Airway patency: patent  Anesthetic complications: no  Cardiovascular status: acceptable  Respiratory status: acceptable  Hydration status: acceptable  Post anesthesia nausea and vomiting:  none      INITIAL Post-op Vital signs:   Vitals Value Taken Time   /67 02/23/22 1500   Temp 36.7 °C (98 °F) 02/23/22 1255   Pulse 113 02/23/22 1506   Resp 16 02/23/22 1420   SpO2 100 % 02/23/22 1506   Vitals shown include unvalidated device data.

## 2022-02-23 NOTE — PROGRESS NOTES
Patient states pain is a 7 out of 10, does not want pain medication right now.   Her  has brought her food and she wants to see if that will help with the pain right  now

## 2022-02-23 NOTE — PERIOP NOTES
TRANSFER - OUT REPORT:    Verbal report given to Taty on Adore Medina  being transferred to [id number] for routine progression of care       Report consisted of patients Situation, Background, Assessment and   Recommendations(SBAR). Information from the following report(s) SBAR, OR Summary, Procedure Summary, Intake/Output, MAR and Cardiac Rhythm sinus was reviewed with the receiving nurse. Lines:   Peripheral IV 02/23/22 Distal;Left;Posterior Forearm (Active)   Site Assessment Clean, dry, & intact 02/23/22 1255   Phlebitis Assessment 0 02/23/22 1255   Infiltration Assessment 0 02/23/22 1255   Dressing Status Clean, dry, & intact 02/23/22 1255   Dressing Type Transparent 02/23/22 1255   Hub Color/Line Status Patent 02/23/22 1255        Opportunity for questions and clarification was provided. Patient transported with:   O2 @ 2 liters  Tech    VTE prophylaxis orders have been written for Adore Medina. Patient and family given floor number and nurses name. Family updated re: pt status after security code verified.

## 2022-02-24 VITALS
DIASTOLIC BLOOD PRESSURE: 80 MMHG | HEART RATE: 88 BPM | WEIGHT: 148.8 LBS | OXYGEN SATURATION: 97 % | TEMPERATURE: 98.9 F | SYSTOLIC BLOOD PRESSURE: 130 MMHG | RESPIRATION RATE: 18 BRPM | BODY MASS INDEX: 23.35 KG/M2 | HEIGHT: 67 IN

## 2022-02-24 PROCEDURE — 74011250636 HC RX REV CODE- 250/636: Performed by: ORTHOPAEDIC SURGERY

## 2022-02-24 PROCEDURE — 97535 SELF CARE MNGMENT TRAINING: CPT

## 2022-02-24 PROCEDURE — 97530 THERAPEUTIC ACTIVITIES: CPT

## 2022-02-24 PROCEDURE — 2709999900 HC NON-CHARGEABLE SUPPLY

## 2022-02-24 PROCEDURE — 74011000250 HC RX REV CODE- 250: Performed by: ORTHOPAEDIC SURGERY

## 2022-02-24 PROCEDURE — 97161 PT EVAL LOW COMPLEX 20 MIN: CPT

## 2022-02-24 PROCEDURE — 74011250637 HC RX REV CODE- 250/637: Performed by: ORTHOPAEDIC SURGERY

## 2022-02-24 PROCEDURE — 97165 OT EVAL LOW COMPLEX 30 MIN: CPT

## 2022-02-24 RX ADMIN — ACETAMINOPHEN 650 MG: 325 TABLET ORAL at 12:49

## 2022-02-24 RX ADMIN — DOCUSATE SODIUM 100 MG: 100 CAPSULE ORAL at 07:56

## 2022-02-24 RX ADMIN — OXYCODONE 10 MG: 5 TABLET ORAL at 07:56

## 2022-02-24 RX ADMIN — GABAPENTIN 600 MG: 300 CAPSULE ORAL at 07:56

## 2022-02-24 RX ADMIN — MORPHINE SULFATE 2 MG: 4 INJECTION INTRAVENOUS at 03:50

## 2022-02-24 RX ADMIN — TOPIRAMATE 75 MG: 50 TABLET, FILM COATED ORAL at 07:56

## 2022-02-24 RX ADMIN — OXYCODONE 10 MG: 5 TABLET ORAL at 01:37

## 2022-02-24 RX ADMIN — LISINOPRIL 20 MG: 20 TABLET ORAL at 07:56

## 2022-02-24 RX ADMIN — TAMSULOSIN HYDROCHLORIDE 0.4 MG: 0.4 CAPSULE ORAL at 07:56

## 2022-02-24 RX ADMIN — CEFAZOLIN 2 G: 10 INJECTION, POWDER, FOR SOLUTION INTRAVENOUS at 05:55

## 2022-02-24 RX ADMIN — Medication 1 AMPULE: at 07:56

## 2022-02-24 RX ADMIN — ACETAMINOPHEN 650 MG: 325 TABLET ORAL at 05:55

## 2022-02-24 RX ADMIN — FAMOTIDINE 20 MG: 20 TABLET, FILM COATED ORAL at 07:56

## 2022-02-24 RX ADMIN — SODIUM CHLORIDE, PRESERVATIVE FREE 10 ML: 5 INJECTION INTRAVENOUS at 05:55

## 2022-02-24 NOTE — PROGRESS NOTES
ACUTE OT GOALS:  (Developed with and agreed upon by patient and/or caregiver.)  1. Patient will verbalize and demonstrate understanding of spinal precautions with 100% accuracy during ADLs. 2. Patient will complete lower body bathing and dressing with MOD I and adaptive equipment as needed. 3. Patient will complete functional transfers with MOD I and adaptive equipment as needed. 4. Patient will complete toileting and toilet transfer with MOD I.   5. Patient will complete functional mobility of household distances with MOD I and adaptive equipment as needed. 6. Patient will demonstrate ability to log roll in bed INDEPENDENTLY.     Timeframe: 7 visits   Spinal Precautions  OCCUPATIONAL THERAPY ASSESSMENT: Initial Assessment and Daily Note OT Treatment Day # 1    Clau Banda is a 48 y.o. female   PRIMARY DIAGNOSIS: Lumbar stenosis with neurogenic claudication  Lumbar stenosis with neurogenic claudication [M48.062]  Lumbar radiculopathy [M54.16]  Lumbar disc herniation [M51.26]  Status post lumbar spinal arthrodesis [Z98.1]  Procedure(s) (LRB):  LUMBAR 5-SACRAL 1 LAMINECTOMY AND FUSION WITH ALLOGRAFT AND INSTRUMENTATION, TRANSFORAMINAL LUMBAR INTERBODY FUSION (N/A)  1 Day Post-Op  Reason for Referral:    ICD-10: Treatment Diagnosis: Generalized Muscle Weakness (M62.81)  Difficulty in walking, Not elsewhere classified (R26.2)  Other abnormalities of gait and mobility (R26.89)  Low Back Pain (M54.5)  OUTPATIENT: [place] / [place] PART [place] B / Product Type: Medicare /   ASSESSMENT:     REHAB RECOMMENDATIONS:   Recommendation to date pending progress:  [address] [place] [address]  Equipment:    Rolling Walker    Recommended Shower Chair      PRIOR LEVEL OF FUNCTION:  (Prior to Hospitalization)  INITIAL/CURRENT LEVEL OF FUNCTION:  (Based on today's evaluation) Bathing:   Independent  Dressing:   Independent  Feeding/Grooming:   Independent  Toileting:   Independent  Functional Mobility:   Independent Bathing:   Contact Guard Assistance  Dressing:   Contact Guard Assistance  Feeding/Grooming:   Standby Assistance  Toileting:   Contact Guard Assistance  Functional Mobility:   Standby Assistance     ASSESSMENT:  Ms. He Arellano is a 49 y/o female presents with L5-S1 lami/fusion and is now on spinal precautions. Today pt presents with decreased activity tolerance, balance and mobility impacting ADLs. Pt educated on spinal precuations, log roll in/out of bed and adaptive technique for ADLs maintaining spinal precautions--pt and pt  verbalized understanding. Pt overall SBA RW for functional transfers and mobility of household distances. Pt practiced toilet transfer, toileting hygiene, grooming ADLs and simulating LE dressing in prep for d/c. Pt able to teach back spinal precautions. Pt is currently functioning below baseline and would benefit from skilled OT services to address OT goals and plan of care.  Rec HHOT at d/c.      SUBJECTIVE:   Ms. He Arellano states, \"I want to get rid of that purewick\"    SOCIAL HISTORY/LIVING ENVIRONMENT: lives with , split level home, 9-10 SHITAL, tub shower w/ grab bars, hx falls, no DME in home       OBJECTIVE:     PAIN: VITAL SIGNS: LINES/DRAINS:   Pre Treatment: Pain Screen  Pain Scale 1: Numeric (0 - 10)  Pain Intensity 1: 3  Pain Onset 1: post op  Pain Location 1: Back  Pain Orientation 1: Lower  Pain Description 1: Sore  Pain Intervention(s) 1: Ambulation/Increased Activity;Repositioned  Post Treatment: no c/o pain, resting comfortably in bedside chair   Hemovac  O2 Device: None (Room air)     GROSS EVALUATION:  BUE Within Functional Limits Abnormal/ Functional Abnormal/ Non-Functional (see comments) Not Tested Comments:   AROM [] [x] [] [] Spinal precautions   PROM [] [x] [] []    Strength [x] [] [] []    Balance [] [x] [] [] Sitting: good  Standing: fair+   Posture [x] [] [] []    Sensation [x] [] [] []    Coordination [x] [] [] []    Tone [x] [] [] []    Edema [x] [] [] []    Activity Tolerance [] [x] [] []     [] [] [] []      COGNITION/  PERCEPTION: Intact Impaired   (see comments) Comments:   Orientation [x] []    Vision [x] []    Hearing [x] []    Judgment/ Insight [x] []    Attention [x] []    Memory [x] []    Command Following [x] []    Emotional Regulation [x] []     [] []      ACTIVITIES OF DAILY LIVING: I Mod I S SBA CGA Min Mod Max Total NT Comments   BASIC ADLs:              Bathing/ Showering [] [] [] [] [] [] [] [] [] [x]    Toileting [] [] [] [] [x] [] [] [] [] [] For transfer onto toilet w/ RW and for shemar care   Dressing [] [] [] [] [x] [] [] [] [] [] Simulating LE dressing EOB   Feeding [] [] [] [] [] [] [] [] [] [x]    Grooming [] [] [] [x] [] [] [] [] [] [] Washing hands standing at sink    Personal Device Care [] [] [] [] [] [] [] [] [] [x]    Functional Mobility [] [] [] [x] [] [] [] [] [] [] RW   I=Independent, Mod I=Modified Independent, S=Supervision, SBA=Standby Assistance, CGA=Contact Guard Assistance,   Min=Minimal Assistance, Mod=Moderate Assistance, Max=Maximal Assistance, Total=Total Assistance, NT=Not Tested    MOBILITY: I Mod I S SBA CGA Min Mod Max Total  NT x2 Comments:   Supine to sit [] [] [] [] [] [x] [] [] [] [] [] Log roll   Sit to supine [] [] [] [] [] [] [] [] [] [x] []    Sit to stand [] [] [] [x] [] [] [] [] [] [] [] RW   Bed to chair [] [] [] [x] [] [] [] [] [] [] [] RW   I=Independent, Mod I=Modified Independent, S=Supervision, SBA=Standby Assistance, CGA=Contact Guard Assistance,   Min=Minimal Assistance, Mod=Moderate Assistance, Max=Maximal Assistance, Total=Total Assistance, NT=Not Tested    [employer] AM-[employer]AC 6 Clicks   Daily Activity Inpatient Short Form        How much help from another person does the patient currently need. .. Total A Lot A Little None   1.   Putting on and taking off regular lower body clothing? [] 1   [] 2   [x] 3   [] 4   2. Bathing (including washing, rinsing, drying)? [] 1   [] 2   [x] 3   [] 4   3. Toileting, which includes using toilet, bedpan or urinal?   [] 1   [] 2   [x] 3   [] 4   4. Putting on and taking off regular upper body clothing? [] 1   [] 2   [] 3   [x] 4   5. Taking care of personal grooming such as brushing teeth? [] 1   [] 2   [] 3   [x] 4   6. Eating meals? [] 1   [] 2   [] 3   [x] 4   © 2007, Trustees of [address] [place] [address] [address] [address], under license to [employer]. All rights reserved     Score:  Initial: 21 Most Recent: X (Date: -- )   Interpretation of Tool:  Represents activities that are increasingly more difficult (i.e. Bed mobility, Transfers, Gait). PLAN:   FREQUENCY/DURATION: OT Plan of Care: 3 times/week for duration of hospital stay or until stated goals are met, whichever comes first.    PROBLEM LIST:   (Skilled intervention is medically necessary to address:)  1. Decreased ADL/Functional Activities  2. Decreased Activity Tolerance  3. Decreased AROM/PROM  4. Decreased Balance  5. Decreased Gait Ability  6. Decreased Transfer Abilities  7. Increased Pain   INTERVENTIONS PLANNED:   (Benefits and precautions of occupational therapy have been discussed with the patient.)  1. Self Care Training  2. Therapeutic Activity  3. Therapeutic Exercise/HEP  4. Neuromuscular Re-education  5. Education     TREATMENT:     EVALUATION: Low Complexity : (Untimed Charge)    TREATMENT:   (     )  Self Care (23 Minutes): Self care including Toileting, Grooming, ADL Adaptive Equipment Training and functional transfers in prep for ADLs and ambulating household distances to increase independence and decrease level of assistance required.     TREATMENT GRID:  N/A    AFTER TREATMENT POSITION/PRECAUTIONS:  Chair, Needs within reach, RN notified and Visitors at bedside    INTERDISCIPLINARY COLLABORATION:  RN/PCT, PT/PTA and OT/PASCAL    TOTAL TREATMENT DURATION:  OT Patient Time In/Time Out  Time In: 1059  Time Out: [address], OT

## 2022-02-24 NOTE — DISCHARGE INSTRUCTIONS
Patient Education        Lumbar Laminectomy: What to Expect at Home  Your Recovery  A lumbar laminectomy is surgery to ease pressure on the spinal cord and nerves of the lower spine. The doctor took out pieces of bone that were squeezing the spinal cord and nerves. You can expect your back to feel stiff or sore after surgery. This should improve in the weeks after surgery. You may have trouble sitting or standing in one position for very long and may need pain medicine in the weeks after your surgery. Your doctor may advise you to work with a physical therapist to strengthen the muscles around your spine and trunk. You will need to learn how to lift, twist, and bend so that you don't put too much strain on your back. This care sheet gives you a general idea about how long it will take for you to recover. But each person recovers at a different pace. Follow the steps below to get better as quickly as possible. How can you care for yourself at home? Activity    · Rest when you feel tired. Getting enough sleep will help you recover.     · Try to walk each day. Start by walking a little more than you did the day before. Bit by bit, increase the amount you walk. Walking boosts blood flow and helps prevent pneumonia and constipation. Walking may also decrease your muscle soreness after surgery.     · If advised by your doctor, you may need to avoid lifting anything that would cause excessive strain on your back. This may include a child, heavy grocery bags and milk containers, a heavy briefcase or backpack, cat litter or dog food bags, or a vacuum .     · Avoid strenuous activities, such as bicycle riding, jogging, weight lifting, or aerobic exercise, until your doctor says it is okay.     · Do not drive for 2 to 4 weeks after your surgery or until your doctor says it is okay.     · Avoid riding in a car for more than 30 minutes at a time for 2 to 4 weeks after surgery.  If you must ride in a car for a longer distance, stop often to walk and stretch your legs.     · Try to change your position about every 30 minutes while sitting or standing. This will help decrease your back pain while you are healing.     · You will probably need to take 4 to 6 weeks off from work. It depends on the type of work you do and how you feel.     · You may have sex as soon as you feel able, but avoid positions that put stress on your back or cause pain. Diet    · You can eat your normal diet. If your stomach is upset, try bland, low-fat foods like plain rice, broiled chicken, toast, and yogurt.     · Drink plenty of fluids (unless your doctor tells you not to).     · You may notice that your bowel movements are not regular right after your surgery. This is common. Try to avoid constipation and straining with bowel movements. You may want to take a fiber supplement every day. If you have not had a bowel movement after a couple of days, ask your doctor about taking a mild laxative. Medicines    · Your doctor will tell you if and when you can restart your medicines. He or she will also give you instructions about taking any new medicines.     · If you take aspirin or some other blood thinner, ask your doctor if and when to start taking it again. Make sure that you understand exactly what your doctor wants you to do.     · Take pain medicines exactly as directed. ? If the doctor gave you a prescription medicine for pain, take it as prescribed. ? If you are not taking a prescription pain medicine, ask your doctor if you can take an over-the-counter medicine.     · If your doctor prescribed antibiotics, take them as directed. Do not stop taking them just because you feel better. You need to take the full course of antibiotics.     · If you think your pain medicine is making you sick to your stomach:  ? Take your medicine after meals (unless your doctor has told you not to). ? Ask your doctor for a different pain medicine.    Incision care    · If you have strips of tape on the cut (incision) the doctor made, leave the tape on for a week or until it falls off.     · Wash the area daily with warm, soapy water and pat it dry.     · Keep the area clean and dry. You may cover it with a gauze bandage if it weeps or rubs against clothing. Change the bandage every day. Exercise    · Do back exercises as instructed by your doctor.     · Your doctor may advise you to work with a physical therapist to improve the strength and flexibility of your back. Other instructions    · To reduce stiffness and help sore muscles, use a warm water bottle, a heating pad set on low, or a warm cloth on your back. Do not put heat right over the incision. Do not go to sleep with a heating pad on your skin. Follow-up care is a key part of your treatment and safety. Be sure to make and go to all appointments, and call your doctor if you are having problems. It's also a good idea to know your test results and keep a list of the medicines you take. When should you call for help? Call 911 anytime you think you may need emergency care. For example, call if:    · You passed out (lost consciousness).     · You have sudden chest pain and shortness of breath, or you cough up blood.     · You are unable to move a leg at all. Call your doctor now or seek immediate medical care if:    · You have new or worse symptoms in your legs or buttocks. Symptoms may include:  ? Numbness or tingling. ? Weakness. ? Pain.     · You lose bladder or bowel control.     · You have loose stitches, or your incision comes open.     · You have blood or fluid draining from the incision.     · You have signs of infection, such as:  ? Increased pain, swelling, warmth, or redness. ? Pus draining from the incision. ? A fever. ? Red streaks leading from the incision.    Watch closely for changes in your health, and be sure to contact your doctor if:    · You do not have a bowel movement after taking a laxative.     · You are not getting better as expected. Where can you learn more? Go to http://www.gray.com/  Enter T927 in the search box to learn more about \"Lumbar Laminectomy: What to Expect at Home. \"  Current as of: July 1, 2021               Content Version: 13.0  © 7643-0014 Healthwise, [employer]. Care instructions adapted under license by [employer] (which disclaims liability or warranty for this information). If you have questions about a medical condition or this instruction, always ask your healthcare professional. [place] [address] any warranty or liability for your use of this information.

## 2022-02-24 NOTE — PROGRESS NOTES
ORTH POST OP PROGRESS NOTE    2022  Admit Date: 2022  Admit Diagnosis: Lumbar stenosis with neurogenic claudication [M48.062]  Lumbar radiculopathy [M54.16]  Lumbar disc herniation [M51.26]  Status post lumbar spinal arthrodesis [Z98.1]  Procedure: Procedure(s):  LUMBAR 5-SACRAL 1 LAMINECTOMY AND FUSION WITH ALLOGRAFT AND INSTRUMENTATION, TRANSFORAMINAL LUMBAR INTERBODY FUSION  Post Op day: 1 Day Post-Op      Subjective:     Abdullahi Okeefe is a patient who has no complaints. Objective:     Vital Signs:    Blood pressure 116/77, pulse 100, temperature 98.7 °F (37.1 °C), resp. rate 17, height 5' 7\" (1.702 m), weight 148 lb 12.8 oz (67.5 kg), SpO2 100 %. Temp (24hrs), Av.7 °F (37.1 °C), Min:97.4 °F (36.3 °C), Max:100.2 °F (37.9 °C)      701 -  1900  In: 330 [P.O.:330]  Out: -    190 -  0700  In: 1500 [I.V.:1500]  Out: 850 [Urine:475; Drains:225]    LAB:    No results for input(s): HGB, WBC, PLT, HGBEXT, PLTEXT in the last 72 hours. Physical Exam    General:   Alert and oriented. No acute distress  Lungs:  Respirations unlabored. Extremities: No evidence of cyanosis. Calves soft, nontender. Moves both upper and lower extremities.    Dressing:  clean, dry, and intact  Neuro:  no deficit      Assessment:      Patient Active Problem List   Diagnosis Code    Essential hypertension I10    Bicuspid aortic valve Q23.1    Ascending aortic aneurysm (HCC) I71.2    Diarrhea R19.7    Non-intractable vomiting R11.10    Cough R05.9    Wheezing R06.2    Nasal congestion R09.81    Elevated LDL cholesterol level E78.00    Bipolar 1 disorder, depressed, partial remission ([facility] [address] F31.75    GABRIEL (generalized anxiety disorder) F41.1    Insomnia, psychophysiological F51.04    Anxiety F41.9    Duodenal ulcer K26.9    Gastritis without bleeding K29.70    History of diabetes mellitus, type II Z86.39    Lumbar stenosis with neurogenic claudication M48.062    Status post lumbar spinal arthrodesis Z98.1       Plan:     Continue PT/OT    Anticipate Discharge To: HOME after PT x 2 today     Signed By: Blake Dos Santos PA-C

## 2022-02-24 NOTE — PROGRESS NOTES
ACUTE PHYSICAL THERAPY GOALS:  (Developed with and agreed upon by patient and/or caregiver.)  1. Pt will perform all bed mobility (I) without cueing in 7 therapy sessions. 2. Pt will perform sit-to-stand/ stand-to-sit transfers under (S) without cueing in 7 therapy sessions. 3. Pt will ambulate 500 ft under (S) with use of LRAD and 1-2 breaks as needed in 7 therapy sessions. 4. Pt will perform standing balance activities with minimal postural sway in 7 therapy sessions. 5. Pt will tolerate multiple sets and reps of BLE exercises in 7 therapy sessions.       PHYSICAL THERAPY ASSESSMENT: Initial Assessment, Daily Note and AM PT Treatment Day # 1                POST-OP SPINAL PRECAUTIONS   Mandy Shrestha is a 48 y.o. female   PRIMARY DIAGNOSIS: Lumbar stenosis with neurogenic claudication  Lumbar stenosis with neurogenic claudication [M48.062]  Lumbar radiculopathy [M54.16]  Lumbar disc herniation [M51.26]  Status post lumbar spinal arthrodesis [Z98.1]  Procedure(s) (LRB):  LUMBAR 5-SACRAL 1 LAMINECTOMY AND FUSION WITH ALLOGRAFT AND INSTRUMENTATION, TRANSFORAMINAL LUMBAR INTERBODY FUSION (N/A)  1 Day Post-Op  Reason for Referral:    ICD-10: Treatment Diagnosis: Difficulty in walking, Not elsewhere classified (R26.2)  Low Back Pain (M54.5)  OUTPAT[place] / [place] / Product Type: Medicare /     ASSESSMENT:     REHAB RECOMMENDATIONS:   Recommendation to date pending progress:  [address] [place] [address]  Equipment:    Rolling Walker     PRIOR LEVEL OF FUNCTION:  (Prior to Hospitalization) INITIAL/CURRENT LEVEL OF FUNCTION:  (Most Recently Demonstrated)   Bed Mobility:   Independent  Sit to Stand:   Independent  Transfers:   Independent  Gait/Mobility:   Independent Bed Mobility:   Standby Assistance  Sit to Stand:  [facility] Assistance  Transfers:   Standby Assistance  Gait/Mobility:   Standby Assistance     ASSESSMENT:  Ms. Melvi Starkey Is a 48 y.o F presenting to PT POD 1 s/p L5-S1 Laminectomy & fusion c allograft, instrumentation and TLIF. PTA pt lives in a [address] [place] [address] home c ; 5 SHITAL. Pt reports she was functionally (I) for all needs at baseline without use of an AD; pt does endorse hx of falls. At time of initial evaluation, pt presents slightly below baseline LOF with deficits in standing dynamic balance and activity tolerance limiting her overall functional mobility. Today, pt performed all mobility with SB (A), cueing and inc time  while requiring additional use of RW for ambulation. Of note, pt was able to recite all spinal precautions and demonstrated good adherence to said precautions while performing all mobility. Pt did req inc time to perform sit-to-stand/ transfers however she ultimately did not require physical (A) from therapy staff. On her feet, pt ambulates c dec benita and inc postural sway; she also began with very narrow SISI but widened it to appropriate width following cues. Pt ultimately ambulated 20'x1 and 10'x1 c RW; she worked on toileting mechanics with OT in between trials showing good performance. At this time, pt is an appropriate candidate for skilled PT and will benefit from POC designed to address the aforementioned deficits. Upon completion of treatment, pt was positioned to comfort in chair with needs in reach and  at bedside. RN was made aware of pt performance. DC Recommendation: Home c HHPT     SUBJECTIVE:   Ms. Beti Madrigal states, \"I think I have 79 tattoos\"    SOCIAL HISTORY/LIVING ENVIRONMENT:  lives in a split-level home c ; 5 SHITAL.  Functionally (I) for all needs at baseline without use of an AD; pt does endorse hx of falls     OBJECTIVE:     PAIN: VITAL SIGNS: LINES/DRAINS:   Pre Treatment: Pain Screen  Pain Scale 1: Numeric (0 - 10)  Pain Intensity 1: 3  Pain Onset 1: post op  Pain Location 1: Back  Pain Orientation 1: Lower  Pain Description 1: Aching  Pain Intervention(s) 1: Ambulation/Increased Activity  Post Treatment: 3 Vital Signs  O2 Device: None (Room air) Hemovac  O2 Device: None (Room air)     GROSS EVALUATION:  BLE Within Functional Limits Abnormal/ Functional Abnormal/ Non-Functional (see comments) Not Tested Comments:   AROM [x] [] [] []    PROM [] [] [] []    Strength [x] [] [] []    Balance [] [x] [] [] Inc postural sway but no LOB   Posture [x] [] [] []    Sensation [] [] [] [x]    Coordination [x] [] [] []    Tone [x] [] [] []    Edema [x] [] [] []    Activity Tolerance [] [x] [] [] Below baseline level    [] [] [] []      COGNITION/  PERCEPTION: Intact Impaired   (see comments) Comments:   Orientation [x] []    Vision [x] []    Hearing [x] []    Command Following [x] []    Safety Awareness [x] [] Recited precautions x3    [] []      MOBILITY: I Mod I S SBA CGA Min Mod Max Total  NT x2 Comments:   Bed Mobility    Rolling [] [] [] [x] [] [] [] [] [] [] [] Log roll   Supine to Sit [] [] [] [x] [] [] [] [] [] [] []    Scooting [] [] [] [x] [] [] [] [] [] [] []    Sit to Supine [] [] [] [] [] [] [] [] [] [x] []    Transfers    Sit to Stand [] [] [] [x] [] [] [] [] [] [] []    Bed to Chair [] [] [] [x] [] [] [] [] [] [] []    Stand to Sit [] [] [] [x] [] [] [] [] [] [] []    I=Independent, Mod I=Modified Independent, S=Supervision, SBA=Standby Assistance, CGA=Contact Guard Assistance,   Min=Minimal Assistance, Mod=Moderate Assistance, Max=Maximal Assistance, Total=Total Assistance, NT=Not Tested  GAIT: I Mod I S SBA CGA Min Mod Max Total  NT x2 Comments:   Level of Assistance [] [] [] [x] [] [] [] [] [] [] []    Distance 20'x1; 10'x1    DME Rolling Walker    Gait Quality Dec benita and inc postural sway     Weightbearing Status N/A     I=Independent, Mod I=Modified Independent, S=Supervision, SBA=Standby Assistance, CGA=Contact Guard Assistance,   Min=Minimal Assistance, Mod=Moderate Assistance, Max=Maximal Assistance, Total=Total Assistance, NT=Not Tested    Kim Basic Mobility Inpatient Short Form       How much difficulty does the patient currently have. .. Unable A Lot A Little None   1. Turning over in bed (including adjusting bedclothes, sheets and blankets)? [] 1   [] 2   [] 3   [x] 4   2. Sitting down on and standing up from a chair with arms ( e.g., wheelchair, bedside commode, etc.)   [] 1   [] 2   [] 3   [x] 4   3. Moving from lying on back to sitting on the side of the bed? [] 1   [] 2   [] 3   [x] 4   How much help from another person does the patient currently need. .. Total A Lot A Little None   4. Moving to and from a bed to a chair (including a wheelchair)? [] 1   [] 2   [] 3   [x] 4   5. Need to walk in hospital room? [] 1   [] 2   [x] 3   [] 4   6. Climbing 3-5 steps with a railing? [] 1   [] 2   [x] 3   [] 4   © 2007, Trustees of [address] [place] [address], under license to [employer]. All rights reserved     Score:  Initial: 22 Most Recent: X (Date: -- )    Interpretation of Tool:  Represents activities that are increasingly more difficult (i.e. Bed mobility, Transfers, Gait). PLAN:   FREQUENCY/DURATION: PT Plan of Care: BID for duration of hospital stay or until stated goals are met, whichever comes first.    PROBLEM LIST:   (Skilled intervention is medically necessary to address:)  1. Decreased Activity Tolerance  2. Decreased Balance  3. Increased Pain   INTERVENTIONS PLANNED:   (Benefits and precautions of physical therapy have been discussed with the patient.)  1. Therapeutic Activity  2. Therapeutic Exercise/HEP  3. Neuromuscular Re-education  4. Gait Training  5. Manual Therapy  6. Education     TREATMENT:     EVALUATION: Low Complexity : (Untimed Charge)    TREATMENT:   (     )  Therapeutic Activity (23 Minutes): Therapeutic activity included Rolling, Supine to Sit, Scooting, Transfer Training, Ambulation on level ground, Sitting balance  and Standing balance to improve functional Mobility, Strength and Activity tolerance.     TREATMENT GRID:  N/A    AFTER TREATMENT POSITION/PRECAUTIONS:  Chair, Needs within reach, RN notified and Visitors at bedside    INTERDISCIPLINARY COLLABORATION:  RN/PCT, PT/PTA and OT/PASCAL    TOTAL TREATMENT DURATION:  PT Patient Time In/Time Out  Time In: 0977  Time Out: 1323 [facility],

## 2022-02-24 NOTE — PROGRESS NOTES
Chart screened by  for potential discharge needs or concerns. PT/OT evaluations complete with the recommendation for HH and DME ([facility]) at dc. SW met with pt/spouse to discuss these recommendations. Demographics, insurance and PCP confirmed. Pt is in agreement with [facility] services with no agency preference. Referral for [facility] PT[facility] [place] [facility] and their liaison was alerted to the referral.  RW & BSC supplied from [facility] delivered to pt's room. Consent signed and faxed to their office. No other dc needs or concerns identified or reported. Pt is medically cleared for dc to home today. Please notify/consult  if other discharge needs arise. Care Management Interventions  PCP Verified by CM: Yes  Last Visit to PCP: 02/11/22  Mode of Transport at Discharge:  Other (see comment) (spouse)  Transition of Care Consult (CM Consult): Home Health (NO CM CONSULT RECEIVED)  [address]: No  Reason Outside [facility]:  (patient preference)  Discharge Durable Medical Equipment: Yes (RW & BSC from [facility])  Physical Therapy Consult: Yes  Occupational Therapy Consult: Yes  Speech Therapy Consult: No  Support Systems: Spouse/Significant Other  Confirm Follow Up Transport: Family  The Plan for Transition of Care is Related to the Following Treatment Goals : Home health therapy to improve pt's strength and functional abilities s/p spine surgery  The Patient and/or Patient Representative was Provided with a Choice of Provider and Agrees with the Discharge Plan?: Yes  Freedom of Choice List was Provided with Basic Dialogue that Supports the Patient's Individualized Plan of Care/Goals, Treatment Preferences and Shares the Quality Data Associated with the Providers?: Yes  Discharge Location  Patient Expects to be Discharged to[de-identified] Home with home health ([facility] [place] [facility])

## 2022-02-24 NOTE — PROGRESS NOTES
ACUTE PHYSICAL THERAPY GOALS:  (Developed with and agreed upon by patient and/or caregiver.)  1. Pt will perform all bed mobility (I) without cueing in 7 therapy sessions. 2. Pt will perform sit-to-stand/ stand-to-sit transfers under (S) without cueing in 7 therapy sessions. 3. Pt will ambulate 500 ft under (S) with use of LRAD and 1-2 breaks as needed in 7 therapy sessions. 4. Pt will perform standing balance activities with minimal postural sway in 7 therapy sessions. 5. Pt will tolerate multiple sets and reps of BLE exercises in 7 therapy sessions. PHYSICAL THERAPY: Daily Note and PM Treatment Day # 1    Dottie Greenwood is a 48 y.o. female   PRIMARY DIAGNOSIS: Lumbar stenosis with neurogenic claudication  Lumbar stenosis with neurogenic claudication [M48.062]  Lumbar radiculopathy [M54.16]  Lumbar disc herniation [M51.26]  Status post lumbar spinal arthrodesis [Z98.1]  Procedure(s) (LRB):  LUMBAR 5-SACRAL 1 LAMINECTOMY AND FUSION WITH ALLOGRAFT AND INSTRUMENTATION, TRANSFORAMINAL LUMBAR INTERBODY FUSION (N/A)  1 Day Post-Op    ASSESSMENT:     REHAB RECOMMENDATIONS: CURRENT LEVEL OF FUNCTION:  (Most Recently Demonstrated)   Recommendation to date pending progress:  [address] [place] [address] Therapy  Equipment:    Obdulia Julio has in-room now Bed Mobility:   Not tested  Sit to Stand:  [employer] Assistance  Transfers:   Standby Assistance  Gait/Mobility:   Standby Assistance     ASSESSMENT:  Ms. Georgiana Mcardle was seated in chair on RA upon entering the room and agreeable to therapy. This afternoon, pt demonstrates good progress toward established goals as she was able to inc activity tolerance and do-so without inc level of (A) from therapist. Similar to AM session, pt required SB (A) for all mobility c additional use of RW for hallway ambulation.  Pt was able to ambulate 175'x1 in total. Her gati remains slow and somewhat shuffling in nature although she showed good AD negotiation and steadiness throughout all activity. Pt was able to recite all spinal precautions; additional cues provided again to inc stance width. Pt denying any complaints or concerns regarding her current mobility status. At this time, pt remains a good candidate for skilled PT and will continue to benefit from advancing POC. At end of session, pt was positioned to comfort in chair with all needs in reach. RN was made aware of pt performance.       SUBJECTIVE:   Ms. Mann Emerson states, \"I'm doing good\"    SOCIAL HISTORY/ LIVING ENVIRONMENT: See Eval  Support Systems: Spouse/Significant Other  OBJECTIVE:     PAIN: VITAL SIGNS: LINES/DRAINS:   Pre Treatment: Pain Screen  Pain Scale 1: Numeric (0 - 10)  Pain Intensity 1: 3  Pain Onset 1: post op  Pain Location 1: Back  Pain Orientation 1: Lower  Pain Description 1: Aching  Pain Intervention(s) 1: Ambulation/Increased Activity  Post Treatment: 3 Vital Signs  O2 Device: None (Room air) Hemovac  O2 Device: None (Room air)     MOBILITY: I Mod I S SBA CGA Min Mod Max Total  NT x2 Comments:   Bed Mobility    Rolling [] [] [] [] [] [] [] [] [] [x] []    Supine to Sit [] [] [] [] [] [] [] [] [] [x] []    Scooting [] [] [] [] [] [] [] [] [] [x] []    Sit to Supine [] [] [] [] [] [] [] [] [] [x] []    Transfers    Sit to Stand [] [] [] [x] [] [] [] [] [] [] []    Bed to Chair [] [] [] [] [] [] [] [] [] [x] []    Stand to Sit [] [] [] [x] [] [] [] [] [] [] []    I=Independent, Mod I=Modified Independent, S=Supervision, SBA=Standby Assistance, CGA=Contact Guard Assistance,   Min=Minimal Assistance, Mod=Moderate Assistance, Max=Maximal Assistance, Total=Total Assistance, NT=Not Tested    BALANCE: Good Fair+ Fair Fair- Poor NT Comments   Sitting Static [x] [] [] [] [] []    Sitting Dynamic [x] [] [] [] [] []              Standing Static [x] [] [] [] [] [] Within RW for both   Standing Dynamic [x] [] [] [] [] []      GAIT: I Mod I S SBA CGA Min Mod Max Total  NT x2 Comments:   Level of Assistance [] [] [] [x] [] [] [] [] [] [] []    Distance 175'x1    DME Rolling Walker    Gait Quality Slow, shuffling, narrow SISI, steady within RW    Weightbearing  Status N/A     I=Independent, Mod I=Modified Independent, S=Supervision, SBA=Standby Assistance, CGA=Contact Guard Assistance,   Min=Minimal Assistance, Mod=Moderate Assistance, Max=Maximal Assistance, Total=Total Assistance, NT=Not Tested    PLAN:   FREQUENCY/DURATION: PT Plan of Care: BID for duration of hospital stay or until stated goals are met, whichever comes first.  TREATMENT:     TREATMENT:   (     )  Therapeutic Activity (19 Minutes): Therapeutic activity included Scooting, Ambulation on level ground, Sitting balance  and Standing balance to improve functional Mobility, Strength and Activity tolerance.     TREATMENT GRID:  N/A    AFTER TREATMENT POSITION/PRECAUTIONS:  Chair, Needs within reach, RN notified and Visitors at bedside    INTERDISCIPLINARY COLLABORATION:  RN/PCT and PT/PTA    TOTAL TREATMENT DURATION:  PT Patient Time In/Time Out  Time In: 9372  Time Out: 1323 [facility], PT

## 2022-02-24 NOTE — PROGRESS NOTES
Spoke with NP on phone about patients drain output, emptied 100ml out of hemovac. She said to continue to monitor output and could still possibly D/C this afternoon.

## 2022-02-24 NOTE — PROGRESS NOTES
Reviewed discharge paperwork with patient and  in room. All appointments and medications addressed, post op care and activities reviewed. Patient discharged home.

## 2022-03-18 PROBLEM — M48.062 LUMBAR STENOSIS WITH NEUROGENIC CLAUDICATION: Status: ACTIVE | Noted: 2022-02-23

## 2022-03-18 PROBLEM — F41.1 GAD (GENERALIZED ANXIETY DISORDER): Status: ACTIVE | Noted: 2021-10-01

## 2022-03-18 PROBLEM — E78.00 ELEVATED LDL CHOLESTEROL LEVEL: Status: ACTIVE | Noted: 2021-08-20

## 2022-03-18 PROBLEM — F41.9 ANXIETY: Status: ACTIVE | Noted: 2021-11-19

## 2022-03-18 PROBLEM — R11.10 NON-INTRACTABLE VOMITING: Status: ACTIVE | Noted: 2021-08-03

## 2022-03-19 PROBLEM — Z98.1 STATUS POST LUMBAR SPINAL ARTHRODESIS: Status: ACTIVE | Noted: 2022-02-23

## 2022-03-19 PROBLEM — R19.7 DIARRHEA: Status: ACTIVE | Noted: 2021-08-03

## 2022-03-19 PROBLEM — K26.9 DUODENAL ULCER: Status: ACTIVE | Noted: 2022-01-30

## 2022-03-19 PROBLEM — F31.75 BIPOLAR 1 DISORDER, DEPRESSED, PARTIAL REMISSION (HCC): Status: ACTIVE | Noted: 2021-10-01

## 2022-03-19 PROBLEM — K29.70 GASTRITIS WITHOUT BLEEDING: Status: ACTIVE | Noted: 2022-01-30

## 2022-03-19 PROBLEM — R06.2 WHEEZING: Status: ACTIVE | Noted: 2021-08-03

## 2022-03-19 PROBLEM — R09.81 NASAL CONGESTION: Status: ACTIVE | Noted: 2021-08-03

## 2022-03-19 PROBLEM — Z86.39 HISTORY OF DIABETES MELLITUS, TYPE II: Status: ACTIVE | Noted: 2022-02-11

## 2022-03-19 PROBLEM — R05.9 COUGH: Status: ACTIVE | Noted: 2021-08-03

## 2022-03-20 PROBLEM — F51.04 INSOMNIA, PSYCHOPHYSIOLOGICAL: Status: ACTIVE | Noted: 2021-10-01

## 2022-06-09 ENCOUNTER — OFFICE VISIT (OUTPATIENT)
Dept: NEUROLOGY | Age: 47
End: 2022-06-09
Payer: MEDICARE

## 2022-06-09 VITALS
HEART RATE: 97 BPM | WEIGHT: 148 LBS | BODY MASS INDEX: 23.23 KG/M2 | DIASTOLIC BLOOD PRESSURE: 84 MMHG | HEIGHT: 67 IN | SYSTOLIC BLOOD PRESSURE: 127 MMHG

## 2022-06-09 DIAGNOSIS — G43.709 CHRONIC MIGRAINE W/O AURA W/O STATUS MIGRAINOSUS, NOT INTRACTABLE: Primary | ICD-10-CM

## 2022-06-09 PROCEDURE — G8428 CUR MEDS NOT DOCUMENT: HCPCS | Performed by: PSYCHIATRY & NEUROLOGY

## 2022-06-09 PROCEDURE — 4004F PT TOBACCO SCREEN RCVD TLK: CPT | Performed by: PSYCHIATRY & NEUROLOGY

## 2022-06-09 PROCEDURE — 64615 CHEMODENERV MUSC MIGRAINE: CPT | Performed by: PSYCHIATRY & NEUROLOGY

## 2022-06-09 PROCEDURE — 99213 OFFICE O/P EST LOW 20 MIN: CPT | Performed by: PSYCHIATRY & NEUROLOGY

## 2022-06-09 PROCEDURE — G8420 CALC BMI NORM PARAMETERS: HCPCS | Performed by: PSYCHIATRY & NEUROLOGY

## 2022-06-09 NOTE — PROGRESS NOTES
[facility]allanes   [address], [address] [facility]  Phone: [phone] Fax [phone]  Dr. Aruna Corbett        Patient: Ed Bath  Provider: Aruna Corbett MD     Procedure note: Botulinum Toxin injections     Indication: Chronic Migraine        Subjective:      Patient presents for chemodenervation for chronic migraine. This will be her second injection. She was last seen February 2022. Injection was met with benefit with reduced headache severity and frequency over the course of a little over 2 months. She has noticed some wearing off in the last couple of weeks before her next injection. No adverse effects. Headaches are described as diffuse and bitemporal head pain. Past medical history, surgical history, social history, family history, medications and allergies were all reviewed and updated as appropriate. Current Outpatient Medications on File Prior to Visit   Medication Sig Dispense Refill    clonazePAM (KLONOPIN) 1 MG tablet Take 1 mg by mouth 2 times daily as needed.  cyanocobalamin 100 MCG tablet Take 100 mcg by mouth daily      cyclobenzaprine (FLEXERIL) 5 MG tablet Take 5 mg by mouth 3 times daily as needed      divalproex (DEPAKOTE ER) 500 MG extended release tablet Take 1,000 mg by mouth      gabapentin (NEURONTIN) 300 MG capsule Take 300 mg by mouth 3 times daily.  lisinopril (PRINIVIL;ZESTRIL) 20 MG tablet Take 20 mg by mouth daily      naloxone 4 MG/0.1ML LIQD nasal spray Use 1 spray intranasally, then discard. Repeat with new spray every 2 min as needed for opioid overdose symptoms, alternating nostrils.       omeprazole (PRILOSEC) 40 MG delayed release capsule Take 40 mg by mouth daily      ondansetron (ZOFRAN-ODT) 8 MG TBDP disintegrating tablet Take 8 mg by mouth every 8 hours as needed      tamsulosin (FLOMAX) 0.4 MG capsule Take 0.4 mg by mouth daily      topiramate (TOPAMAX) 25 MG tablet Take 75 mg by mouth 2 times daily (with meals)      zolpidem (AMBIEN) 10 MG tablet Take 10 mg by mouth. No current facility-administered medications on file prior to visit. Exam:      Visit Vitals  Vitals:    06/09/22 1458   BP: 127/84   Pulse: 97     General Exam:  General - Well developed, well nourished, in no apparent distress. HENT - Normocephalic, atraumatic. Oropharynx clear. Eyes - Sclera anicteric. External Ears - Appear normal.   Neck - No visualized mass. Lungs - Breathing is non-labored. Skin - No significant lesions or discoloration noted. Musculoskeletal - Moves all extremities. No deformities noted. Psychiatric - Normal affect. No hallucinations. Neurological Exam:      MS/Language/Speech - Alert. Attentive and cooperative. Language fluent. Speech is clear. Cranial Nerves - Eye movements full. No nystagmus. No facial asymmetry. Tongue was midline. Shoulder shrug symmetric. Motor - Moves all extremities well with no apparent limitations. No tremor present. Cerebellar - No ataxia or dysmetria. Gait - Deferred. Assessment/Plan:      Seymour Culver is a 48 y. o.female who presents for chemodenervation for chronic migraine.     - Proceed with botulinum toxin injections as noted below. Change to dose and pattern as noted below.    - RTC 3 months for review and re-injection. Procedure:       Consent: Written consent obtained after the potential risks and benefits have been explained to the patient. Potential risks include:  Pain, bruising, bleeding, infection, flu-like symptoms, over-weakening of injected or adjacent muscles and swallowing dysfunction. Patient was given the botulinum toxin medication guide according to FDA standards. Technique: Botox A 200 unit was dissolved into non-preservative normal saline 4 ml. Gauge 30 facial needles were used for the injection. Procedure: Five units/ 0.1 ml per site unless specified. Occipitalis R3 L3.  Cervical paraspinal R2 L2. Trapezius R3 L3. Temporalis R4+1 L4+1. Frontalis R2+1 L2+1. Procerus 1. Corrugators R1 L1. Total injected: 175 units BOTOX 100units/2 cc. Waste: 25 units     Comments: There were no complications. The patient tolerated the procedure well. Signature: Rey Hazel MD  Date: 6/9/22  [facility]   [place] [address], [address]  Ph: [phone]  Fax: [phone]     I have personally interviewed and examined Mrs. Bren Owens and I have personally reviewed all relevant records including labs and imaging as noted above. I have written all aspects of this note. More than 50% of this time was used for counseling regarding my diagnosis, prognosis, and plans for management.    Procedure Time: 10 minutes  Total visit time (including procedures): 35 minutes

## 2022-07-28 ENCOUNTER — OFFICE VISIT (OUTPATIENT)
Dept: FAMILY MEDICINE CLINIC | Facility: CLINIC | Age: 47
End: 2022-07-28
Payer: MEDICARE

## 2022-07-28 VITALS
DIASTOLIC BLOOD PRESSURE: 80 MMHG | SYSTOLIC BLOOD PRESSURE: 120 MMHG | HEART RATE: 85 BPM | HEIGHT: 67 IN | OXYGEN SATURATION: 97 % | BODY MASS INDEX: 24.83 KG/M2 | TEMPERATURE: 98.4 F | WEIGHT: 158.2 LBS

## 2022-07-28 DIAGNOSIS — F51.04 INSOMNIA, PSYCHOPHYSIOLOGICAL: ICD-10-CM

## 2022-07-28 DIAGNOSIS — R53.83 OTHER FATIGUE: ICD-10-CM

## 2022-07-28 DIAGNOSIS — Q23.1 BICUSPID AORTIC VALVE: ICD-10-CM

## 2022-07-28 DIAGNOSIS — G25.81 RESTLESS LEGS SYNDROME (RLS): ICD-10-CM

## 2022-07-28 DIAGNOSIS — I10 ESSENTIAL HYPERTENSION: ICD-10-CM

## 2022-07-28 DIAGNOSIS — G89.29 CHRONIC MIDLINE LOW BACK PAIN WITHOUT SCIATICA: ICD-10-CM

## 2022-07-28 DIAGNOSIS — F17.210 SMOKING GREATER THAN 40 PACK YEARS: ICD-10-CM

## 2022-07-28 DIAGNOSIS — Z86.39 HISTORY OF DIABETES MELLITUS, TYPE II: ICD-10-CM

## 2022-07-28 DIAGNOSIS — M79.2 NEUROPATHIC PAIN: ICD-10-CM

## 2022-07-28 DIAGNOSIS — R33.9 URINARY RETENTION: ICD-10-CM

## 2022-07-28 DIAGNOSIS — D69.6 THROMBOCYTOPENIA (HCC): ICD-10-CM

## 2022-07-28 DIAGNOSIS — K76.0 FATTY LIVER: ICD-10-CM

## 2022-07-28 DIAGNOSIS — R71.8 ELEVATED MCV: Primary | ICD-10-CM

## 2022-07-28 DIAGNOSIS — R11.0 NAUSEA: ICD-10-CM

## 2022-07-28 DIAGNOSIS — M46.1 SACROILIITIS, NOT ELSEWHERE CLASSIFIED (HCC): ICD-10-CM

## 2022-07-28 DIAGNOSIS — F41.1 GAD (GENERALIZED ANXIETY DISORDER): ICD-10-CM

## 2022-07-28 DIAGNOSIS — I71.21 ASCENDING AORTIC ANEURYSM: ICD-10-CM

## 2022-07-28 DIAGNOSIS — R71.8 ELEVATED MCV: ICD-10-CM

## 2022-07-28 DIAGNOSIS — E11.51 TYPE 2 DIABETES MELLITUS WITH DIABETIC PERIPHERAL ANGIOPATHY WITHOUT GANGRENE, WITHOUT LONG-TERM CURRENT USE OF INSULIN (HCC): ICD-10-CM

## 2022-07-28 DIAGNOSIS — Z79.899 MEDICATION MANAGEMENT: ICD-10-CM

## 2022-07-28 DIAGNOSIS — K21.9 GASTROESOPHAGEAL REFLUX DISEASE WITHOUT ESOPHAGITIS: ICD-10-CM

## 2022-07-28 DIAGNOSIS — M54.50 CHRONIC MIDLINE LOW BACK PAIN WITHOUT SCIATICA: ICD-10-CM

## 2022-07-28 LAB
ALBUMIN SERPL-MCNC: 3.8 G/DL (ref 3.5–5)
ALBUMIN/GLOB SERPL: 1.2 {RATIO} (ref 1.2–3.5)
ALP SERPL-CCNC: 86 U/L (ref 50–136)
ALT SERPL-CCNC: 15 U/L (ref 12–65)
ANION GAP SERPL CALC-SCNC: 7 MMOL/L (ref 7–16)
AST SERPL-CCNC: 7 U/L (ref 15–37)
BILIRUB SERPL-MCNC: 0.3 MG/DL (ref 0.2–1.1)
BUN SERPL-MCNC: 19 MG/DL (ref 6–23)
CALCIUM SERPL-MCNC: 9.3 MG/DL (ref 8.3–10.4)
CHLORIDE SERPL-SCNC: 110 MMOL/L (ref 98–107)
CO2 SERPL-SCNC: 22 MMOL/L (ref 21–32)
CREAT SERPL-MCNC: 0.9 MG/DL (ref 0.6–1)
ERYTHROCYTE [DISTWIDTH] IN BLOOD BY AUTOMATED COUNT: 13.4 % (ref 11.9–14.6)
FOLATE SERPL-MCNC: 4.1 NG/ML (ref 3.1–17.5)
GLOBULIN SER CALC-MCNC: 3.3 G/DL (ref 2.3–3.5)
GLUCOSE SERPL-MCNC: 81 MG/DL (ref 65–100)
HBA1C MFR BLD: 5.1 %
HCT VFR BLD AUTO: 42 % (ref 35.8–46.3)
HGB BLD-MCNC: 14.2 G/DL (ref 11.7–15.4)
MAGNESIUM SERPL-MCNC: 2.2 MG/DL (ref 1.8–2.4)
MCH RBC QN AUTO: 34.2 PG (ref 26.1–32.9)
MCHC RBC AUTO-ENTMCNC: 33.8 G/DL (ref 31.4–35)
MCV RBC AUTO: 101.2 FL (ref 79.6–97.8)
NRBC # BLD: 0 K/UL (ref 0–0.2)
PLATELET # BLD AUTO: 127 K/UL (ref 150–450)
PMV BLD AUTO: 12.8 FL (ref 9.4–12.3)
POTASSIUM SERPL-SCNC: 3.9 MMOL/L (ref 3.5–5.1)
PROT SERPL-MCNC: 7.1 G/DL (ref 6.3–8.2)
RBC # BLD AUTO: 4.15 M/UL (ref 4.05–5.2)
SODIUM SERPL-SCNC: 139 MMOL/L (ref 136–145)
TSH W FREE THYROID IF ABNORMAL: 1.53 UIU/ML (ref 0.36–3.74)
VIT B12 SERPL-MCNC: 569 PG/ML (ref 193–986)
WBC # BLD AUTO: 5.4 K/UL (ref 4.3–11.1)

## 2022-07-28 PROCEDURE — 3044F HG A1C LEVEL LT 7.0%: CPT | Performed by: NURSE PRACTITIONER

## 2022-07-28 PROCEDURE — 2022F DILAT RTA XM EVC RTNOPTHY: CPT | Performed by: NURSE PRACTITIONER

## 2022-07-28 PROCEDURE — 99214 OFFICE O/P EST MOD 30 MIN: CPT | Performed by: NURSE PRACTITIONER

## 2022-07-28 PROCEDURE — G8420 CALC BMI NORM PARAMETERS: HCPCS | Performed by: NURSE PRACTITIONER

## 2022-07-28 PROCEDURE — 1036F TOBACCO NON-USER: CPT | Performed by: NURSE PRACTITIONER

## 2022-07-28 PROCEDURE — G8427 DOCREV CUR MEDS BY ELIG CLIN: HCPCS | Performed by: NURSE PRACTITIONER

## 2022-07-28 PROCEDURE — 83036 HEMOGLOBIN GLYCOSYLATED A1C: CPT | Performed by: NURSE PRACTITIONER

## 2022-07-28 RX ORDER — LISINOPRIL 20 MG/1
20 TABLET ORAL DAILY
Qty: 30 TABLET | Refills: 5 | Status: SHIPPED | OUTPATIENT
Start: 2022-07-28

## 2022-07-28 RX ORDER — GABAPENTIN 300 MG/1
600 CAPSULE ORAL 3 TIMES DAILY
Qty: 180 CAPSULE | Refills: 5 | Status: SHIPPED | OUTPATIENT
Start: 2022-07-28 | End: 2023-01-24

## 2022-07-28 RX ORDER — OMEPRAZOLE 40 MG/1
40 CAPSULE, DELAYED RELEASE ORAL DAILY
Qty: 30 CAPSULE | Refills: 5 | Status: SHIPPED | OUTPATIENT
Start: 2022-07-28

## 2022-07-28 RX ORDER — ONDANSETRON 4 MG/1
4 TABLET, ORALLY DISINTEGRATING ORAL EVERY 8 HOURS PRN
Qty: 30 TABLET | Refills: 0 | Status: SHIPPED | OUTPATIENT
Start: 2022-07-28

## 2022-07-28 RX ORDER — TAMSULOSIN HYDROCHLORIDE 0.4 MG/1
0.4 CAPSULE ORAL DAILY
Qty: 30 CAPSULE | Refills: 5 | Status: SHIPPED | OUTPATIENT
Start: 2022-07-28

## 2022-07-28 RX ORDER — ZOLPIDEM TARTRATE 10 MG/1
TABLET ORAL
COMMUNITY
Start: 2022-07-25 | End: 2022-08-01 | Stop reason: SDUPTHER

## 2022-07-28 SDOH — ECONOMIC STABILITY: FOOD INSECURITY: WITHIN THE PAST 12 MONTHS, YOU WORRIED THAT YOUR FOOD WOULD RUN OUT BEFORE YOU GOT MONEY TO BUY MORE.: NEVER TRUE

## 2022-07-28 SDOH — ECONOMIC STABILITY: TRANSPORTATION INSECURITY
IN THE PAST 12 MONTHS, HAS THE LACK OF TRANSPORTATION KEPT YOU FROM MEDICAL APPOINTMENTS OR FROM GETTING MEDICATIONS?: NO

## 2022-07-28 SDOH — ECONOMIC STABILITY: FOOD INSECURITY: WITHIN THE PAST 12 MONTHS, THE FOOD YOU BOUGHT JUST DIDN'T LAST AND YOU DIDN'T HAVE MONEY TO GET MORE.: NEVER TRUE

## 2022-07-28 SDOH — ECONOMIC STABILITY: TRANSPORTATION INSECURITY
IN THE PAST 12 MONTHS, HAS LACK OF TRANSPORTATION KEPT YOU FROM MEETINGS, WORK, OR FROM GETTING THINGS NEEDED FOR DAILY LIVING?: NO

## 2022-07-28 ASSESSMENT — COLUMBIA-SUICIDE SEVERITY RATING SCALE - C-SSRS
2. HAVE YOU ACTUALLY HAD ANY THOUGHTS OF KILLING YOURSELF?: NO
6. HAVE YOU EVER DONE ANYTHING, STARTED TO DO ANYTHING, OR PREPARED TO DO ANYTHING TO END YOUR LIFE?: NO
1. WITHIN THE PAST MONTH, HAVE YOU WISHED YOU WERE DEAD OR WISHED YOU COULD GO TO SLEEP AND NOT WAKE UP?: NO

## 2022-07-28 ASSESSMENT — LIFESTYLE VARIABLES: HOW OFTEN DO YOU HAVE A DRINK CONTAINING ALCOHOL: NEVER

## 2022-07-28 ASSESSMENT — PATIENT HEALTH QUESTIONNAIRE - PHQ9
SUM OF ALL RESPONSES TO PHQ QUESTIONS 1-9: 21
6. FEELING BAD ABOUT YOURSELF - OR THAT YOU ARE A FAILURE OR HAVE LET YOURSELF OR YOUR FAMILY DOWN: 3
SUM OF ALL RESPONSES TO PHQ QUESTIONS 1-9: 21
3. TROUBLE FALLING OR STAYING ASLEEP: 3
1. LITTLE INTEREST OR PLEASURE IN DOING THINGS: 3
10. IF YOU CHECKED OFF ANY PROBLEMS, HOW DIFFICULT HAVE THESE PROBLEMS MADE IT FOR YOU TO DO YOUR WORK, TAKE CARE OF THINGS AT HOME, OR GET ALONG WITH OTHER PEOPLE: 3
5. POOR APPETITE OR OVEREATING: 3
8. MOVING OR SPEAKING SO SLOWLY THAT OTHER PEOPLE COULD HAVE NOTICED. OR THE OPPOSITE, BEING SO FIGETY OR RESTLESS THAT YOU HAVE BEEN MOVING AROUND A LOT MORE THAN USUAL: 0
SUM OF ALL RESPONSES TO PHQ9 QUESTIONS 1 & 2: 6
7. TROUBLE CONCENTRATING ON THINGS, SUCH AS READING THE NEWSPAPER OR WATCHING TELEVISION: 3
SUM OF ALL RESPONSES TO PHQ QUESTIONS 1-9: 21
2. FEELING DOWN, DEPRESSED OR HOPELESS: 3
9. THOUGHTS THAT YOU WOULD BE BETTER OFF DEAD, OR OF HURTING YOURSELF: 0
4. FEELING TIRED OR HAVING LITTLE ENERGY: 3
SUM OF ALL RESPONSES TO PHQ QUESTIONS 1-9: 21

## 2022-07-28 ASSESSMENT — ENCOUNTER SYMPTOMS
SHORTNESS OF BREATH: 0
GASTROINTESTINAL NEGATIVE: 1
BACK PAIN: 1

## 2022-07-28 ASSESSMENT — SOCIAL DETERMINANTS OF HEALTH (SDOH): HOW HARD IS IT FOR YOU TO PAY FOR THE VERY BASICS LIKE FOOD, HOUSING, MEDICAL CARE, AND HEATING?: SOMEWHAT HARD

## 2022-07-28 NOTE — PROGRESS NOTES
Destiny Workman is a 48 y.o. female who presents today for the following:  Chief Complaint   Patient presents with    Follow-up     6 month fu       No Known Allergies    Current Outpatient Medications   Medication Sig Dispense Refill    lisinopril (PRINIVIL;ZESTRIL) 20 MG tablet Take 1 tablet by mouth in the morning. 30 tablet 5    omeprazole (PRILOSEC) 40 MG delayed release capsule Take 1 capsule by mouth in the morning. 30 capsule 5    ondansetron (ZOFRAN-ODT) 4 MG disintegrating tablet Take 1 tablet by mouth every 8 hours as needed for Nausea or Vomiting 30 tablet 0    zolpidem (AMBIEN) 10 MG tablet       tamsulosin (FLOMAX) 0.4 MG capsule Take 1 capsule by mouth in the morning. 30 capsule 5    gabapentin (NEURONTIN) 300 MG capsule Take 2 capsules by mouth in the morning and 2 capsules at noon and 2 capsules before bedtime. Do all this for 180 days. 180 capsule 5    topiramate (TOPAMAX) 25 MG tablet Take 75 mg by mouth 2 times daily (with meals)      clonazePAM (KLONOPIN) 1 MG tablet Take 1 mg by mouth 2 times daily as needed. cyanocobalamin 100 MCG tablet Take 100 mcg by mouth daily (Patient not taking: Reported on 7/28/2022)      divalproex (DEPAKOTE ER) 500 MG extended release tablet Take 1,000 mg by mouth      naloxone 4 MG/0.1ML LIQD nasal spray Use 1 spray intranasally, then discard. Repeat with new spray every 2 min as needed for opioid overdose symptoms, alternating nostrils. (Patient not taking: Reported on 7/28/2022)       No current facility-administered medications for this visit.        Past Medical History:   Diagnosis Date    Aneurysm ([address]     Aorta aneurysm measured 4.8/4.7cm on 10/06/2021, monitoring at present time    Bicuspid aortic valve     CAD (coronary artery disease)     Echo on 11/21/2018 showed  EF 50-55%    Chronic pain 3/14/2016    Cigarette smoker 11/22/2016    Controlled type 2 diabetes mellitus without complication, with long-term current use of insulin ([address] 2018    Fatty liver 2022    Hypercholesterolemia 2018    Hypertension     manged with med    Insomnia 3/14/2016    Obesity (BMI 30-39.9) 3/14/2016    Psychiatric disorder     anxiety , ADHD    Type 2 diabetes mellitus ([facility] 2022    Type 2 diabetes mellitus with peripheral vascular disease ([facility] 2019    no med at present time (2022) corrected with weight loss    Unspecified adverse effect of anesthesia     high anxiety after surgery, hiper       Past Surgical History:   Procedure Laterality Date    COLONOSCOPY N/A 2016    COLONOSCOPY  BMI 30 ROOM 603 performed by Francisco Romero MD at [facility] ENDOSCOPY    HYSTERECTOMY (CERVIX STATUS UNKNOWN)         Social History     Tobacco Use    Smoking status: Former     Packs/day: 0.50     Types: Cigarettes     Quit date: 2018     Years since quitting: 3.6    Smokeless tobacco: Never    Tobacco comments:     Quit smokin yrPPD   Substance Use Topics    Alcohol use: No     Alcohol/week: 0.0 standard drinks        Family History   Problem Relation Age of Onset    Other Mother         bipolar disorder    COPD Mother     High Cholesterol Mother     Diabetes Father     Hypertension Father     Diabetes Sister     Hypertension Sister     Cancer Father     Breast Cancer Neg Hx        HPI   Pt presents for follow up. She has hx of migraines followed by neurology, JUS, fatty liver, Bipolar 1, insomnia, diabetes Type 2, elevated ldl cholesterol, HTN, AAA, gastritis, thrombocytopenia (evaluated by hematology), urinary retention, duodenal ulcer, s/p lumbar TLIF Feb this year, and bicuspid aortic valve. Referred to pain management 22 to [facility]. She is followed by behavioral health/Ольга Lazcano NP, urology, ortho, and vascular. She is accompanied by her grandson. She recently had a death in the family and is having difficulty with her depression. She denies any suicidal or homicidal thoughts.   She has close follow-up Rate and Rhythm: Normal rate and regular rhythm. Pulses: Normal pulses. Heart sounds: Normal heart sounds. Pulmonary:      Effort: Pulmonary effort is normal.      Breath sounds: Normal breath sounds. Abdominal:      General: There is no distension. Musculoskeletal:         General: Normal range of motion. Cervical back: Normal range of motion and neck supple. Right lower leg: No edema. Left lower leg: No edema. Skin:     General: Skin is warm and dry. Coloration: Skin is not jaundiced or pale. Neurological:      General: No focal deficit present. Mental Status: She is alert and oriented to person, place, and time. Psychiatric:         Mood and Affect: Mood normal.         Behavior: Behavior normal.         Thought Content: Thought content normal.         Judgment: Judgment normal.        1. Elevated MCV  Assessment & Plan:   will check B12/folate, previously on B12 supplement but pt does not recall this  Orders:  -     Vitamin B12; Future  -     Folate; Future  2. Fatty liver  3. History of diabetes mellitus, type II  -     AMB POC HEMOGLOBIN A1C  4. Essential hypertension  Assessment & Plan:  Well controlled, continue current meds and plan of care. Orders:  -     CBC; Future  -     Comprehensive Metabolic Panel; Future  -     lisinopril (PRINIVIL;ZESTRIL) 20 MG tablet; Take 1 tablet by mouth in the morning., Disp-30 tablet, R-5Normal  -     Magnesium; Future  5. Medication management  -     CBC; Future  -     Comprehensive Metabolic Panel; Future  -     Magnesium; Future  6. Thrombocytopenia ([address]  -     CBC; Future  7. Restless legs syndrome (RLS)  8. Other fatigue  -     TSH with Reflex; Future  9. Urinary retention  Assessment & Plan:   previously evaluated by urology; stable on Flomax; encouraged to f/u with urology  Orders:  -     tamsulosin (FLOMAX) 0.4 MG capsule; Take 1 capsule by mouth in the morning., Disp-30 capsule, R-5Normal  10.  Nausea  - ondansetron (ZOFRAN-ODT) 4 MG disintegrating tablet; Take 1 tablet by mouth every 8 hours as needed for Nausea or Vomiting, Disp-30 tablet, R-0Normal  11. Gastroesophageal reflux disease without esophagitis  -     omeprazole (PRILOSEC) 40 MG delayed release capsule; Take 1 capsule by mouth in the morning., Disp-30 capsule, R-5Normal  12. Chronic midline low back pain without sciatica  -     gabapentin (NEURONTIN) 300 MG capsule; Take 2 capsules by mouth in the morning and 2 capsules at noon and 2 capsules before bedtime. Do all this for 180 days. , Disp-180 capsule, R-5Normal  13. Neuropathic pain  -     gabapentin (NEURONTIN) 300 MG capsule; Take 2 capsules by mouth in the morning and 2 capsules at noon and 2 capsules before bedtime. Do all this for 180 days. , Disp-180 capsule, R-5Normal  14. Bicuspid aortic valve  Assessment & Plan:     15. Sacroiliitis, not elsewhere classified ([facility]  16. Type 2 diabetes mellitus with diabetic peripheral angiopathy without gangrene, without long-term current use of insulin ([facility])  Assessment & Plan:   Well-controlled, lifestyle modifications recommended and she is no longer with diabetic or prediabetic A1c  17. Ascending aortic aneurysm [facility])  Assessment & Plan:   Monitored by specialist- no acute findings meriting change in the plan  18. Insomnia, psychophysiological  Assessment & Plan:     19. JUS (generalized anxiety disorder)  Assessment & Plan:     20. Smoking greater than 40 pack years  Assessment & Plan:   remains nonsmoker     A1c 5.1. Patient informed, we will call with blood work results within one week. If you have not heard regarding results in over a week, please contact office. You can also review results on [employer]. Follow-up and Dispositions    Return in about 6 months (around 1/28/2023).          Randy Park, APRN - CNP

## 2022-07-29 PROBLEM — M79.2 NEUROPATHIC PAIN: Status: ACTIVE | Noted: 2022-07-29

## 2022-07-29 PROBLEM — E11.51 TYPE 2 DIABETES MELLITUS WITH DIABETIC PERIPHERAL ANGIOPATHY WITHOUT GANGRENE, WITHOUT LONG-TERM CURRENT USE OF INSULIN (HCC): Status: ACTIVE | Noted: 2022-07-29

## 2022-07-29 PROBLEM — R71.8 ELEVATED MCV: Status: ACTIVE | Noted: 2022-07-29

## 2022-07-29 PROBLEM — G25.81 RESTLESS LEGS SYNDROME (RLS): Status: ACTIVE | Noted: 2022-07-29

## 2022-07-29 PROBLEM — D69.6 THROMBOCYTOPENIA (HCC): Status: ACTIVE | Noted: 2022-07-29

## 2022-07-29 PROBLEM — R33.9 URINARY RETENTION: Status: ACTIVE | Noted: 2022-07-29

## 2022-07-29 PROBLEM — M46.1 SACROILIITIS, NOT ELSEWHERE CLASSIFIED (HCC): Status: ACTIVE | Noted: 2022-07-29

## 2022-07-29 NOTE — ASSESSMENT & PLAN NOTE
Well-controlled, lifestyle modifications recommended and she is no longer with diabetic or prediabetic A1c

## 2022-07-29 NOTE — RESULT ENCOUNTER NOTE
Labs look stable. Please let her know I recommend she follow up with urology to complete her work up for urinary retention. She was seen September 2021 and was recommended to follow up in January 2022. Please provide her the number to call to schedule if she does not have it. Thanks.

## 2022-08-01 ENCOUNTER — TELEMEDICINE (OUTPATIENT)
Dept: BEHAVIORAL/MENTAL HEALTH CLINIC | Age: 47
End: 2022-08-01
Payer: MEDICARE

## 2022-08-01 DIAGNOSIS — F51.04 INSOMNIA, PSYCHOPHYSIOLOGICAL: ICD-10-CM

## 2022-08-01 DIAGNOSIS — F31.75 BIPOLAR 1 DISORDER, DEPRESSED, PARTIAL REMISSION (HCC): Primary | ICD-10-CM

## 2022-08-01 DIAGNOSIS — F41.1 GAD (GENERALIZED ANXIETY DISORDER): ICD-10-CM

## 2022-08-01 PROCEDURE — G8420 CALC BMI NORM PARAMETERS: HCPCS | Performed by: NURSE PRACTITIONER

## 2022-08-01 PROCEDURE — 1036F TOBACCO NON-USER: CPT | Performed by: NURSE PRACTITIONER

## 2022-08-01 PROCEDURE — 99215 OFFICE O/P EST HI 40 MIN: CPT | Performed by: NURSE PRACTITIONER

## 2022-08-01 PROCEDURE — G8427 DOCREV CUR MEDS BY ELIG CLIN: HCPCS | Performed by: NURSE PRACTITIONER

## 2022-08-01 RX ORDER — ZOLPIDEM TARTRATE 10 MG/1
10 TABLET ORAL NIGHTLY PRN
Qty: 30 TABLET | Refills: 2 | Status: SHIPPED | OUTPATIENT
Start: 2022-08-24 | End: 2022-10-28

## 2022-08-01 RX ORDER — CLONAZEPAM 1 MG/1
1 TABLET ORAL 2 TIMES DAILY PRN
Qty: 60 TABLET | Refills: 2 | Status: SHIPPED | OUTPATIENT
Start: 2022-08-24 | End: 2022-10-28

## 2022-08-01 NOTE — PROGRESS NOTES
OUTPATIENT PSYCHIATRIC RETURN VISIT PROGRESS NOTE    I was in the office While conducting this encounter. P was at home. She and/ or her healthcare decision maker is aware that this patient-initialed Telehealth encounter is a billable service with coverage as determined by her insurance carrier. She is aware that she may receive a bill and has provided verbal consent to proceed: Yes    The Virtual visit was conducted via [employer]. Pursuant to the emergency declaration under the [facility], On license of [facility] waiver authority and the [employer] Act, this Virtual  Visit was conducted to reduce the patient's risk of exposure to COVID-19 and provide continuity of care for an established patient. Services were provided through a video synchronous discussion virtually to substitute for in-person clinic visit. Due to this being a TeleHealth evaluation, many elements of the physical examination are unable to be assessed. Total Time:  minutes: 40-54 minutes         Date of Service: 8/1/2022     Identification    Gabriela Baker is a 48 y.o.  with a past psychiatric history of bipolar d/o, insomnia, JUS  who presents today for a psychiatric follow up appointment. CC:  Routine medication management follow up. No chief complaint on file. Subjective / Interval History:    Pt visit via [employer]  today and reports that her depression began to worsen mid June, worsened in July when her best friend's son was shot and killed. Constantly crying, isolating, feels hopeless,sleeping but having \"bad dreams that wake me up crying\". Pt says her Depakote no longer seems effective as previously. Prn Klonopin effective for anxiety. Denies SI/HI. Pt has been medication compliant and denies any side-effects. Pt denies SI, HI and AVH. Does not endorse any manic or psychotic symptoms.     Psychiatric Review Of Systems:  Sleep: difficulty falling asleep  Appetite: ok  Current suicidal/homicidal ideations: Denies SI/ HI  Current auditory/visual hallucinations: Denies     Medications:    Current Outpatient Medications:     lisinopril (PRINIVIL;ZESTRIL) 20 MG tablet, Take 1 tablet by mouth in the morning., Disp: 30 tablet, Rfl: 5    omeprazole (PRILOSEC) 40 MG delayed release capsule, Take 1 capsule by mouth in the morning., Disp: 30 capsule, Rfl: 5    ondansetron (ZOFRAN-ODT) 4 MG disintegrating tablet, Take 1 tablet by mouth every 8 hours as needed for Nausea or Vomiting, Disp: 30 tablet, Rfl: 0    zolpidem (AMBIEN) 10 MG tablet, , Disp: , Rfl:     tamsulosin (FLOMAX) 0.4 MG capsule, Take 1 capsule by mouth in the morning., Disp: 30 capsule, Rfl: 5    gabapentin (NEURONTIN) 300 MG capsule, Take 2 capsules by mouth in the morning and 2 capsules at noon and 2 capsules before bedtime. Do all this for 180 days. , Disp: 180 capsule, Rfl: 5    clonazePAM (KLONOPIN) 1 MG tablet, Take 1 mg by mouth 2 times daily as needed. , Disp: , Rfl:     cyanocobalamin 100 MCG tablet, Take 100 mcg by mouth daily (Patient not taking: Reported on 7/28/2022), Disp: , Rfl:     divalproex (DEPAKOTE ER) 500 MG extended release tablet, Take 1,000 mg by mouth, Disp: , Rfl:     naloxone 4 MG/0.1ML LIQD nasal spray, Use 1 spray intranasally, then discard. Repeat with new spray every 2 min as needed for opioid overdose symptoms, alternating nostrils.  (Patient not taking: Reported on 7/28/2022), Disp: , Rfl:     topiramate (TOPAMAX) 25 MG tablet, Take 75 mg by mouth 2 times daily (with meals), Disp: , Rfl:        PMH:  Past Medical History:   Diagnosis Date    Aneurysm ([address]     Aorta aneurysm measured 4.8/4.7cm on 10/06/2021, monitoring at present time    Bicuspid aortic valve     CAD (coronary artery disease)     Echo on 11/21/2018 showed  EF 50-55%    Chronic pain 3/14/2016    Cigarette smoker 11/22/2016    Controlled type 2 diabetes mellitus without complication, with long-term current use of insulin ([facility] 11/12/2018    Fatty liver 7/28/2022    Hypercholesterolemia 11/12/2018    Hypertension     manged with med    Insomnia 3/14/2016    Obesity (BMI 30-39.9) 3/14/2016    Psychiatric disorder     anxiety , ADHD    Type 2 diabetes mellitus ([facility] 1/28/2022    Type 2 diabetes mellitus with peripheral vascular disease ([facility] 11/4/2019    no med at present time (02/2022) corrected with weight loss    Unspecified adverse effect of anesthesia     high anxiety after surgery, hiper       Vitals: There were no vitals filed for this visit. ROS:  Psychological ROS: positive for depression, loss of interest in favorite activities, and sleep disturbance    Mental Status Exam:   Appearance  Appears older, Disheveled, Appropriately attired, Well-nourished, Cooperative, and Maintains eye-contact  Behavior  Anxious and Cooperative  Psychomotor Activity within normal limits  Gait and Station Normal Laina and Station and Normal Balance  Speech   appropriate and spontaneous  Mood    is depressed  Affect    depressed, flat, and tearful  Thought Process Circumstantial  Thought Content/Perceptual Disturbances free of delusions, free of hallucinations, and not internally preoccupied  Cognition/Sensorium Alert, Fully oriented, Normal concentration/ Attention, Recent memory intact, Remote memory intact, and Fund of knowledge adequate for level of education  Insight  fair  Judgment fair  Assessment:    Diagnoses and all orders for this visit:    Bipolar 1 disorder, depressed, partial remission (HCC)  -     Valproic Acid Level, Total; Future    Insomnia, psychophysiological  -     zolpidem (AMBIEN) 10 MG tablet; Take 1 tablet by mouth nightly as needed for Sleep for up to 30 days. JUS (generalized anxiety disorder)  -     clonazePAM (KLONOPIN) 1 MG tablet; Take 1 tablet by mouth 2 times daily as needed for Anxiety for up to 30 days.        Patient Education and Counseling:  Supportive therapy provided for identified

## 2022-08-02 ENCOUNTER — NURSE ONLY (OUTPATIENT)
Dept: FAMILY MEDICINE CLINIC | Facility: CLINIC | Age: 47
End: 2022-08-02

## 2022-08-02 DIAGNOSIS — F31.75 BIPOLAR 1 DISORDER, DEPRESSED, PARTIAL REMISSION (HCC): ICD-10-CM

## 2022-08-02 LAB — VALPROATE SERPL-MCNC: 61 UG/ML (ref 50–100)

## 2022-08-03 ENCOUNTER — TELEPHONE (OUTPATIENT)
Dept: FAMILY MEDICINE CLINIC | Facility: CLINIC | Age: 47
End: 2022-08-03

## 2022-08-04 ENCOUNTER — OFFICE VISIT (OUTPATIENT)
Dept: ORTHOPEDIC SURGERY | Age: 47
End: 2022-08-04

## 2022-08-04 DIAGNOSIS — Z98.1 STATUS POST LUMBAR SPINAL ARTHRODESIS: Primary | ICD-10-CM

## 2022-08-04 DIAGNOSIS — M96.1 FAILED BACK SYNDROME: ICD-10-CM

## 2022-08-04 DIAGNOSIS — F31.63 BIPOLAR DISORDER, CURRENT EPISODE MIXED, SEVERE, WITHOUT PSYCHOTIC FEATURES (HCC): Primary | ICD-10-CM

## 2022-08-04 PROCEDURE — G8420 CALC BMI NORM PARAMETERS: HCPCS | Performed by: ORTHOPAEDIC SURGERY

## 2022-08-04 PROCEDURE — 99213 OFFICE O/P EST LOW 20 MIN: CPT | Performed by: ORTHOPAEDIC SURGERY

## 2022-08-04 PROCEDURE — 1036F TOBACCO NON-USER: CPT | Performed by: ORTHOPAEDIC SURGERY

## 2022-08-04 PROCEDURE — G8427 DOCREV CUR MEDS BY ELIG CLIN: HCPCS | Performed by: ORTHOPAEDIC SURGERY

## 2022-08-04 RX ORDER — DIVALPROEX SODIUM 500 MG/1
1000 TABLET, EXTENDED RELEASE ORAL NIGHTLY
Qty: 30 TABLET | Refills: 3 | Status: SHIPPED | OUTPATIENT
Start: 2022-08-04 | End: 2022-10-28

## 2022-08-04 RX ORDER — DIVALPROEX SODIUM 250 MG/1
250 TABLET, EXTENDED RELEASE ORAL NIGHTLY
Qty: 30 TABLET | Refills: 3 | Status: SHIPPED | OUTPATIENT
Start: 2022-08-04 | End: 2022-10-28 | Stop reason: SDUPTHER

## 2022-08-04 NOTE — PROGRESS NOTES
Name: Sherry Epstein  YOB: 1975  Gender: female  MRN: [id number]  Age: 48 y.o. Chief Complaint: Lumbar spine surgery follow up    History of Present Illness:      Sherry Epstein  is here for 6month follow up of her  lumbar TLIF surgery. Despite having relief of her leg weakness and no additional falls postoperative, she continues to complain of significant and intractable back pain. The back pain is precluding her from considering returning to work. She has been put on a regimen of gabapentin. She is not really interested in pain management. Medications:       Current Outpatient Medications:     [START ON 8/24/2022] clonazePAM (KLONOPIN) 1 MG tablet, Take 1 tablet by mouth 2 times daily as needed for Anxiety for up to 30 days. , Disp: 60 tablet, Rfl: 2    [START ON 8/24/2022] zolpidem (AMBIEN) 10 MG tablet, Take 1 tablet by mouth nightly as needed for Sleep for up to 30 days. , Disp: 30 tablet, Rfl: 2    lisinopril (PRINIVIL;ZESTRIL) 20 MG tablet, Take 1 tablet by mouth in the morning., Disp: 30 tablet, Rfl: 5    omeprazole (PRILOSEC) 40 MG delayed release capsule, Take 1 capsule by mouth in the morning., Disp: 30 capsule, Rfl: 5    ondansetron (ZOFRAN-ODT) 4 MG disintegrating tablet, Take 1 tablet by mouth every 8 hours as needed for Nausea or Vomiting, Disp: 30 tablet, Rfl: 0    tamsulosin (FLOMAX) 0.4 MG capsule, Take 1 capsule by mouth in the morning., Disp: 30 capsule, Rfl: 5    gabapentin (NEURONTIN) 300 MG capsule, Take 2 capsules by mouth in the morning and 2 capsules at noon and 2 capsules before bedtime. Do all this for 180 days. , Disp: 180 capsule, Rfl: 5    cyanocobalamin 100 MCG tablet, Take 100 mcg by mouth daily (Patient not taking: Reported on 7/28/2022), Disp: , Rfl:     divalproex (DEPAKOTE ER) 500 MG extended release tablet, Take 1,000 mg by mouth, Disp: , Rfl:     naloxone 4 MG/0.1ML LIQD nasal spray, Use 1 spray intranasally, then discard. Repeat with new spray every 2 min as needed for opioid overdose symptoms, alternating nostrils. (Patient not taking: Reported on 7/28/2022), Disp: , Rfl:     topiramate (TOPAMAX) 25 MG tablet, Take 75 mg by mouth 2 times daily (with meals), Disp: , Rfl:     Allergies:  No Known Allergies      Physical Exam:      Respirations are unlabored and there is no evidence of cyanosis      Wound is healed nicely without erythema, drainage or underlying fluctuance    Gait is normal    Sensory testing reveals intact sensation to light touch and in the distribution of the L3-S1 dermatomes bilaterally. Strength testing in the lower extremity reveals the following based on the 5 point grading scale:       HF (L2) H Ab (L5) KE (L3/4) ADF (L4) EHL (L5) A Ev (S1) APF (S1)   Right 5 5 5 5 5 5 5   Left 5 5 5 5 5 5 5        Radiographic Studies:     X-rays including AP and lateral views of the lumbar spine were reviewed and interpreted:     Postoperative changes are noted status post lumbar fusion with instrumentation. The hardware appears to be well-placed and intact. There is no evidence of significant osteolysis. No significant adjacent level decompensation. Alignment is maintained. Radiographic Impression:    Appropriate postoperative changes status post lumbar laminectomy and fusion without evidence for hardware failure or decompensation. Diagnosis:      ICD-10-CM    1. Status post lumbar spinal arthrodesis  Z98.1 XR LUMBAR SPINE (2-3 VIEWS)      2. Failed back syndrome  M96.1           Assessment/Plan:      The patient has significant back pain now 6-month status post lumbar fusion surgery. There is nothing sinister on her radiographs or neurologic exam.  Her fusion does appear to be well-healed. There is no hardware aberrancy or loosening. I do not think she would benefit from any additional surgery. I do think she might benefit from pain management.   However, the patient is fearful of going to pain management

## 2022-09-01 ENCOUNTER — OFFICE VISIT (OUTPATIENT)
Dept: NEUROLOGY | Age: 47
End: 2022-09-01
Payer: MEDICARE

## 2022-09-01 VITALS
HEART RATE: 82 BPM | DIASTOLIC BLOOD PRESSURE: 79 MMHG | WEIGHT: 158 LBS | SYSTOLIC BLOOD PRESSURE: 124 MMHG | HEIGHT: 67 IN | BODY MASS INDEX: 24.8 KG/M2

## 2022-09-01 DIAGNOSIS — G43.709 CHRONIC MIGRAINE W/O AURA W/O STATUS MIGRAINOSUS, NOT INTRACTABLE: Primary | ICD-10-CM

## 2022-09-01 PROCEDURE — 64615 CHEMODENERV MUSC MIGRAINE: CPT | Performed by: PSYCHIATRY & NEUROLOGY

## 2022-09-01 NOTE — PROGRESS NOTES
Naga Tomlin  [address] [facility]  Phone: [phone] Fax [phone]  Dr. Olga Moffett        Patient: Kristian Ordaz  Provider: Olga Moffett MD     Procedure note: Botulinum Toxin injections     Indication: Chronic Migraine        Subjective:      Patient presents for chemodenervation for chronic migraine. She was last seen June 2022. Injection was met with benefit with reduced headache severity and frequency. The increased dose was very beneficial for her. She has not noticed much in the way of any wearing off before the next injection. No adverse effects. Headaches are described as diffuse and bitemporal head pain. Past medical history, surgical history, social history, family history, medications and allergies were all reviewed and updated as appropriate. Current Outpatient Medications on File Prior to Visit   Medication Sig Dispense Refill    divalproex (DEPAKOTE ER) 500 MG extended release tablet Take 2 tablets by mouth at bedtime 30 tablet 3    divalproex (DEPAKOTE ER) 250 MG extended release tablet Take 1 tablet by mouth at bedtime Take with the two 500 mg tablets to equal 1250 mg Q HS. 30 tablet 3    clonazePAM (KLONOPIN) 1 MG tablet Take 1 tablet by mouth 2 times daily as needed for Anxiety for up to 30 days. 60 tablet 2    zolpidem (AMBIEN) 10 MG tablet Take 1 tablet by mouth nightly as needed for Sleep for up to 30 days. 30 tablet 2    lisinopril (PRINIVIL;ZESTRIL) 20 MG tablet Take 1 tablet by mouth in the morning. 30 tablet 5    omeprazole (PRILOSEC) 40 MG delayed release capsule Take 1 capsule by mouth in the morning. 30 capsule 5    ondansetron (ZOFRAN-ODT) 4 MG disintegrating tablet Take 1 tablet by mouth every 8 hours as needed for Nausea or Vomiting 30 tablet 0    tamsulosin (FLOMAX) 0.4 MG capsule Take 1 capsule by mouth in the morning.  30 capsule 5    gabapentin (NEURONTIN) 300 MG capsule Take 2 capsules by mouth in the morning and 2 capsules at noon and 2 capsules before bedtime. Do all this for 180 days. 180 capsule 5    cyanocobalamin 100 MCG tablet Take 100 mcg by mouth daily (Patient not taking: Reported on 7/28/2022)      naloxone 4 MG/0.1ML LIQD nasal spray Use 1 spray intranasally, then discard. Repeat with new spray every 2 min as needed for opioid overdose symptoms, alternating nostrils. (Patient not taking: Reported on 7/28/2022)      topiramate (TOPAMAX) 25 MG tablet Take 75 mg by mouth 2 times daily (with meals)       No current facility-administered medications on file prior to visit. Exam:      Visit Vitals  Vitals:    09/01/22 1351   BP: 124/79   Pulse: 82     General Exam:  General - Well developed, well nourished, in no apparent distress. HENT - Normocephalic, atraumatic. Oropharynx clear. Eyes - Sclera anicteric. External Ears - Appear normal.   Neck - No visualized mass. Lungs - Breathing is non-labored. Skin - No significant lesions or discoloration noted. Musculoskeletal - Moves all extremities. No deformities noted. Psychiatric - Normal affect. No hallucinations. Neurological Exam:      MS/Language/Speech - Alert. Attentive and cooperative. Language fluent. Speech is clear. Cranial Nerves - Eye movements full. No nystagmus. No facial asymmetry. Tongue was midline. Shoulder shrug symmetric. Motor - Moves all extremities well with no apparent limitations. No tremor present. Cerebellar - No ataxia or dysmetria. Gait - Deferred. Assessment/Plan:      Sena Greer is a 48 y. o.female who presents for chemodenervation for chronic migraine. Jorge A Ramos was seen today for migraine. Diagnoses and all orders for this visit:    Chronic migraine w/o aura w/o status migrainosus, not intractable  -     Onabotulinumtoxin A (BOTOX (COSMETIC)) injection 200 Units    - Proceed with botulinum toxin injections as noted below.     - RTC 3 months for review and re-injection. Procedure:       Consent: Written consent obtained after the potential risks and benefits have been explained to the patient. Potential risks include:  Pain, bruising, bleeding, infection, flu-like symptoms, over-weakening of injected or adjacent muscles and swallowing dysfunction. Patient was given the botulinum toxin medication guide according to FDA standards. Technique: Botox A 200 unit was dissolved into non-preservative normal saline 4 ml. Gauge 30 facial needles were used for the injection. Procedure: Five units/ 0.1 ml per site unless specified. Occipitalis R3 L3. Cervical paraspinal R2 L2. Trapezius R3 L3. Temporalis R4+1 L4+1. Frontalis R2+1 L2+1. Procerus 1. Corrugators R1 L1. Total injected: 175 units BOTOX 100units/2 cc. Waste: 25 units     Comments: There were no complications. The patient tolerated the procedure well. Signature: Navjot Ashton MD  Date: 9/1/22  [address] [place] [address] Neurology   [address]  Ph: [phone]  Fax: [phone]     I have personally interviewed and examined Mrs. Lou Eddy and I have personally reviewed all relevant records including labs and imaging as noted above. I have written all aspects of this note. More than 50% of this time was used for counseling regarding my diagnosis, prognosis, and plans for management.    Procedure Time: 10 minutes  Total visit time (including procedures): 35 minutes

## 2022-09-08 RX ORDER — TOPIRAMATE 25 MG/1
TABLET ORAL
Qty: 60 TABLET | Refills: 3 | Status: SHIPPED | OUTPATIENT
Start: 2022-09-08 | End: 2022-09-09 | Stop reason: SDUPTHER

## 2022-09-09 RX ORDER — TOPIRAMATE 25 MG/1
TABLET ORAL
Qty: 180 TABLET | Refills: 5 | Status: SHIPPED | OUTPATIENT
Start: 2022-09-09

## 2022-09-09 NOTE — TELEPHONE ENCOUNTER
Patient script was sent with not enough pills. New script pended with updated quant. Please authorize refill  Pharmacy up to date.

## 2022-10-26 ENCOUNTER — CLINICAL DOCUMENTATION (OUTPATIENT)
Dept: CARDIOTHORACIC SURGERY | Age: 47
End: 2022-10-26

## 2022-10-27 DIAGNOSIS — I71.21 ANEURYSM OF ASCENDING AORTA WITHOUT RUPTURE: Primary | ICD-10-CM

## 2022-10-28 ENCOUNTER — OFFICE VISIT (OUTPATIENT)
Dept: BEHAVIORAL/MENTAL HEALTH CLINIC | Age: 47
End: 2022-10-28
Payer: MEDICARE

## 2022-10-28 VITALS
HEIGHT: 67 IN | OXYGEN SATURATION: 100 % | DIASTOLIC BLOOD PRESSURE: 84 MMHG | BODY MASS INDEX: 25.9 KG/M2 | HEART RATE: 70 BPM | WEIGHT: 165 LBS | SYSTOLIC BLOOD PRESSURE: 124 MMHG

## 2022-10-28 DIAGNOSIS — F41.1 GAD (GENERALIZED ANXIETY DISORDER): ICD-10-CM

## 2022-10-28 DIAGNOSIS — F31.75 BIPOLAR 1 DISORDER, DEPRESSED, PARTIAL REMISSION (HCC): Primary | ICD-10-CM

## 2022-10-28 DIAGNOSIS — F51.04 INSOMNIA, PSYCHOPHYSIOLOGICAL: ICD-10-CM

## 2022-10-28 LAB
ALBUMIN SERPL-MCNC: 3.8 G/DL (ref 3.5–5)
ALBUMIN/GLOB SERPL: 1.3 {RATIO} (ref 0.4–1.6)
ALP SERPL-CCNC: 89 U/L (ref 50–136)
ALT SERPL-CCNC: 22 U/L (ref 12–65)
AST SERPL-CCNC: 16 U/L (ref 15–37)
BILIRUB DIRECT SERPL-MCNC: <0.1 MG/DL
BILIRUB SERPL-MCNC: 0.3 MG/DL (ref 0.2–1.1)
GLOBULIN SER CALC-MCNC: 3 G/DL (ref 2.8–4.5)
PROT SERPL-MCNC: 6.8 G/DL (ref 6.3–8.2)
VALPROATE SERPL-MCNC: 60 UG/ML (ref 50–100)

## 2022-10-28 PROCEDURE — 3078F DIAST BP <80 MM HG: CPT | Performed by: NURSE PRACTITIONER

## 2022-10-28 PROCEDURE — 99215 OFFICE O/P EST HI 40 MIN: CPT | Performed by: NURSE PRACTITIONER

## 2022-10-28 PROCEDURE — 3074F SYST BP LT 130 MM HG: CPT | Performed by: NURSE PRACTITIONER

## 2022-10-28 RX ORDER — CLONAZEPAM 1 MG/1
1 TABLET ORAL 2 TIMES DAILY PRN
Qty: 60 TABLET | Refills: 2 | Status: SHIPPED | OUTPATIENT
Start: 2022-11-21 | End: 2022-12-21

## 2022-10-28 RX ORDER — DIVALPROEX SODIUM 500 MG/1
1000 TABLET, EXTENDED RELEASE ORAL NIGHTLY
Qty: 60 TABLET | Refills: 3 | Status: SHIPPED | OUTPATIENT
Start: 2022-10-28 | End: 2022-11-27

## 2022-10-28 RX ORDER — DIVALPROEX SODIUM 250 MG/1
250 TABLET, EXTENDED RELEASE ORAL NIGHTLY
Qty: 30 TABLET | Refills: 3 | Status: SHIPPED | OUTPATIENT
Start: 2022-10-28 | End: 2022-11-27

## 2022-10-28 RX ORDER — ZOLPIDEM TARTRATE 10 MG/1
10 TABLET ORAL NIGHTLY PRN
Qty: 30 TABLET | Refills: 2 | Status: SHIPPED | OUTPATIENT
Start: 2022-10-28 | End: 2022-11-27

## 2022-10-28 ASSESSMENT — PATIENT HEALTH QUESTIONNAIRE - PHQ9
2. FEELING DOWN, DEPRESSED OR HOPELESS: 1
SUM OF ALL RESPONSES TO PHQ QUESTIONS 1-9: 2
SUM OF ALL RESPONSES TO PHQ9 QUESTIONS 1 & 2: 2
1. LITTLE INTEREST OR PLEASURE IN DOING THINGS: 1
SUM OF ALL RESPONSES TO PHQ QUESTIONS 1-9: 2

## 2022-10-28 NOTE — PROGRESS NOTES
OUTPATIENT PSYCHIATRIC RETURN VISIT PROGRESS NOTE    Date of Service: 10/28/2022     Identification    Meredith Flynn is a 48 y.o.  with a past psychiatric history of bipolar d/o, insomnia,JUS  who presents today for a psychiatric follow up appointment. CC:  Routine medication management follow up. Chief Complaint   Patient presents with    Medication Problem        Subjective / Interval History:    Pt comes to clinic today and reports that her mood has improved with the increase in her Depakote ER dose to 1250 mg made on 8/4/22. No longer crying, denies depressive sx, Still loud, irritable, demanding. Did not have lab work as ordered in August but agrees to get labs today. Denies SI/HI. Pt has been medication compliant and denies any side-effects. Pt denies SI, HI and AVH. Does not endorse any manic or psychotic symptoms. Psychiatric Review Of Systems:  Sleep: generally restful sleep  Appetite: ok  Current suicidal/homicidal ideations: Denies SI/ HI  Current auditory/visual hallucinations: Denies     Medications:    Current Outpatient Medications:     divalproex (DEPAKOTE ER) 500 MG extended release tablet, Take 2 tablets by mouth at bedtime, Disp: 60 tablet, Rfl: 3    divalproex (DEPAKOTE ER) 250 MG extended release tablet, Take 1 tablet by mouth at bedtime Take with the two 500 mg tablets to equal 1250 mg Q HS., Disp: 30 tablet, Rfl: 3    [START ON 11/21/2022] clonazePAM (KLONOPIN) 1 MG tablet, Take 1 tablet by mouth 2 times daily as needed for Anxiety for up to 30 days. , Disp: 60 tablet, Rfl: 2    zolpidem (AMBIEN) 10 MG tablet, Take 1 tablet by mouth nightly as needed for Sleep for up to 30 days. , Disp: 30 tablet, Rfl: 2    topiramate (TOPAMAX) 25 MG tablet, Take 75 mg by mouth 2 times daily (with meals), Disp: 180 tablet, Rfl: 5    lisinopril (PRINIVIL;ZESTRIL) 20 MG tablet, Take 1 tablet by mouth in the morning., Disp: 30 tablet, Rfl: 5    omeprazole (PRILOSEC) 40 MG delayed release capsule, Take 1 capsule by mouth in the morning., Disp: 30 capsule, Rfl: 5    ondansetron (ZOFRAN-ODT) 4 MG disintegrating tablet, Take 1 tablet by mouth every 8 hours as needed for Nausea or Vomiting, Disp: 30 tablet, Rfl: 0    tamsulosin (FLOMAX) 0.4 MG capsule, Take 1 capsule by mouth in the morning., Disp: 30 capsule, Rfl: 5    gabapentin (NEURONTIN) 300 MG capsule, Take 2 capsules by mouth in the morning and 2 capsules at noon and 2 capsules before bedtime. Do all this for 180 days. , Disp: 180 capsule, Rfl: 5    cyanocobalamin 100 MCG tablet, Take 100 mcg by mouth daily, Disp: , Rfl:     naloxone 4 MG/0.1ML LIQD nasal spray, Use 1 spray intranasally, then discard. Repeat with new spray every 2 min as needed for opioid overdose symptoms, alternating nostrils. , Disp: , Rfl:        PMH:  Past Medical History:   Diagnosis Date    Aneurysm ([facility]     Aorta aneurysm measured 4.8/4.7cm on 10/06/2021, monitoring at present time    Bicuspid aortic valve     CAD (coronary artery disease)     Echo on 11/21/2018 showed  EF 50-55%    Chronic pain 3/14/2016    Cigarette smoker 11/22/2016    Controlled type 2 diabetes mellitus without complication, with long-term current use of insulin ([address] 11/12/2018    Fatty liver 7/28/2022    Hypercholesterolemia 11/12/2018    Hypertension     manged with med    Insomnia 3/14/2016    Obesity (BMI 30-39.9) 3/14/2016    Psychiatric disorder     anxiety , ADHD    Type 2 diabetes mellitus ([address] 1/28/2022    Type 2 diabetes mellitus with peripheral vascular disease ([address] 11/4/2019    no med at present time (02/2022) corrected with weight loss    Unspecified adverse effect of anesthesia     high anxiety after surgery, hiper       Vitals:  Vitals:    10/28/22 0753   BP: 124/84   Pulse: 70   SpO2: 100%       ROS:  Psychological ROS: negative for depression, irritability, mood swings, and sleep disturbance    Mental Status Exam:   Appearance  Appears stated age, Well-kept, Appropriately attired, Well-nourished, Cooperative, and Maintains eye-contact  Behavior  Anxious, Attention Seeking, Imulsive, and Cooperative  Psychomotor Activity within normal limits  Gait and Station Normal Laina and Station and Normal Balance  Speech   loud  Mood    is irritable  Affect    irritable and angry  Thought Process Circumstantial  Thought Content/Perceptual Disturbances free of delusions, free of hallucinations, and not internally preoccupied  Cognition/Sensorium Alert, Fully oriented, Normal concentration/ Attention, Recent memory intact, Remote memory intact, and Fund of knowledge adequate for level of education  Insight  fair  Judgment fair  Assessment:    Yaneth Rosario was seen today for medication problem. Diagnoses and all orders for this visit:    Bipolar 1 disorder, depressed, partial remission ([facility] [address]    JUS (generalized anxiety disorder)  -     clonazePAM (KLONOPIN) 1 MG tablet; Take 1 tablet by mouth 2 times daily as needed for Anxiety for up to 30 days. Insomnia, psychophysiological  -     zolpidem (AMBIEN) 10 MG tablet; Take 1 tablet by mouth nightly as needed for Sleep for up to 30 days. Other orders  -     divalproex (DEPAKOTE ER) 500 MG extended release tablet; Take 2 tablets by mouth at bedtime  -     divalproex (DEPAKOTE ER) 250 MG extended release tablet; Take 1 tablet by mouth at bedtime Take with the two 500 mg tablets to equal 1250 mg Q HS. Patient Education and Counseling:  Supportive therapy provided for identified psychosocial stressors. Medication education provided and decisions regarding medication regimen discussed with patient. Plan:  -  Continue medication as currently ordered. Adjust Depakote based on VPA. VPA , hepatic function test today. -  Crisis plan reviewed and patient verbally contracts for safety.   Go to ED with emergent symptoms or safety concerns.  -  Risks, benefits, side effects of medications, including any / all black box warnings, discussed with patient, who verbalizes their understanding.     - [facility] web site checked for controlled substances. Disposition:  home    Follow Up:  Return in 3 months, or call in the interim for any side-effects, decompensation, questions, or problems between now and the next visit. Return in about 3 months (around 1/28/2023). 48 minutes face to face with the patient with more than 50% of the total time spent on education, counseling, & coordination of care of the patient regarding ongoing psychiatric illness.         [address] [facility] [address]

## 2022-11-03 ENCOUNTER — TELEPHONE (OUTPATIENT)
Dept: NEUROLOGY | Age: 47
End: 2022-11-03

## 2022-11-03 DIAGNOSIS — G43.709 CHRONIC MIGRAINE W/O AURA W/O STATUS MIGRAINOSUS, NOT INTRACTABLE: Primary | ICD-10-CM

## 2022-11-10 ENCOUNTER — TELEPHONE (OUTPATIENT)
Dept: FAMILY MEDICINE CLINIC | Facility: CLINIC | Age: 47
End: 2022-11-10

## 2022-11-17 ENCOUNTER — OFFICE VISIT (OUTPATIENT)
Dept: UROLOGY | Age: 47
End: 2022-11-17
Payer: MEDICARE

## 2022-11-17 DIAGNOSIS — N39.0 URINARY TRACT INFECTION WITHOUT HEMATURIA, SITE UNSPECIFIED: ICD-10-CM

## 2022-11-17 DIAGNOSIS — R33.9 RETENTION OF URINE, UNSPECIFIED: Primary | ICD-10-CM

## 2022-11-17 DIAGNOSIS — R39.14 FEELING OF INCOMPLETE BLADDER EMPTYING: ICD-10-CM

## 2022-11-17 LAB
BILIRUBIN, URINE, POC: NEGATIVE
BLOOD URINE, POC: NORMAL
GLUCOSE URINE, POC: NEGATIVE
KETONES, URINE, POC: 15
LEUKOCYTE ESTERASE, URINE, POC: NEGATIVE
NITRITE, URINE, POC: POSITIVE
PH, URINE, POC: 5.5 (ref 4.6–8)
PROTEIN,URINE, POC: NEGATIVE
PVR, POC: 191 CC
SPECIFIC GRAVITY, URINE, POC: 1.01 (ref 1–1.03)
URINALYSIS CLARITY, POC: NORMAL
URINALYSIS COLOR, POC: NORMAL
UROBILINOGEN, POC: NORMAL

## 2022-11-17 PROCEDURE — 51798 US URINE CAPACITY MEASURE: CPT | Performed by: NURSE PRACTITIONER

## 2022-11-17 PROCEDURE — 81003 URINALYSIS AUTO W/O SCOPE: CPT | Performed by: NURSE PRACTITIONER

## 2022-11-17 PROCEDURE — 99214 OFFICE O/P EST MOD 30 MIN: CPT | Performed by: NURSE PRACTITIONER

## 2022-11-17 RX ORDER — NITROFURANTOIN 25; 75 MG/1; MG/1
100 CAPSULE ORAL 2 TIMES DAILY
Qty: 20 CAPSULE | Refills: 0 | Status: SHIPPED | OUTPATIENT
Start: 2022-11-17 | End: 2022-11-27

## 2022-11-17 RX ORDER — TAMSULOSIN HYDROCHLORIDE 0.4 MG/1
0.4 CAPSULE ORAL 2 TIMES DAILY
Qty: 180 CAPSULE | Refills: 3 | Status: SHIPPED | OUTPATIENT
Start: 2022-11-17

## 2022-11-17 ASSESSMENT — ENCOUNTER SYMPTOMS
BACK PAIN: 0
NAUSEA: 0

## 2022-11-17 NOTE — PROGRESS NOTES
[address] [place] [address], [facility] [address]. [address]  [phone]          Nora Jones  : 1975    Chief Complaint   Patient presents with    Urinary Retention    Incontinence          HPI     Nora Jones is a 48 y.o. female    Here today for follow up on urinary retention. She has not been seen since 2021. She says her PCP has been prescribing the Flomax. She has been voiding without any difficulty. Recently she has been traveling back and forth to the [facility] to help care for her grandmother. Over the last couple months she has found that she is having some urinary dribbling and feeling of urinary frequency and urgency. At times she feels that she is not completely emptying the bladder. PVR today reveals on 191mls    She has had back surgery since her last visit. History as copied from Previous note. Initially she was seen as an ER follow up after an episode of urinary retention. Catheter was inserted 21 in the ER after pt had not voided for 24 hours. 900mls was drained from the bladder. Pt tells me this has happened before and she has seen the [facility] urology in the past ([facility]). She has had numerous test done. She took flomax for some time, but has not recently been on this medication. She say that she was told a surgery to her \"bladder flap\" would fix her problem. Over all she thinks she has been voiding well up until a week ago. Last visit with [facility] urology was . Note from there reports that She was last seen by urology on 14 by Dr. Gabriela Quintanilla for urinary retention. Per Dr. John Garcia previous note, MRI was performed to evaluate urethral diverticulum which did not show any evidence of urethral mass. Cystoscopy was normal and UDS showed an obstructive process on 9/3/14. She was started on Flomax by NP on 14 and was subsequently lost to follow up. She has not been taking her Flomax.  Today the patient presents in hospital follow-up for catheter removal with voiding trial. She presented to [address] [place] [address] emergency room on 4/27/17 with complaints of difficulty urinating. Bladder scan showed >600 mL urine in the bladder and a [facility] was placed at that time. Patient was discharged with urology follow-up. 8 pages of outside ER records personally reviewed today. She admits to a 30 years history of smoking up to 3 packs/day = 90 pyhx, now down to 1/2 ppd. No hematuria hx.        Past Medical History:   Diagnosis Date    Aneurysm ([address]     Aorta aneurysm measured 4.8/4.7cm on 10/06/2021, monitoring at present time    Bicuspid aortic valve     CAD (coronary artery disease)     Echo on 11/21/2018 showed  EF 50-55%    Chronic pain 3/14/2016    Cigarette smoker 11/22/2016    Controlled type 2 diabetes mellitus without complication, with long-term current use of insulin ([address] 11/12/2018    Fatty liver 7/28/2022    Hypercholesterolemia 11/12/2018    Hypertension     manged with med    Insomnia 3/14/2016    Obesity (BMI 30-39.9) 3/14/2016    Psychiatric disorder     anxiety , ADHD    Type 2 diabetes mellitus ([address] 1/28/2022    Type 2 diabetes mellitus with peripheral vascular disease ([address] 11/4/2019    no med at present time (02/2022) corrected with weight loss    Unspecified adverse effect of anesthesia     high anxiety after surgery, hiper     Past Surgical History:   Procedure Laterality Date    COLONOSCOPY N/A 4/28/2016    COLONOSCOPY  BMI 30 ROOM 603 performed by Alisa Dawson MD at [facility] ENDOSCOPY    HYSTERECTOMY (CERVIX STATUS UNKNOWN)       Current Outpatient Medications   Medication Sig Dispense Refill    Onabotulinumtoxin A (BOTOX) 200 units injection Inject 200 Units into the muscle every 3 months 1 each 3    divalproex (DEPAKOTE ER) 500 MG extended release tablet Take 2 tablets by mouth at bedtime 60 tablet 3    divalproex (DEPAKOTE ER) 250 MG extended release tablet Take 1 tablet by mouth at bedtime Take with the two 500 mg tablets to equal 1250 mg Q HS. 30 tablet 3    [START ON 2022] clonazePAM (KLONOPIN) 1 MG tablet Take 1 tablet by mouth 2 times daily as needed for Anxiety for up to 30 days. 60 tablet 2    zolpidem (AMBIEN) 10 MG tablet Take 1 tablet by mouth nightly as needed for Sleep for up to 30 days. 30 tablet 2    topiramate (TOPAMAX) 25 MG tablet Take 75 mg by mouth 2 times daily (with meals) 180 tablet 5    lisinopril (PRINIVIL;ZESTRIL) 20 MG tablet Take 1 tablet by mouth in the morning. 30 tablet 5    omeprazole (PRILOSEC) 40 MG delayed release capsule Take 1 capsule by mouth in the morning. 30 capsule 5    ondansetron (ZOFRAN-ODT) 4 MG disintegrating tablet Take 1 tablet by mouth every 8 hours as needed for Nausea or Vomiting 30 tablet 0    tamsulosin (FLOMAX) 0.4 MG capsule Take 1 capsule by mouth in the morning. 30 capsule 5    gabapentin (NEURONTIN) 300 MG capsule Take 2 capsules by mouth in the morning and 2 capsules at noon and 2 capsules before bedtime. Do all this for 180 days. 180 capsule 5    cyanocobalamin 100 MCG tablet Take 100 mcg by mouth daily      naloxone 4 MG/0.1ML LIQD nasal spray Use 1 spray intranasally, then discard. Repeat with new spray every 2 min as needed for opioid overdose symptoms, alternating nostrils. No current facility-administered medications for this visit.      No Known Allergies  Social History     Socioeconomic History    Marital status:      Spouse name: Not on file    Number of children: Not on file    Years of education: Not on file    Highest education level: Not on file   Occupational History    Not on file   Tobacco Use    Smoking status: Former     Packs/day: 0.50     Types: Cigarettes     Quit date: 2018     Years since quitting: 3.9    Smokeless tobacco: Never    Tobacco comments:     Quit smokin yrPPD   Substance and Sexual Activity    Alcohol use: No     Alcohol/week: 0.0 standard drinks    Drug use: No    Sexual activity: Not on file   Other Topics Concern    Not on file   Social History Narrative    , not working. 2 children, 5 grandchildren     Social Determinants of Health     Financial Resource Strain: Medium Risk    Difficulty of Paying Living Expenses: Somewhat hard   Food Insecurity: No Food Insecurity    Worried About Running Out of Food in the Last Year: Never true    Ran Out of Food in the Last Year: Never true   Transportation Needs: No Transportation Needs    Lack of Transportation (Medical): No    Lack of Transportation (Non-Medical): No   Physical Activity: Not on file   Stress: Not on file   Social Connections: Not on file   Intimate Partner Violence: Not on file   Housing Stability: Not on file     Family History   Problem Relation Age of Onset    Other Mother         bipolar disorder    COPD Mother     High Cholesterol Mother     Diabetes Father     Hypertension Father     Diabetes Sister     Hypertension Sister     Cancer Father     Breast Cancer Neg Hx        Review of Systems  Constitutional:   Negative for fever. GI:  Negative for nausea. Genitourinary: Positive for incomplete emptying. Musculoskeletal:  Negative for back pain.     Urinalysis  UA - Dipstick  Results for orders placed or performed in visit on 11/17/22   AMB POC URINALYSIS DIP STICK AUTO W/O MICRO   Result Value Ref Range    Color (UA POC)      Clarity (UA POC)      Glucose, Urine, POC Negative Negative    Bilirubin, Urine, POC Negative Negative    KETONES, Urine, POC 15 Negative    Specific Gravity, Urine, POC 1.015 1.001 - 1.035    Blood (UA POC) Trace-intact Negative    pH, Urine, POC 5.5 4.6 - 8.0    Protein, Urine, POC Negative Negative    Urobilinogen, POC 0.2 mg/dL     Nitrite, Urine, POC Positive Negative    Leukocyte Esterase, Urine, POC Negative Negative     PHYSICAL EXAM    GENERAL: No acute distress, Awake, Alert, Oriented X 3, Gait normal  ABDOMEN: soft, non tender, non-distended, positive bowel sounds, no organomegaly, no palpable masses, no guarding, no rebound tenderness  SKIN: No rash, no erythema, no lacerations or abrasions, no ecchymosis  MUSCULOSKELETAL - MAEW, no edema       Assessment and Plan    ICD-10-CM    1. Retention of urine, unspecified  R33.9 AMB POC URINALYSIS DIP STICK AUTO W/O MICRO     AMB POC PVR, TRAMAINE,POST-VOID RES,US,NON-IMAGING      2. Feeling of incomplete bladder emptying  R39.14 AMB POC URINALYSIS DIP STICK AUTO W/O MICRO     AMB POC PVR, TRAMAINE,POST-VOID RES,US,NON-IMAGING        PLAN:  Urine appears infected  Culture will be sent  Macrobid 100 mg twice a day for 10 days  Patient will continue Flomax 2 daily  Follow-up in 3 weeks for recheck  Reassess symptoms then if no better consider further testing. ERIK Potts - NP  Dr. Dougie Robins is supervising physician today and he approves plan of care. Return in about 3 weeks (around 12/8/2022). Elements of this note have been dictated using speech recognition software. Although reviewed, errors of speech recognition may have occurred.

## 2022-11-18 ENCOUNTER — OFFICE VISIT (OUTPATIENT)
Dept: FAMILY MEDICINE CLINIC | Facility: CLINIC | Age: 47
End: 2022-11-18
Payer: MEDICARE

## 2022-11-18 VITALS
WEIGHT: 162 LBS | OXYGEN SATURATION: 98 % | HEART RATE: 100 BPM | HEIGHT: 67 IN | BODY MASS INDEX: 25.43 KG/M2 | DIASTOLIC BLOOD PRESSURE: 84 MMHG | SYSTOLIC BLOOD PRESSURE: 128 MMHG | TEMPERATURE: 98.5 F

## 2022-11-18 DIAGNOSIS — G89.29 CHRONIC LOW BACK PAIN, UNSPECIFIED BACK PAIN LATERALITY, UNSPECIFIED WHETHER SCIATICA PRESENT: ICD-10-CM

## 2022-11-18 DIAGNOSIS — N95.1 VASOMOTOR SYMPTOMS DUE TO MENOPAUSE: ICD-10-CM

## 2022-11-18 DIAGNOSIS — R11.0 NAUSEA: ICD-10-CM

## 2022-11-18 DIAGNOSIS — M48.062 LUMBAR STENOSIS WITH NEUROGENIC CLAUDICATION: Primary | ICD-10-CM

## 2022-11-18 DIAGNOSIS — M54.50 CHRONIC LOW BACK PAIN, UNSPECIFIED BACK PAIN LATERALITY, UNSPECIFIED WHETHER SCIATICA PRESENT: ICD-10-CM

## 2022-11-18 PROCEDURE — 3074F SYST BP LT 130 MM HG: CPT | Performed by: NURSE PRACTITIONER

## 2022-11-18 PROCEDURE — 3078F DIAST BP <80 MM HG: CPT | Performed by: NURSE PRACTITIONER

## 2022-11-18 PROCEDURE — 99214 OFFICE O/P EST MOD 30 MIN: CPT | Performed by: NURSE PRACTITIONER

## 2022-11-18 RX ORDER — CLONIDINE HYDROCHLORIDE 0.1 MG/1
0.05 TABLET ORAL 2 TIMES DAILY
Qty: 60 TABLET | Refills: 0 | Status: SHIPPED | OUTPATIENT
Start: 2022-11-18

## 2022-11-18 RX ORDER — ONDANSETRON 4 MG/1
4 TABLET, ORALLY DISINTEGRATING ORAL EVERY 12 HOURS PRN
Qty: 60 TABLET | Refills: 0 | Status: SHIPPED | OUTPATIENT
Start: 2022-11-18

## 2022-11-18 ASSESSMENT — ENCOUNTER SYMPTOMS
VOMITING: 1
BLOOD IN STOOL: 0
DIARRHEA: 0
ABDOMINAL PAIN: 0
NAUSEA: 1
CONSTIPATION: 0
SHORTNESS OF BREATH: 0

## 2022-11-18 ASSESSMENT — PATIENT HEALTH QUESTIONNAIRE - PHQ9
SUM OF ALL RESPONSES TO PHQ QUESTIONS 1-9: 3
7. TROUBLE CONCENTRATING ON THINGS, SUCH AS READING THE NEWSPAPER OR WATCHING TELEVISION: 0
9. THOUGHTS THAT YOU WOULD BE BETTER OFF DEAD, OR OF HURTING YOURSELF: 0
2. FEELING DOWN, DEPRESSED OR HOPELESS: 2
10. IF YOU CHECKED OFF ANY PROBLEMS, HOW DIFFICULT HAVE THESE PROBLEMS MADE IT FOR YOU TO DO YOUR WORK, TAKE CARE OF THINGS AT HOME, OR GET ALONG WITH OTHER PEOPLE: 0
6. FEELING BAD ABOUT YOURSELF - OR THAT YOU ARE A FAILURE OR HAVE LET YOURSELF OR YOUR FAMILY DOWN: 0
5. POOR APPETITE OR OVEREATING: 1
4. FEELING TIRED OR HAVING LITTLE ENERGY: 0
SUM OF ALL RESPONSES TO PHQ QUESTIONS 1-9: 3
3. TROUBLE FALLING OR STAYING ASLEEP: 0
1. LITTLE INTEREST OR PLEASURE IN DOING THINGS: 0
SUM OF ALL RESPONSES TO PHQ9 QUESTIONS 1 & 2: 2
SUM OF ALL RESPONSES TO PHQ QUESTIONS 1-9: 3
8. MOVING OR SPEAKING SO SLOWLY THAT OTHER PEOPLE COULD HAVE NOTICED. OR THE OPPOSITE, BEING SO FIGETY OR RESTLESS THAT YOU HAVE BEEN MOVING AROUND A LOT MORE THAN USUAL: 0
SUM OF ALL RESPONSES TO PHQ QUESTIONS 1-9: 3

## 2022-11-18 NOTE — PROGRESS NOTES
Stephane Horner is a 50 y.o. female who presents today for the following:  Chief Complaint   Patient presents with    Nausea     c/o nausea x several weeks    Other     Hot flashes at night x2 months       No Known Allergies    Current Outpatient Medications   Medication Sig Dispense Refill    Handicap Placard MISC by Does not apply route M54.9, G89.29, M48.062 1 each 0    cloNIDine (CATAPRES) 0.1 MG tablet Take 0.5 tablets by mouth 2 times daily 60 tablet 0    ondansetron (ZOFRAN-ODT) 4 MG disintegrating tablet Take 1 tablet by mouth every 12 hours as needed for Nausea or Vomiting 60 tablet 0    nitrofurantoin, macrocrystal-monohydrate, (MACROBID) 100 MG capsule Take 1 capsule by mouth 2 times daily for 10 days 20 capsule 0    tamsulosin (FLOMAX) 0.4 MG capsule Take 1 capsule by mouth in the morning and at bedtime 180 capsule 3    Onabotulinumtoxin A (BOTOX) 200 units injection Inject 200 Units into the muscle every 3 months 1 each 3    divalproex (DEPAKOTE ER) 500 MG extended release tablet Take 2 tablets by mouth at bedtime 60 tablet 3    divalproex (DEPAKOTE ER) 250 MG extended release tablet Take 1 tablet by mouth at bedtime Take with the two 500 mg tablets to equal 1250 mg Q HS. 30 tablet 3    [START ON 11/21/2022] clonazePAM (KLONOPIN) 1 MG tablet Take 1 tablet by mouth 2 times daily as needed for Anxiety for up to 30 days. 60 tablet 2    zolpidem (AMBIEN) 10 MG tablet Take 1 tablet by mouth nightly as needed for Sleep for up to 30 days. 30 tablet 2    topiramate (TOPAMAX) 25 MG tablet Take 75 mg by mouth 2 times daily (with meals) 180 tablet 5    lisinopril (PRINIVIL;ZESTRIL) 20 MG tablet Take 1 tablet by mouth in the morning. 30 tablet 5    omeprazole (PRILOSEC) 40 MG delayed release capsule Take 1 capsule by mouth in the morning. 30 capsule 5    tamsulosin (FLOMAX) 0.4 MG capsule Take 1 capsule by mouth in the morning.  30 capsule 5    gabapentin (NEURONTIN) 300 MG capsule Take 2 capsules by mouth in the morning and 2 capsules at noon and 2 capsules before bedtime. Do all this for 180 days. 180 capsule 5    cyanocobalamin 100 MCG tablet Take 100 mcg by mouth daily      naloxone 4 MG/0.1ML LIQD nasal spray Use 1 spray intranasally, then discard. Repeat with new spray every 2 min as needed for opioid overdose symptoms, alternating nostrils. No current facility-administered medications for this visit.        Past Medical History:   Diagnosis Date    Aneurysm ([facility] [address] [address]     Aorta aneurysm measured 4.8/4.7cm on 10/06/2021, monitoring at present time    Bicuspid aortic valve     CAD (coronary artery disease)     Echo on 2018 showed  EF 50-55%    Chronic pain 3/14/2016    Cigarette smoker 2016    Controlled type 2 diabetes mellitus without complication, with long-term current use of insulin ([facility] [address] 2018    Fatty liver 2022    Hypercholesterolemia 2018    Hypertension     manged with med    Insomnia 3/14/2016    Obesity (BMI 30-39.9) 3/14/2016    Psychiatric disorder     anxiety , ADHD    Type 2 diabetes mellitus ([facility] [address] 2022    Type 2 diabetes mellitus with peripheral vascular disease ([facility] [address] 2019    no med at present time (2022) corrected with weight loss    Unspecified adverse effect of anesthesia     high anxiety after surgery, hiper       Past Surgical History:   Procedure Laterality Date    COLONOSCOPY N/A 2016    COLONOSCOPY  BMI 30 ROOM 603 performed by Sahil Moncada MD at [facility] ENDOSCOPY    HYSTERECTOMY (CERVIX STATUS UNKNOWN)         Social History     Tobacco Use    Smoking status: Former     Packs/day: 0.50     Types: Cigarettes     Quit date: 2018     Years since quitting: 3.9    Smokeless tobacco: Never    Tobacco comments:     Quit smokin yrPPD   Substance Use Topics    Alcohol use: No     Alcohol/week: 0.0 standard drinks        Family History   Problem Relation Age of Onset    Other Mother         bipolar disorder    COPD Mother     High Cholesterol Mother     Diabetes Father     Hypertension Father     Diabetes Sister     Hypertension Sister     Cancer Father     Breast Cancer Neg Hx        HPI   Patient presents for acute visit for nausea and severe hot flashes. She has hx of migraines followed by neurology, JUS, fatty liver, Bipolar 1, insomnia, diabetes Type 2, elevated ldl cholesterol, HTN, AAA, gastritis, thrombocytopenia (evaluated by hematology), urinary retention, duodenal ulcer, s/p lumbar TLIF Feb this year, and bicuspid aortic valve. She is status post hysterectomy, still has ovaries. Hot flashes worst at night. Fans on, minimal clothing, and turning heat down does not help and disturbs her sleep. Late 25s is when she had hysterectomy and cervix removed. No fevers. COVID-19 negative. Nausea and some vomiting at times due to mourning the loss of her family members. He also has other family problems that is causing her anxiety and for her to be nauseated. She reports she gets nauseated this time of the year and the Zofran helps. She has not followed up with GI in about a year. Patient agrees to follow-up with GI with her history of duodenal ulcer and GERD with her nausea. Urology saw yesterday with dribbling. On abx for UTI. No smoking. No hx of blood clots or PE. Refuses further mammograms d/t breast pain and bruising last time. Declines pain management, does not want to be on opioids or narcotics. Review of Systems   Constitutional:  Negative for diaphoresis, fatigue and unexpected weight change. Respiratory:  Negative for shortness of breath. Cardiovascular: Negative. Gastrointestinal:  Positive for nausea and vomiting. Negative for abdominal pain, blood in stool, constipation and diarrhea. Genitourinary:  Negative for vaginal bleeding and vaginal pain.       /84   Pulse 100   Temp 98.5 °F (36.9 °C) (Oral)   Ht 5' 7\" (1.702 m)   Wt 162 lb (73.5 kg)   SpO2 98%   BMI 25.37 kg/m²     Physical Exam  Constitutional:       General: She is not in acute distress. Appearance: Normal appearance. She is normal weight. She is not ill-appearing. HENT:      Head: Normocephalic and atraumatic. Right Ear: External ear normal.      Left Ear: External ear normal.   Eyes:      Extraocular Movements: Extraocular movements intact. Conjunctiva/sclera: Conjunctivae normal.      Pupils: Pupils are equal, round, and reactive to light. Cardiovascular:      Rate and Rhythm: Normal rate and regular rhythm. Pulses: Normal pulses. Heart sounds: Normal heart sounds. Pulmonary:      Effort: Pulmonary effort is normal.      Breath sounds: Normal breath sounds. Abdominal:      General: Bowel sounds are normal. There is no distension. Palpations: Abdomen is soft. Tenderness: There is no abdominal tenderness. Musculoskeletal:         General: Normal range of motion. Cervical back: Normal range of motion and neck supple. Right lower leg: No edema. Left lower leg: No edema. Skin:     General: Skin is warm and dry. Coloration: Skin is not jaundiced or pale. Neurological:      General: No focal deficit present. Mental Status: She is alert and oriented to person, place, and time. Psychiatric:         Mood and Affect: Mood normal.         Behavior: Behavior normal.         Thought Content: Thought content normal.         Judgment: Judgment normal.        1. Lumbar stenosis with neurogenic claudication  -     Handicap [facility]; Starting Fri 11/18/2022, Disp-1 each, R-0, XfchgP11.9, G89.29, M48.062  2. Chronic low back pain, unspecified back pain laterality, unspecified whether sciatica present  -     Handicap [facility]; Starting Fri 11/18/2022, Disp-1 each, R-0, XprikB47.9, G89.29, M48.062  3. Vasomotor symptoms due to menopause  -     cloNIDine (CATAPRES) 0.1 MG tablet; Take 0.5 tablets by mouth 2 times daily, Disp-60 tablet, R-0Normal  4.  Nausea  - ondansetron (ZOFRAN-ODT) 4 MG disintegrating tablet; Take 1 tablet by mouth every 12 hours as needed for Nausea or Vomiting, Disp-60 tablet, R-0Normal   Discussed with patient to monitor her blood pressure and we will monitor her blood pressure as well. Discussed with collaborating physician Dr. Nacho Alexander who is in agreement. Patient informed, we will call with blood work results within one week. If you have not heard regarding results in over a week, please contact office. You can also review results on [employer]. Follow-up and Dispositions    Return in about 2 weeks (around 12/2/2022) for Medication Evaluation.          Fernanda Farr, ERIK - CNP

## 2022-11-20 LAB
BACTERIA SPEC CULT: NORMAL
SERVICE CMNT-IMP: NORMAL

## 2022-11-28 ENCOUNTER — HOSPITAL ENCOUNTER (OUTPATIENT)
Dept: CT IMAGING | Age: 47
Discharge: HOME | End: 2022-12-01
Payer: MEDICARE

## 2022-11-28 DIAGNOSIS — I71.21 ANEURYSM OF ASCENDING AORTA WITHOUT RUPTURE: ICD-10-CM

## 2022-11-28 LAB — CREAT BLD-MCNC: 0.74 MG/DL (ref 0.8–1.5)

## 2022-11-28 PROCEDURE — 82565 ASSAY OF CREATININE: CPT

## 2022-11-28 PROCEDURE — 71275 CT ANGIOGRAPHY CHEST: CPT

## 2022-11-28 PROCEDURE — 2580000003 HC RX 258: Performed by: PHYSICIAN ASSISTANT

## 2022-11-28 PROCEDURE — 6360000004 HC RX CONTRAST MEDICATION: Performed by: PHYSICIAN ASSISTANT

## 2022-11-28 RX ORDER — SODIUM CHLORIDE 0.9 % (FLUSH) 0.9 %
10 SYRINGE (ML) INJECTION
Status: COMPLETED | OUTPATIENT
Start: 2022-11-28 | End: 2022-11-28

## 2022-11-28 RX ORDER — 0.9 % SODIUM CHLORIDE 0.9 %
100 INTRAVENOUS SOLUTION INTRAVENOUS
Status: COMPLETED | OUTPATIENT
Start: 2022-11-28 | End: 2022-11-28

## 2022-11-28 RX ADMIN — SODIUM CHLORIDE, PRESERVATIVE FREE 10 ML: 5 INJECTION INTRAVENOUS at 10:45

## 2022-11-28 RX ADMIN — SODIUM CHLORIDE 100 ML: 9 INJECTION, SOLUTION INTRAVENOUS at 10:45

## 2022-11-28 RX ADMIN — IOPAMIDOL 75 ML: 755 INJECTION, SOLUTION INTRAVENOUS at 10:44

## 2022-12-01 ENCOUNTER — OFFICE VISIT (OUTPATIENT)
Dept: NEUROLOGY | Age: 47
End: 2022-12-01

## 2022-12-01 VITALS — HEART RATE: 80 BPM | SYSTOLIC BLOOD PRESSURE: 176 MMHG | DIASTOLIC BLOOD PRESSURE: 106 MMHG

## 2022-12-01 DIAGNOSIS — G43.709 CHRONIC MIGRAINE W/O AURA W/O STATUS MIGRAINOSUS, NOT INTRACTABLE: Primary | ICD-10-CM

## 2022-12-01 NOTE — LETTER
Informed Consent for Botulinumtoxina  (Botox)                                                                         [facility]#_[id number]    Patient Name: Sravani Tinsley             [facility]:_[id number]      I give consent for the following procedure(s)    Botulinumtoxina (Botox) injections to be performed by:    [x]   Dr. Taryn Montes    []   Dr. Sanam Paulino    []   Dr. Minerva Gann    I understand that my provider will have the main responsibility for my care specific to procedure. I understand the the doctor may be fellows, residents or other qualified first assistant's may be involved in my procedure under the supervision of the above provider. My provider has explained to me my condition, the nature and purpose of each procedure, and the risks and types of complications involved, the potential benefits and drawbacks, potential problems related to recovery, the likelihood of success, the possible results of non-treatment,and the reasonable options or alternatives. I have had the chance to ask questions about the planned procedure and all my questions have been answered to my satisfaction. I have received no promises from anyone about the results that may be obtained from the procedure. All procedures involve risk. Routine risks include, but are not limited to, perforation and/or injury to nearby blood vessels, nerve and/or organs, infection, blood clots. Other risks may include paralysis and/or _____________________________________. I understand that the information I have received about the risks may not be fully completed and other less common risks.       ________________________________________Date:_______________  Patient Signature    ________________________________________Date:_______________  Provider Signature                         _______________________________________ Date:_______________             Witness Signature

## 2022-12-01 NOTE — PROGRESS NOTES
[facility]  [address], [address] [facility]  Phone: [phone] Fax [phone]  Dr. Balta Cole        Patient: Lance Baldwin  Provider: Balta Cole MD     Procedure note: Botulinum Toxin injections     Indication: Chronic Migraine        Subjective:      Patient presents for chemodenervation for chronic migraine. She was last seen September 2022. Injection was met with benefit with reduced headache severity and frequency. She has not noticed much in the way of any wearing off before the next injection. No adverse effects. Headaches are described as diffuse and bitemporal head pain. She continues to follow-up with Dr. Eloina Stacy for medication management. Past medical history, surgical history, social history, family history, medications and allergies were all reviewed and updated as appropriate. Current Outpatient Medications on File Prior to Visit   Medication Sig Dispense Refill    Handicap Placard MISC by Does not apply route M54.9, G89.29, M48.062 1 each 0    cloNIDine (CATAPRES) 0.1 MG tablet Take 0.5 tablets by mouth 2 times daily 60 tablet 0    ondansetron (ZOFRAN-ODT) 4 MG disintegrating tablet Take 1 tablet by mouth every 12 hours as needed for Nausea or Vomiting 60 tablet 0    tamsulosin (FLOMAX) 0.4 MG capsule Take 1 capsule by mouth in the morning and at bedtime 180 capsule 3    Onabotulinumtoxin A (BOTOX) 200 units injection Inject 200 Units into the muscle every 3 months 1 each 3    divalproex (DEPAKOTE ER) 500 MG extended release tablet Take 2 tablets by mouth at bedtime 60 tablet 3    divalproex (DEPAKOTE ER) 250 MG extended release tablet Take 1 tablet by mouth at bedtime Take with the two 500 mg tablets to equal 1250 mg Q HS. 30 tablet 3    clonazePAM (KLONOPIN) 1 MG tablet Take 1 tablet by mouth 2 times daily as needed for Anxiety for up to 30 days.  60 tablet 2    zolpidem (AMBIEN) 10 MG tablet Take 1 tablet by mouth nightly as needed for Sleep for up to 30 days. 30 tablet 2    topiramate (TOPAMAX) 25 MG tablet Take 75 mg by mouth 2 times daily (with meals) 180 tablet 5    lisinopril (PRINIVIL;ZESTRIL) 20 MG tablet Take 1 tablet by mouth in the morning. 30 tablet 5    omeprazole (PRILOSEC) 40 MG delayed release capsule Take 1 capsule by mouth in the morning. 30 capsule 5    tamsulosin (FLOMAX) 0.4 MG capsule Take 1 capsule by mouth in the morning. 30 capsule 5    gabapentin (NEURONTIN) 300 MG capsule Take 2 capsules by mouth in the morning and 2 capsules at noon and 2 capsules before bedtime. Do all this for 180 days. 180 capsule 5    cyanocobalamin 100 MCG tablet Take 100 mcg by mouth daily      naloxone 4 MG/0.1ML LIQD nasal spray Use 1 spray intranasally, then discard. Repeat with new spray every 2 min as needed for opioid overdose symptoms, alternating nostrils. No current facility-administered medications on file prior to visit. Exam:      Visit Vitals  Vitals:    12/01/22 1333   BP: (!) 176/106   Pulse: 80     General Exam:  General - Well developed, well nourished, in no apparent distress. HENT - Normocephalic, atraumatic. Oropharynx clear. Eyes - Sclera anicteric. External Ears - Appear normal.   Neck - No visualized mass. Lungs - Breathing is non-labored. Skin - No significant lesions or discoloration noted. Musculoskeletal - Moves all extremities. No deformities noted. Psychiatric - Normal affect. No hallucinations. Neurological Exam:      MS/Language/Speech - Alert. Attentive and cooperative. Language fluent. Speech is clear. Cranial Nerves - Eye movements full. No nystagmus. No facial asymmetry. Tongue was midline. Shoulder shrug symmetric. Motor - Moves all extremities well with no apparent limitations. No tremor present. Cerebellar - No ataxia or dysmetria. Gait - Deferred. Assessment/Plan:      Alisa Lake is a 48 y. o.female who presents for chemodenervation for chronic migraine. Diagnoses and all orders for this visit:    Chronic migraine w/o aura w/o status migrainosus, not intractable  -     Onabotulinumtoxin A (BOTOX) injection 200 Units    Proceed with botulinum toxin injections as noted below. RTC 3 months for review and re-injection. Procedure:       Consent: Written consent obtained after the potential risks and benefits have been explained to the patient. Potential risks include:  Pain, bruising, bleeding, infection, flu-like symptoms, over-weakening of injected or adjacent muscles and swallowing dysfunction. Patient was given the botulinum toxin medication guide according to FDA standards. Technique: Botox A 200 unit was dissolved into non-preservative normal saline 4 ml. Gauge 30 facial needles were used for the injection. Procedure: Five units/ 0.1 ml per site unless specified. Occipitalis R3 L3. Cervical paraspinal R2 L2. Trapezius R3 L3. Temporalis R4+1 L4+1. Frontalis R2+1 L2+1. Procerus 1. Corrugators R1 L1. Total injected: 175 units BOTOX 100units/2 cc. Waste: 25 units     Comments: There were no complications. The patient tolerated the procedure well. Signature: Calista Cisneros MD  Date: 12/1/22  [address] [place] [address] Neurology   [address]  Ph: [phone]  Fax: [phone]       I have personally interviewed and examined Mrs. Leslie Jefferson and I have personally reviewed all relevant records including labs and imaging as noted above. I have written all aspects of this note. More than 50% of this time was used for counseling regarding my diagnosis, prognosis, and plans for management.    Procedure Time: 10 minutes  Total visit time (including procedures): N/A

## 2022-12-02 PROBLEM — R23.2 HOT FLASHES: Status: ACTIVE | Noted: 2022-12-02

## 2022-12-12 ENCOUNTER — OFFICE VISIT (OUTPATIENT)
Dept: UROLOGY | Age: 47
End: 2022-12-12
Payer: MEDICARE

## 2022-12-12 DIAGNOSIS — R33.9 RETENTION OF URINE, UNSPECIFIED: Primary | ICD-10-CM

## 2022-12-12 LAB
BILIRUBIN, URINE, POC: NEGATIVE
BLOOD URINE, POC: NEGATIVE
GLUCOSE URINE, POC: NEGATIVE
KETONES, URINE, POC: NEGATIVE
LEUKOCYTE ESTERASE, URINE, POC: NEGATIVE
NITRITE, URINE, POC: NEGATIVE
PH, URINE, POC: 5.5 (ref 4.6–8)
PROTEIN,URINE, POC: NEGATIVE
SPECIFIC GRAVITY, URINE, POC: 1.01 (ref 1–1.03)
URINALYSIS CLARITY, POC: NORMAL
URINALYSIS COLOR, POC: NORMAL
UROBILINOGEN, POC: NORMAL

## 2022-12-12 PROCEDURE — 99213 OFFICE O/P EST LOW 20 MIN: CPT | Performed by: NURSE PRACTITIONER

## 2022-12-12 PROCEDURE — 81003 URINALYSIS AUTO W/O SCOPE: CPT | Performed by: NURSE PRACTITIONER

## 2022-12-12 ASSESSMENT — ENCOUNTER SYMPTOMS
NAUSEA: 0
BACK PAIN: 0

## 2022-12-12 NOTE — PROGRESS NOTES
[facility] [address] [place] [address] [address]  [phone]          Leonid Lorenzana  : 1975    Chief Complaint   Patient presents with    Follow-up          HPI     Antonio Villarreal is a 50 y.o. female    Here today for follow up on urinary retention. She has not been seen since 2021. She says her PCP has been prescribing the Flomax. She has been voiding without any difficulty. Recently she has been traveling back and forth to the [facility] to help care for her grandmother. Over the last couple months she has found that she is having some urinary dribbling and feeling of urinary frequency and urgency. At times she feels that she is not completely emptying the bladder. PVR today reveals 184mls. She feels like she is back to her baseline     She has had back surgery since her last visit. History as copied from Previous note. Initially she was seen as an ER follow up after an episode of urinary retention. Catheter was inserted 21 in the ER after pt had not voided for 24 hours. 900mls was drained from the bladder. Pt tells me this has happened before and she has seen the [facility] urology in the past ([facility]). She has had numerous test done. She took flomax for some time, but has not recently been on this medication. She say that she was told a surgery to her \"bladder flap\" would fix her problem. Over all she thinks she has been voiding well up until a week ago. Last visit with [facility] urology was . Note from there reports that She was last seen by urology on 14 by Dr. Tony Schroeder for urinary retention. Per Dr. Anabel Christensen previous note, MRI was performed to evaluate urethral diverticulum which did not show any evidence of urethral mass. Cystoscopy was normal and UDS showed an obstructive process on 9/3/14. She was started on Flomax by NP on 14 and was subsequently lost to follow up.  She has not been taking her Flomax. Today the patient presents in hospital follow-up for catheter removal with voiding trial. She presented to [facility] emergency room on 4/27/17 with complaints of difficulty urinating. Bladder scan showed >600 mL urine in the bladder and a [facility] was placed at that time. Patient was discharged with urology follow-up. 8 pages of outside ER records personally reviewed today. She admits to a 30 years history of smoking up to 3 packs/day = 90 pyhx, now down to 1/2 ppd. No hematuria hx.           Past Medical History:   Diagnosis Date    Aneurysm ([address]     Aorta aneurysm measured 4.8/4.7cm on 10/06/2021, monitoring at present time    Bicuspid aortic valve     CAD (coronary artery disease)     Echo on 11/21/2018 showed  EF 50-55%    Chronic pain 3/14/2016    Cigarette smoker 11/22/2016    Controlled type 2 diabetes mellitus without complication, with long-term current use of insulin ([address] 11/12/2018    Fatty liver 7/28/2022    Hypercholesterolemia 11/12/2018    Hypertension     manged with med    Insomnia 3/14/2016    Obesity (BMI 30-39.9) 3/14/2016    Psychiatric disorder     anxiety , ADHD    Type 2 diabetes mellitus ([address] 1/28/2022    Type 2 diabetes mellitus with peripheral vascular disease ([address] 11/4/2019    no med at present time (02/2022) corrected with weight loss    Unspecified adverse effect of anesthesia     high anxiety after surgery, hiper     Past Surgical History:   Procedure Laterality Date    COLONOSCOPY N/A 4/28/2016    COLONOSCOPY  BMI 30 ROOM 603 performed by Uriel Mckenna MD at [facility] ENDOSCOPY    HYSTERECTOMY (CERVIX STATUS UNKNOWN)       Current Outpatient Medications   Medication Sig Dispense Refill    Handicap Placard MISC by Does not apply route M54.9, G89.29, M48.062 1 each 0    cloNIDine (CATAPRES) 0.1 MG tablet Take 0.5 tablets by mouth 2 times daily 60 tablet 0    ondansetron (ZOFRAN-ODT) 4 MG disintegrating tablet Take 1 tablet by mouth every 12 hours as needed for Nausea or Vomiting 60 tablet 0    tamsulosin (FLOMAX) 0.4 MG capsule Take 1 capsule by mouth in the morning and at bedtime 180 capsule 3    Onabotulinumtoxin A (BOTOX) 200 units injection Inject 200 Units into the muscle every 3 months 1 each 3    divalproex (DEPAKOTE ER) 500 MG extended release tablet Take 2 tablets by mouth at bedtime 60 tablet 3    divalproex (DEPAKOTE ER) 250 MG extended release tablet Take 1 tablet by mouth at bedtime Take with the two 500 mg tablets to equal 1250 mg Q HS. 30 tablet 3    clonazePAM (KLONOPIN) 1 MG tablet Take 1 tablet by mouth 2 times daily as needed for Anxiety for up to 30 days. 60 tablet 2    zolpidem (AMBIEN) 10 MG tablet Take 1 tablet by mouth nightly as needed for Sleep for up to 30 days. 30 tablet 2    topiramate (TOPAMAX) 25 MG tablet Take 75 mg by mouth 2 times daily (with meals) 180 tablet 5    lisinopril (PRINIVIL;ZESTRIL) 20 MG tablet Take 1 tablet by mouth in the morning. 30 tablet 5    omeprazole (PRILOSEC) 40 MG delayed release capsule Take 1 capsule by mouth in the morning. 30 capsule 5    tamsulosin (FLOMAX) 0.4 MG capsule Take 1 capsule by mouth in the morning. 30 capsule 5    gabapentin (NEURONTIN) 300 MG capsule Take 2 capsules by mouth in the morning and 2 capsules at noon and 2 capsules before bedtime. Do all this for 180 days. 180 capsule 5    cyanocobalamin 100 MCG tablet Take 100 mcg by mouth daily      naloxone 4 MG/0.1ML LIQD nasal spray Use 1 spray intranasally, then discard. Repeat with new spray every 2 min as needed for opioid overdose symptoms, alternating nostrils. No current facility-administered medications for this visit.      No Known Allergies  Social History     Socioeconomic History    Marital status:      Spouse name: Not on file    Number of children: Not on file    Years of education: Not on file    Highest education level: Not on file   Occupational History    Not on file   Tobacco Use    Smoking status: Former     Packs/day: 0.50     Types: Cigarettes     Quit date: 2018     Years since quittin.0    Smokeless tobacco: Never    Tobacco comments:     Quit smokin yrPPD   Substance and Sexual Activity    Alcohol use: No     Alcohol/week: 0.0 standard drinks    Drug use: No    Sexual activity: Not on file   Other Topics Concern    Not on file   Social History Narrative    , not working. 2 children, 5 grandchildren     Social Determinants of Health     Financial Resource Strain: Medium Risk    Difficulty of Paying Living Expenses: Somewhat hard   Food Insecurity: No Food Insecurity    Worried About Running Out of Food in the Last Year: Never true    Ran Out of Food in the Last Year: Never true   Transportation Needs: No Transportation Needs    Lack of Transportation (Medical): No    Lack of Transportation (Non-Medical): No   Physical Activity: Not on file   Stress: Not on file   Social Connections: Not on file   Intimate Partner Violence: Not on file   Housing Stability: Not on file     Family History   Problem Relation Age of Onset    Other Mother         bipolar disorder    COPD Mother     High Cholesterol Mother     Diabetes Father     Hypertension Father     Diabetes Sister     Hypertension Sister     Cancer Father     Breast Cancer Neg Hx        Review of Systems  Constitutional:   Negative for fever. GI:  Negative for nausea. Musculoskeletal:  Negative for back pain.     Urinalysis  UA - Dipstick  Results for orders placed or performed in visit on 22   AMB POC URINALYSIS DIP STICK AUTO W/O MICRO   Result Value Ref Range    Color (UA POC)      Clarity (UA POC)      Glucose, Urine, POC Negative Negative    Bilirubin, Urine, POC Negative Negative    KETONES, Urine, POC Negative Negative    Specific Gravity, Urine, POC 1.015 1.001 - 1.035    Blood (UA POC) Negative Negative    pH, Urine, POC 5.5 4.6 - 8.0    Protein, Urine, POC Negative Negative    Urobilinogen, POC 0.2 mg/dL     Nitrite, Urine, POC Negative Negative Leukocyte Esterase, Urine, POC Negative Negative       PHYSICAL EXAM    GENERAL: No acute distress, Awake, Alert, Oriented X 3, Gait normal  ABDOMEN: soft, non tender, non-distended, positive bowel sounds, no organomegaly, no palpable masses, no guarding, no rebound tenderness  SKIN: No rash, no erythema, no lacerations or abrasions, no ecchymosis  MUSCULOSKELETAL - MAEW, no edema       Assessment and Plan    ICD-10-CM    1. Retention of urine, unspecified  R33.9 AMB POC URINALYSIS DIP STICK AUTO W/O MICRO        PLAN:  Doing well with Flomax 0.4 mg daily  PVR is stable  Follow-up in 6 months  Sooner appointment if any retention is suspected. Consider urodynamic testing if symptoms worsen. ERIK Martinez - JOSIAS Ro is supervising physician today and he approves plan of care. Return in about 6 months (around 6/12/2023) for for recheck. Elements of this note have been dictated using speech recognition software. Although reviewed, errors of speech recognition may have occurred.

## 2023-01-24 ENCOUNTER — OFFICE VISIT (OUTPATIENT)
Dept: NEUROLOGY | Age: 48
End: 2023-01-24
Payer: MEDICARE

## 2023-01-24 VITALS
WEIGHT: 162 LBS | BODY MASS INDEX: 25.43 KG/M2 | HEIGHT: 67 IN | SYSTOLIC BLOOD PRESSURE: 110 MMHG | DIASTOLIC BLOOD PRESSURE: 70 MMHG

## 2023-01-24 DIAGNOSIS — G43.709 CHRONIC MIGRAINE WITHOUT AURA WITHOUT STATUS MIGRAINOSUS, NOT INTRACTABLE: Primary | ICD-10-CM

## 2023-01-24 PROCEDURE — 99214 OFFICE O/P EST MOD 30 MIN: CPT | Performed by: PSYCHIATRY & NEUROLOGY

## 2023-01-24 PROCEDURE — 3074F SYST BP LT 130 MM HG: CPT | Performed by: PSYCHIATRY & NEUROLOGY

## 2023-01-24 PROCEDURE — 3078F DIAST BP <80 MM HG: CPT | Performed by: PSYCHIATRY & NEUROLOGY

## 2023-01-24 RX ORDER — ATOGEPANT 60 MG/1
TABLET ORAL
Qty: 30 TABLET | Refills: 11 | Status: SHIPPED | OUTPATIENT
Start: 2023-01-24

## 2023-01-24 ASSESSMENT — ENCOUNTER SYMPTOMS
EYES NEGATIVE: 1
RESPIRATORY NEGATIVE: 1
ALLERGIC/IMMUNOLOGIC NEGATIVE: 1
GASTROINTESTINAL NEGATIVE: 1
BACK PAIN: 1

## 2023-01-24 NOTE — PROGRESS NOTES
[address], [address] [facility]  Phone: [phone] Fax [phone]  Dr. Bart Christian      1/24/2023  [address] [facility]     Patient is referred by the following provider for consultation regarding as below:       I reviewed the available records and notes and have examined patient with the following findings:     Chief Complaint:  Chief Complaint   Patient presents with    Migraine          HPI: This is a right handed 50 y.o.  female who is very pleasant very appropriate patient who unfortunately does suffer from chronic migraine headaches where they are all over her head. Throbbing pounding sharp shooting dull achy severe nausea but no vomiting photophobia and phonophobia associated with her headaches. That we have been treating her for couple years now and nothing is really helped decrease the frequency which is every day. Botox which she is receiving on a regular basis helps significantly with the intensity of her headaches. We have already tried her on Elavil Ajovy over-the-counter medicines gabapentin Depakote and Topamax. Prophylactically we have done just about everything we can find. She is using a cane at the moment because she had a failed lower lumbar back surgery that the best she describes. Botox does lower the intensity pretty significantly. But she does not feel her Topamax at 25 mg 3 twice a day is done anything for her. She has not tried Botox as of yet. IMAGING REVIEW:  I REVIEWED PERTINENT  IMAGES AND REPORTS WITH THE PATIENT PERSONALLY, DIRECTLY AND FULLY.      Past Medical History:  Past Medical History:   Diagnosis Date    Aneurysm ([address]     Aorta aneurysm measured 4.8/4.7cm on 10/06/2021, monitoring at present time    Bicuspid aortic valve     CAD (coronary artery disease)     Echo on 11/21/2018 showed  EF 50-55%    Chronic pain 3/14/2016    Cigarette smoker 11/22/2016    Controlled type 2 diabetes mellitus without complication, with long-term current use of insulin ([facility] 2018    Fatty liver 2022    Hypercholesterolemia 2018    Hypertension     manged with med    Insomnia 3/14/2016    Obesity (BMI 30-39.9) 3/14/2016    Psychiatric disorder     anxiety , ADHD    Type 2 diabetes mellitus ([facility] 2022    Type 2 diabetes mellitus with peripheral vascular disease ([facility] 2019    no med at present time (2022) corrected with weight loss    Unspecified adverse effect of anesthesia     high anxiety after surgery, hiper       Past Surgical History:  Past Surgical History:   Procedure Laterality Date    COLONOSCOPY N/A 2016    COLONOSCOPY  BMI 30 ROOM 603 performed by Kenn Navarro MD at [facility] ENDOSCOPY    HYSTERECTOMY (CERVIX STATUS UNKNOWN)         Social History:  Social History     Socioeconomic History    Marital status:      Spouse name: Not on file    Number of children: Not on file    Years of education: Not on file    Highest education level: Not on file   Occupational History    Not on file   Tobacco Use    Smoking status: Former     Packs/day: 0.50     Types: Cigarettes     Quit date: 2018     Years since quittin.1    Smokeless tobacco: Never    Tobacco comments:     Quit smokin yrPPD   Substance and Sexual Activity    Alcohol use: No     Alcohol/week: 0.0 standard drinks    Drug use: No    Sexual activity: Not on file   Other Topics Concern    Not on file   Social History Narrative    , not working. 2 children, 5 grandchildren     Social Determinants of Health     Financial Resource Strain: Medium Risk    Difficulty of Paying Living Expenses: Somewhat hard   Food Insecurity: No Food Insecurity    Worried About Running Out of Food in the Last Year: Never true    Ran Out of Food in the Last Year: Never true   Transportation Needs: No Transportation Needs    Lack of Transportation (Medical): No    Lack of Transportation (Non-Medical):  No   Physical Activity: Not on file Stress: Not on file   Social Connections: Not on file   Intimate Partner Violence: Not on file   Housing Stability: Not on file       Family History:   Family History   Problem Relation Age of Onset    Other Mother         bipolar disorder    COPD Mother     High Cholesterol Mother     Diabetes Father     Hypertension Father     Diabetes Sister     Hypertension Sister     Cancer Father     Breast Cancer Neg Hx        Current Outpatient Medications on File Prior to Visit   Medication Sig Dispense Refill    Handicap Placard MISC by Does not apply route M54.9, G89.29, M48.062 1 each 0    cloNIDine (CATAPRES) 0.1 MG tablet Take 0.5 tablets by mouth 2 times daily 60 tablet 0    tamsulosin (FLOMAX) 0.4 MG capsule Take 1 capsule by mouth in the morning and at bedtime 180 capsule 3    Onabotulinumtoxin A (BOTOX) 200 units injection Inject 200 Units into the muscle every 3 months 1 each 3    divalproex (DEPAKOTE ER) 500 MG extended release tablet Take 2 tablets by mouth at bedtime 60 tablet 3    divalproex (DEPAKOTE ER) 250 MG extended release tablet Take 1 tablet by mouth at bedtime Take with the two 500 mg tablets to equal 1250 mg Q HS. 30 tablet 3    clonazePAM (KLONOPIN) 1 MG tablet Take 1 tablet by mouth 2 times daily as needed for Anxiety for up to 30 days. 60 tablet 2    zolpidem (AMBIEN) 10 MG tablet Take 1 tablet by mouth nightly as needed for Sleep for up to 30 days. 30 tablet 2    topiramate (TOPAMAX) 25 MG tablet Take 75 mg by mouth 2 times daily (with meals) 180 tablet 5    lisinopril (PRINIVIL;ZESTRIL) 20 MG tablet Take 1 tablet by mouth in the morning. 30 tablet 5    omeprazole (PRILOSEC) 40 MG delayed release capsule Take 1 capsule by mouth in the morning. 30 capsule 5    tamsulosin (FLOMAX) 0.4 MG capsule Take 1 capsule by mouth in the morning. 30 capsule 5    gabapentin (NEURONTIN) 300 MG capsule Take 2 capsules by mouth in the morning and 2 capsules at noon and 2 capsules before bedtime.  Do all this for 180 days. 180 capsule 5    ondansetron (ZOFRAN-ODT) 4 MG disintegrating tablet Take 1 tablet by mouth every 12 hours as needed for Nausea or Vomiting (Patient not taking: Reported on 1/24/2023) 60 tablet 0    cyanocobalamin 100 MCG tablet Take 100 mcg by mouth daily (Patient not taking: Reported on 1/24/2023)      naloxone 4 MG/0.1ML LIQD nasal spray Use 1 spray intranasally, then discard. Repeat with new spray every 2 min as needed for opioid overdose symptoms, alternating nostrils. (Patient not taking: Reported on 1/24/2023)       No current facility-administered medications on file prior to visit. No Known Allergies    Review of Systems:  Review of Systems   Constitutional: Negative. HENT: Negative. Eyes: Negative. Respiratory: Negative. Cardiovascular: Negative. Gastrointestinal: Negative. Endocrine: Negative. Genitourinary: Negative. Musculoskeletal:  Positive for back pain. Allergic/Immunologic: Negative. Neurological:  Positive for headaches. No flowsheet data found. No flowsheet data found. Vitals:    01/24/23 1422   BP: 110/70   Weight: 162 lb (73.5 kg)   Height: 5' 7\" (1.702 m)        Physical Exam  Constitutional:       Appearance: Normal appearance. HENT:      Head: Normocephalic and atraumatic. Eyes:      Extraocular Movements: Extraocular movements intact and EOM normal.      Pupils: Pupils are equal, round, and reactive to light. Cardiovascular:      Rate and Rhythm: Normal rate and regular rhythm. Pulses: Normal pulses. Pulmonary:      Effort: Pulmonary effort is normal.   Abdominal:      General: Abdomen is flat. Neurological:      Mental Status: She is alert and oriented to person, place, and time. Deep Tendon Reflexes:      Reflex Scores:       Tricep reflexes are 1+ on the right side and 1+ on the left side. Bicep reflexes are 1+ on the right side and 1+ on the left side.        Brachioradialis reflexes are 1+ on the right side and 1+ on the left side. Patellar reflexes are 1+ on the right side and 1+ on the left side. Achilles reflexes are 1+ on the right side and 1+ on the left side. Neurologic Exam     Mental Status   Oriented to person, place, and time. Attention: normal. Concentration: normal.   Level of consciousness: alert  Knowledge: good. Cranial Nerves     CN II   Visual fields full to confrontation. CN III, IV, VI   Pupils are equal, round, and reactive to light. Extraocular motions are normal.     CN VII   Facial expression full, symmetric. Motor Exam She has severe range of motion of her lumbar spine with rotation and sidebending and flexion     Gait, Coordination, and Reflexes     Tremor   Resting tremor: absent  Intention tremor: absent  Action tremor: absent    Reflexes   Right brachioradialis: 1+  Left brachioradialis: 1+  Right biceps: 1+  Left biceps: 1+  Right triceps: 1+  Left triceps: 1+  Right patellar: 1+  Left patellar: 1+  Right achilles: 1+  Left achilles: 1+She uses a cane to ambulate she has significant range of motion loss of her lumbar spine post surgery from a year ago. Assessment   Assessment / Plan:    Himanshu Stevens was seen today for migraine. Diagnoses and all orders for this visit:    Chronic migraine without aura without status migrainosus, not intractable at this time the patient still has daily headaches every day. Throbbing pounding sharp shooting nausea photophobia phonophobia without vomiting. She is at this point working to try her on Qulipta 60 mg a day. Along with her Botox she is can continue. And where to wean her down off of her Topamax because it did not seem to be doing anything. The Diagnosis and differential diagnostic considerations, and Rx Tx were reviewed with the patient at length. No orders of the defined types were placed in this encounter.          I have spent greater than 50% of visit discussing and counseling of patient  for treatment and diagnostic plan review. Total time30 min     . Notes: Patient is to continue all medications as directed by prescribing physicians. Continuations on today's visit are made based on the patient's report of current medications.              Dr. Derrick Fields  Consultation Neurology, Neurodiagnostics and Neurotherapeutics  Neuroelectrophysiology, EEG, EMG  [facility] Neu[facility]  [address] [place] [address], [address] [facility]  Phone:  [phone]  Fax:   [phone]

## 2023-01-26 ENCOUNTER — TELEPHONE (OUTPATIENT)
Dept: NEUROLOGY | Age: 48
End: 2023-01-26

## 2023-01-26 RX ORDER — PROPRANOLOL HCL 60 MG
60 CAPSULE, EXTENDED RELEASE 24HR ORAL DAILY
Qty: 90 CAPSULE | Refills: 3 | Status: SHIPPED | OUTPATIENT
Start: 2023-01-26

## 2023-01-26 NOTE — PROGRESS NOTES
I I spoke with the patient. Jin Choi is not going to get approved by her insurance but she can finish off the samples that we gave her to see if it will help. Were also going to change her to a long-acting beta-blocker if she gets lightheaded she will stop it she is going to be on Inderal long-acting 60 mg a day. Her insurance will cover the other 2 injectables Emgality and Aimovig but she already failed Ajovy so does not make sense. She is getting continue with Botox.

## 2023-01-27 ENCOUNTER — TELEMEDICINE (OUTPATIENT)
Dept: BEHAVIORAL/MENTAL HEALTH CLINIC | Age: 48
End: 2023-01-27
Payer: MEDICARE

## 2023-01-27 DIAGNOSIS — F41.1 GAD (GENERALIZED ANXIETY DISORDER): Primary | ICD-10-CM

## 2023-01-27 DIAGNOSIS — F31.75 BIPOLAR 1 DISORDER, DEPRESSED, PARTIAL REMISSION (HCC): ICD-10-CM

## 2023-01-27 DIAGNOSIS — F51.04 INSOMNIA, PSYCHOPHYSIOLOGICAL: ICD-10-CM

## 2023-01-27 PROCEDURE — 99215 OFFICE O/P EST HI 40 MIN: CPT | Performed by: NURSE PRACTITIONER

## 2023-01-27 RX ORDER — DIVALPROEX SODIUM 500 MG/1
1000 TABLET, EXTENDED RELEASE ORAL NIGHTLY
Qty: 60 TABLET | Refills: 3 | Status: SHIPPED | OUTPATIENT
Start: 2023-01-27 | End: 2023-02-26

## 2023-01-27 RX ORDER — ZOLPIDEM TARTRATE 10 MG/1
10 TABLET ORAL NIGHTLY PRN
Qty: 30 TABLET | Refills: 2 | Status: SHIPPED | OUTPATIENT
Start: 2023-02-19 | End: 2023-03-21

## 2023-01-27 RX ORDER — DIVALPROEX SODIUM 250 MG/1
250 TABLET, EXTENDED RELEASE ORAL NIGHTLY
Qty: 30 TABLET | Refills: 3 | Status: SHIPPED | OUTPATIENT
Start: 2023-01-27 | End: 2023-02-26

## 2023-01-27 RX ORDER — CLONAZEPAM 1 MG/1
1 TABLET ORAL 2 TIMES DAILY PRN
Qty: 60 TABLET | Refills: 2 | Status: SHIPPED | OUTPATIENT
Start: 2023-02-19 | End: 2023-03-21

## 2023-01-27 ASSESSMENT — PATIENT HEALTH QUESTIONNAIRE - PHQ9
SUM OF ALL RESPONSES TO PHQ QUESTIONS 1-9: 0
SUM OF ALL RESPONSES TO PHQ9 QUESTIONS 1 & 2: 0
2. FEELING DOWN, DEPRESSED OR HOPELESS: 0
SUM OF ALL RESPONSES TO PHQ QUESTIONS 1-9: 0
SUM OF ALL RESPONSES TO PHQ QUESTIONS 1-9: 0
1. LITTLE INTEREST OR PLEASURE IN DOING THINGS: 0
SUM OF ALL RESPONSES TO PHQ QUESTIONS 1-9: 0

## 2023-01-27 NOTE — PROGRESS NOTES
OUTPATIENT PSYCHIATRIC RETURN VISIT PROGRESS NOTE    I was in the office While conducting this encounter. Pt was at home    She and/ or her healthcare decision maker is aware that this patient-initialed Telehealth encounter is a billable service with coverage as determined by her insurance carrier. She is aware that she may receive a bill and has provided verbal consent to proceed: Yes    The Virtual visit was conducted via [address] Pursuant to the emergency declaration under the [address] [facility] waiver authority and the [employer] Act, this Virtual  Visit was conducted to reduce the patient's risk of exposure to COVID-19 and provide continuity of care for an established patient. Services were provided through a video synchronous discussion virtually to substitute for in-person clinic visit. Due to this being a TeleHealth evaluation, many elements of the physical examination are unable to be assessed. Total Time:  minutes: 40-54 minutes         Date of Service: 1/27/2023     Identification    Bisi Alberts is a 50 y.o.  with a past psychiatric history of bipolar d/o, anxiety, insomnia who presents today for a psychiatric follow up appointment. CC:  Routine medication management follow up. Chief Complaint   Patient presents with    Follow-up        Subjective / Interval History:    Pt  visit via My Chart  today and reports that she is having a lot of situational stressors. Daughter who was incarcerated for 106 days is out and has moved back in with pt. Daughter has resumed abusing substances. Pt says having her in the home has been very stressful and that she is planning to get a camper and having her move out to the backyard. Pt attributes some increased anxiety/depression to her current situation and believes that her current medications are effective and wishes to continue.   She did obtain labs Oct 28; VPA=60 and hepatic function tests =WNL. Will continue to monitor. Pt has been medication compliant and denies any side-effects. Pt denies SI, HI and AVH. Does not endorse any manic or psychotic symptoms. Psychiatric Review Of Systems:  Sleep: generally restful sleep  Appetite: ok  Current suicidal/homicidal ideations: Denies SI/ HI  Current auditory/visual hallucinations: Denies     Medications:    Current Outpatient Medications:     propranolol (INDERAL LA) 60 MG extended release capsule, Take 1 capsule by mouth daily, Disp: 90 capsule, Rfl: 3    Atogepant (QULIPTA) 60 MG TABS, Take one a day, Disp: 30 tablet, Rfl: 11    Handicap Placard MISC, by Does not apply route M54.9, G89.29, M48.062, Disp: 1 each, Rfl: 0    cloNIDine (CATAPRES) 0.1 MG tablet, Take 0.5 tablets by mouth 2 times daily, Disp: 60 tablet, Rfl: 0    ondansetron (ZOFRAN-ODT) 4 MG disintegrating tablet, Take 1 tablet by mouth every 12 hours as needed for Nausea or Vomiting, Disp: 60 tablet, Rfl: 0    tamsulosin (FLOMAX) 0.4 MG capsule, Take 1 capsule by mouth in the morning and at bedtime, Disp: 180 capsule, Rfl: 3    Onabotulinumtoxin A (BOTOX) 200 units injection, Inject 200 Units into the muscle every 3 months, Disp: 1 each, Rfl: 3    divalproex (DEPAKOTE ER) 500 MG extended release tablet, Take 2 tablets by mouth at bedtime, Disp: 60 tablet, Rfl: 3    divalproex (DEPAKOTE ER) 250 MG extended release tablet, Take 1 tablet by mouth at bedtime Take with the two 500 mg tablets to equal 1250 mg Q HS., Disp: 30 tablet, Rfl: 3    clonazePAM (KLONOPIN) 1 MG tablet, Take 1 tablet by mouth 2 times daily as needed for Anxiety for up to 30 days. , Disp: 60 tablet, Rfl: 2    topiramate (TOPAMAX) 25 MG tablet, Take 75 mg by mouth 2 times daily (with meals), Disp: 180 tablet, Rfl: 5    lisinopril (PRINIVIL;ZESTRIL) 20 MG tablet, Take 1 tablet by mouth in the morning., Disp: 30 tablet, Rfl: 5    omeprazole (PRILOSEC) 40 MG delayed release capsule, Take 1 capsule by mouth in the morning., Disp: 30 capsule, Rfl: 5    tamsulosin (FLOMAX) 0.4 MG capsule, Take 1 capsule by mouth in the morning., Disp: 30 capsule, Rfl: 5    gabapentin (NEURONTIN) 300 MG capsule, Take 2 capsules by mouth in the morning and 2 capsules at noon and 2 capsules before bedtime. Do all this for 180 days. , Disp: 180 capsule, Rfl: 5    cyanocobalamin 100 MCG tablet, Take 100 mcg by mouth daily, Disp: , Rfl:     naloxone 4 MG/0.1ML LIQD nasal spray, Use 1 spray intranasally, then discard. Repeat with new spray every 2 min as needed for opioid overdose symptoms, alternating nostrils. , Disp: , Rfl:     zolpidem (AMBIEN) 10 MG tablet, Take 1 tablet by mouth nightly as needed for Sleep for up to 30 days. , Disp: 30 tablet, Rfl: 2       PMH:  Past Medical History:   Diagnosis Date    Aneurysm ([facility]     Aorta aneurysm measured 4.8/4.7cm on 10/06/2021, monitoring at present time    Bicuspid aortic valve     CAD (coronary artery disease)     Echo on 11/21/2018 showed  EF 50-55%    Chronic pain 3/14/2016    Cigarette smoker 11/22/2016    Controlled type 2 diabetes mellitus without complication, with long-term current use of insulin ([address] 11/12/2018    Fatty liver 7/28/2022    Hypercholesterolemia 11/12/2018    Hypertension     manged with med    Insomnia 3/14/2016    Obesity (BMI 30-39.9) 3/14/2016    Psychiatric disorder     anxiety , ADHD    Type 2 diabetes mellitus ([facility] 1/28/2022    Type 2 diabetes mellitus with peripheral vascular disease ([address] 11/4/2019    no med at present time (02/2022) corrected with weight loss    Unspecified adverse effect of anesthesia     high anxiety after surgery, hiper       Vitals: There were no vitals filed for this visit.     ROS:  Psychological ROS: positive for anxiety, depression, and sleep disturbance    Mental Status Exam:   Appearance  Appears stated age, Well-kept, Appropriately attired, Well-nourished, Cooperative, and Maintains eye-contact  Behavior  Cooperative and Pleasant  Psychomotor Activity within normal limits  Gait and Station Normal Laina and Station and Normal Balance  Speech   appropriate  Mood    is euthymic and is sad  Affect    calm  Thought Process Circumstantial  Thought Content/Perceptual Disturbances free of delusions, free of hallucinations, and not internally preoccupied  Cognition/Sensorium Alert, Fully oriented, Normal concentration/ Attention, Recent memory intact, Remote memory intact, and Fund of knowledge adequate for level of education  Insight  fair  Judgment fair  Assessment:    Isidro Hatchet was seen today for follow-up. Diagnoses and all orders for this visit:    JUS (generalized anxiety disorder)  -     clonazePAM (KLONOPIN) 1 MG tablet; Take 1 tablet by mouth 2 times daily as needed for Anxiety for up to 30 days. Bipolar 1 disorder, depressed, partial remission (HCC)    Insomnia, psychophysiological  -     zolpidem (AMBIEN) 10 MG tablet; Take 1 tablet by mouth nightly as needed for Sleep for up to 30 days. Other orders  -     divalproex (DEPAKOTE ER) 250 MG extended release tablet; Take 1 tablet by mouth at bedtime Take with the two 500 mg tablets to equal 1250 mg Q HS.  -     divalproex (DEPAKOTE ER) 500 MG extended release tablet; Take 2 tablets by mouth at bedtime       Patient Education and Counseling:  Supportive therapy provided for identified psychosocial stressors. Medication education provided and decisions regarding medication regimen discussed with patient. Plan:  -  Continue Depakote ER 1250 mg Q HS. Continue zolpidem 10 mg HS prn and Klonopin 1 mg bid prn anxiety. -  Crisis plan reviewed and patient verbally contracts for safety. Go to ED with emergent symptoms or safety concerns.  -  Risks, benefits, side effects of medications, including any / all black box warnings, discussed with patient, who verbalizes their understanding.     - [employer] web site checked for controlled substances.      Disposition:  home    Follow Up:  Return in 3 months, or call in the interim for any side-effects, decompensation, questions, or problems between now and the next visit. Return in about 3 months (around 4/27/2023) for Follow up.        42 minutes face to face with the patient with more than 50% of the total time spent on education, counseling, & coordination of care of the patient regarding ongoing psychiatric illness.         [address] [facility] [address]

## 2023-02-17 ENCOUNTER — OFFICE VISIT (OUTPATIENT)
Dept: FAMILY MEDICINE CLINIC | Facility: CLINIC | Age: 48
End: 2023-02-17

## 2023-02-17 VITALS
BODY MASS INDEX: 27.48 KG/M2 | DIASTOLIC BLOOD PRESSURE: 64 MMHG | WEIGHT: 171 LBS | SYSTOLIC BLOOD PRESSURE: 106 MMHG | HEIGHT: 66 IN | HEART RATE: 90 BPM | OXYGEN SATURATION: 97 %

## 2023-02-17 DIAGNOSIS — Z71.89 ADVANCED CARE PLANNING/COUNSELING DISCUSSION: ICD-10-CM

## 2023-02-17 DIAGNOSIS — Z00.00 MEDICARE ANNUAL WELLNESS VISIT, SUBSEQUENT: ICD-10-CM

## 2023-02-17 DIAGNOSIS — J30.9 ALLERGIC RHINITIS, UNSPECIFIED SEASONALITY, UNSPECIFIED TRIGGER: ICD-10-CM

## 2023-02-17 DIAGNOSIS — Z86.39 HISTORY OF DIABETES MELLITUS, TYPE II: Primary | ICD-10-CM

## 2023-02-17 DIAGNOSIS — E78.00 ELEVATED LDL CHOLESTEROL LEVEL: ICD-10-CM

## 2023-02-17 DIAGNOSIS — N95.1 VASOMOTOR SYMPTOMS DUE TO MENOPAUSE: ICD-10-CM

## 2023-02-17 DIAGNOSIS — I10 ESSENTIAL HYPERTENSION: ICD-10-CM

## 2023-02-17 LAB
ANION GAP SERPL CALC-SCNC: 5 MMOL/L (ref 2–11)
BUN SERPL-MCNC: 30 MG/DL (ref 6–23)
CALCIUM SERPL-MCNC: 9.3 MG/DL (ref 8.3–10.4)
CHLORIDE SERPL-SCNC: 112 MMOL/L (ref 101–110)
CHOLEST SERPL-MCNC: 208 MG/DL
CO2 SERPL-SCNC: 25 MMOL/L (ref 21–32)
CREAT SERPL-MCNC: 0.9 MG/DL (ref 0.6–1)
CREAT UR-MCNC: 70 MG/DL
ERYTHROCYTE [DISTWIDTH] IN BLOOD BY AUTOMATED COUNT: 13.1 % (ref 11.9–14.6)
GLUCOSE SERPL-MCNC: 93 MG/DL (ref 65–100)
HBA1C MFR BLD: 5.3 %
HCT VFR BLD AUTO: 42.3 % (ref 35.8–46.3)
HDLC SERPL-MCNC: 43 MG/DL (ref 40–60)
HDLC SERPL: 4.8
HGB BLD-MCNC: 13.5 G/DL (ref 11.7–15.4)
LDLC SERPL CALC-MCNC: 126 MG/DL
MCH RBC QN AUTO: 35.1 PG (ref 26.1–32.9)
MCHC RBC AUTO-ENTMCNC: 31.9 G/DL (ref 31.4–35)
MCV RBC AUTO: 109.9 FL (ref 82–102)
MICROALBUMIN UR-MCNC: 1.43 MG/DL (ref 0–3)
MICROALBUMIN/CREAT UR-RTO: 20 MG/G (ref 0–30)
NRBC # BLD: 0 K/UL (ref 0–0.2)
PLATELET # BLD AUTO: 89 K/UL (ref 150–450)
PMV BLD AUTO: 12.5 FL (ref 9.4–12.3)
POTASSIUM SERPL-SCNC: 4.6 MMOL/L (ref 3.5–5.1)
RBC # BLD AUTO: 3.85 M/UL (ref 4.05–5.2)
SODIUM SERPL-SCNC: 142 MMOL/L (ref 133–143)
TRIGL SERPL-MCNC: 195 MG/DL (ref 35–150)
VLDLC SERPL CALC-MCNC: 39 MG/DL (ref 6–23)
WBC # BLD AUTO: 5.8 K/UL (ref 4.3–11.1)

## 2023-02-17 RX ORDER — CETIRIZINE HYDROCHLORIDE 10 MG/1
10 TABLET ORAL DAILY
Qty: 30 TABLET | Refills: 0 | Status: SHIPPED | OUTPATIENT
Start: 2023-02-17 | End: 2023-03-19

## 2023-02-17 RX ORDER — FLUTICASONE PROPIONATE 50 MCG
2 SPRAY, SUSPENSION (ML) NASAL DAILY
Qty: 16 G | Refills: 0 | Status: SHIPPED | OUTPATIENT
Start: 2023-02-17

## 2023-02-17 RX ORDER — CLONIDINE HYDROCHLORIDE 0.1 MG/1
0.1 TABLET ORAL 2 TIMES DAILY
Qty: 60 TABLET | Refills: 8 | Status: SHIPPED | OUTPATIENT
Start: 2023-02-17

## 2023-02-17 SDOH — ECONOMIC STABILITY: INCOME INSECURITY: HOW HARD IS IT FOR YOU TO PAY FOR THE VERY BASICS LIKE FOOD, HOUSING, MEDICAL CARE, AND HEATING?: NOT HARD AT ALL

## 2023-02-17 SDOH — ECONOMIC STABILITY: FOOD INSECURITY: WITHIN THE PAST 12 MONTHS, THE FOOD YOU BOUGHT JUST DIDN'T LAST AND YOU DIDN'T HAVE MONEY TO GET MORE.: NEVER TRUE

## 2023-02-17 SDOH — ECONOMIC STABILITY: HOUSING INSECURITY
IN THE LAST 12 MONTHS, WAS THERE A TIME WHEN YOU DID NOT HAVE A STEADY PLACE TO SLEEP OR SLEPT IN A SHELTER (INCLUDING NOW)?: NO

## 2023-02-17 SDOH — ECONOMIC STABILITY: FOOD INSECURITY: WITHIN THE PAST 12 MONTHS, YOU WORRIED THAT YOUR FOOD WOULD RUN OUT BEFORE YOU GOT MONEY TO BUY MORE.: NEVER TRUE

## 2023-02-17 ASSESSMENT — PATIENT HEALTH QUESTIONNAIRE - PHQ9
9. THOUGHTS THAT YOU WOULD BE BETTER OFF DEAD, OR OF HURTING YOURSELF: 0
SUM OF ALL RESPONSES TO PHQ QUESTIONS 1-9: 0
3. TROUBLE FALLING OR STAYING ASLEEP: 0
1. LITTLE INTEREST OR PLEASURE IN DOING THINGS: 0
2. FEELING DOWN, DEPRESSED OR HOPELESS: 0
SUM OF ALL RESPONSES TO PHQ QUESTIONS 1-9: 0
5. POOR APPETITE OR OVEREATING: 0
4. FEELING TIRED OR HAVING LITTLE ENERGY: 0
SUM OF ALL RESPONSES TO PHQ9 QUESTIONS 1 & 2: 0
SUM OF ALL RESPONSES TO PHQ QUESTIONS 1-9: 0
6. FEELING BAD ABOUT YOURSELF - OR THAT YOU ARE A FAILURE OR HAVE LET YOURSELF OR YOUR FAMILY DOWN: 0
10. IF YOU CHECKED OFF ANY PROBLEMS, HOW DIFFICULT HAVE THESE PROBLEMS MADE IT FOR YOU TO DO YOUR WORK, TAKE CARE OF THINGS AT HOME, OR GET ALONG WITH OTHER PEOPLE: 0
8. MOVING OR SPEAKING SO SLOWLY THAT OTHER PEOPLE COULD HAVE NOTICED. OR THE OPPOSITE, BEING SO FIGETY OR RESTLESS THAT YOU HAVE BEEN MOVING AROUND A LOT MORE THAN USUAL: 0
7. TROUBLE CONCENTRATING ON THINGS, SUCH AS READING THE NEWSPAPER OR WATCHING TELEVISION: 0
SUM OF ALL RESPONSES TO PHQ QUESTIONS 1-9: 0

## 2023-02-17 ASSESSMENT — ENCOUNTER SYMPTOMS
WHEEZING: 0
COUGH: 1
SORE THROAT: 0
SHORTNESS OF BREATH: 0
RHINORRHEA: 1
EYE ITCHING: 1

## 2023-02-17 NOTE — PATIENT INSTRUCTIONS
Preventing Falls: Care Instructions  Overview     Getting around your home safely can be a challenge if you have injuries or health problems that make it easy for you to fall. Loose rugs and furniture in walkways are among the dangers for many older people who have problems walking or who have poor eyesight. People who have conditions such as arthritis, osteoporosis, or dementia also have to be careful not to fall. You can make your home safer with a few simple measures. Follow-up care is a key part of your treatment and safety. Be sure to make and go to all appointments, and call your doctor if you are having problems. It's also a good idea to know your test results and keep a list of the medicines you take. How can you care for yourself at home? Taking care of yourself  Exercise regularly to improve your strength, muscle tone, and balance. Walk if you can. Swimming may be a good choice if you cannot walk easily. Have your vision and hearing checked each year or any time you notice a change. If you have trouble seeing and hearing, you might not be able to avoid objects and could lose your balance. Know the side effects of the medicines you take. Ask your doctor or pharmacist whether the medicines you take can affect your balance. Sleeping pills or sedatives can affect your balance. Limit the amount of alcohol you drink. Alcohol can impair your balance and other senses. Ask your doctor whether calluses or corns on your feet need to be removed. If you wear loose-fitting shoes because of calluses or corns, you can lose your balance and fall. Talk to your doctor if you have numbness in your feet. You may get dizzy if you do not drink enough water. To prevent dehydration, drink plenty of fluids. Choose water and other clear liquids. If you have kidney, heart, or liver disease and have to limit fluids, talk with your doctor before you increase the amount of fluids you drink.   Preventing falls at home  Remove raised doorway thresholds, throw rugs, and clutter. Repair loose carpet or raised areas in the floor. Move furniture and electrical cords to keep them out of walking paths. Use nonskid floor wax, and wipe up spills right away, especially on ceramic tile floors. If you use a walker or cane, put rubber tips on it. If you use crutches, clean the bottoms of them regularly with an abrasive pad, such as steel wool. Keep your house well lit, especially stairways, porches, and outside walkways. Use night-lights in areas such as hallways and bathrooms. Add extra light switches or use remote switches (such as switches that go on or off when you clap your hands) to make it easier to turn lights on if you have to get up during the night. Install sturdy handrails on stairways. Move items in your cabinets so that the things you use a lot are on the lower shelves (about waist level). Keep a cordless phone and a flashlight with new batteries by your bed. If possible, put a phone in each of the main rooms of your house, or carry a cell phone in case you fall and cannot reach a phone. Or, you can wear a device around your neck or wrist. You push a button that sends a signal for help. Wear low-heeled shoes that fit well and give your feet good support. Use footwear with nonskid soles. Check the heels and soles of your shoes for wear. Repair or replace worn heels or soles. Do not wear socks without shoes on smooth floors, such as wood. Walk on the grass when the sidewalks are slippery. If you live in an area that gets snow and ice in the winter, sprinkle salt on slippery steps and sidewalks. Or ask a family member or friend to do this for you. Preventing falls in the bath  Install grab bars and nonskid mats inside and outside your shower or tub and near the toilet and sinks. Use shower chairs and bath benches. Use a hand-held shower head that will allow you to sit while showering.   Get into a tub or shower by putting the weaker leg in first. Get out of a tub or shower with your strong side first.  Repair loose toilet seats and consider installing a raised toilet seat to make getting on and off the toilet easier. Keep your bathroom door unlocked while you are in the shower. Where can you learn more? Go to http://www.mackey.com/ and enter G117 to learn more about \"Preventing Falls: Care Instructions. \"  Current as of: May 4, 2022               Content Version: 13.5  © 7247-7086 [email or web address]. Care instructions adapted under license by [facility] [address] If you have questions about a medical condition or this instruction, always ask your healthcare professional. [place] [address] any warranty or liability for your use of this information. Hearing Loss: Care Instructions  Overview     Hearing loss is a sudden or slow decrease in how well you hear. It can range from mild to severe. Permanent hearing loss can occur with aging. It also can happen when you are exposed long-term to loud noise. Examples include listening to loud music, riding motorcycles, or being around other loud machines. Hearing loss can affect your work and home life. It can make you feel lonely or depressed. You may feel that you have lost your independence. But hearing aids and other devices can help you hear better and feel connected to others. Follow-up care is a key part of your treatment and safety. Be sure to make and go to all appointments, and call your doctor if you are having problems. It's also a good idea to know your test results and keep a list of the medicines you take. How can you care for yourself at home? Avoid loud noises whenever possible. This helps keep your hearing from getting worse. Always wear hearing protection around loud noises. Wear a hearing aid as directed. See a professional who can help you pick a hearing aid that fits you. Have hearing tests as your doctor suggests. They can show whether your hearing has changed. Your hearing aid may need to be adjusted. Use other devices as needed. These may include:  Telephone amplifiers and hearing aids that can connect to a television, stereo, radio, or microphone. Devices that use lights or vibrations. These alert you to the doorbell, a ringing telephone, or a baby monitor. Television closed-captioning. This shows the words at the bottom of the screen. Most new TVs can do this. TTY (text telephone). This lets you type messages back and forth on the telephone instead of talking or listening. These devices are also called TDD. When messages are typed on the keyboard, they are sent over the phone line to a receiving TTY. The message is shown on a monitor. Use text messaging, social media, and email if it is hard for you to communicate by telephone. Try to learn a listening technique called speechreading. It is not lipreading. You pay attention to people's gestures, expressions, posture, and tone of voice. These clues can help you understand what a person is saying. Face the person you are talking to, and have them face you. Make sure the lighting is good. You need to see the other person's face clearly. Think about counseling if you need help to adjust to your hearing loss. When should you call for help? Watch closely for changes in your health, and be sure to contact your doctor if:    You think your hearing is getting worse. You have new symptoms, such as dizziness or nausea. Where can you learn more? Go to http://www.[employer].com/ and enter R798 to learn more about \"Hearing Loss: Care Instructions. \"  Current as of: May 4, 2022               Content Version: 13.5  © 9071-9727 [employer]. Care instructions adapted under license by [facility] ([facility]).  If you have questions about a medical condition or this instruction, always ask your healthcare professional. [place] any warranty or liability for your use of this information. Advance Directives: Care Instructions  Overview  An advance directive is a legal way to state your wishes at the end of your life. It tells your family and your doctor what to do if you can't say what you want. There are two main types of advance directives. You can change them any time your wishes change. Living will. This form tells your family and your doctor your wishes about life support and other treatment. The form is also called a declaration. Medical power of . This form lets you name a person to make treatment decisions for you when you can't speak for yourself. This person is called a health care agent (health care proxy, health care surrogate). The form is also called a durable power of  for health care. If you do not have an advance directive, decisions about your medical care may be made by a family member, or by a doctor or a  who doesn't know you. It may help to think of an advance directive as a gift to the people who care for you. If you have one, they won't have to make tough decisions by themselves. For more information, including forms for your state, see the [address] website (www.caringinfo.org/planning/advance-directives/). Follow-up care is a key part of your treatment and safety. Be sure to make and go to all appointments, and call your doctor if you are having problems. It's also a good idea to know your test results and keep a list of the medicines you take. What should you include in an advance directive? Many states have a unique advance directive form. (It may ask you to address specific issues.) Or you might use a universal form that's approved by many states. If your form doesn't tell you what to address, it may be hard to know what to include in your advance directive. Use the questions below to help you get started.   Who do you want to make decisions about your medical care if you are not able to? What life-support measures do you want if you have a serious illness that gets worse over time or can't be cured? What are you most afraid of that might happen? (Maybe you're afraid of having pain, losing your independence, or being kept alive by machines.)  Where would you prefer to die? (Your home? A hospital? A nursing home?)  Do you want to donate your organs when you die? Do you want certain Orthodoxy practices performed before you die? When should you call for help? Be sure to contact your doctor if you have any questions. Where can you learn more? Go to http://www.mackey.com/ and enter R264 to learn more about \"Advance Directives: Care Instructions. \"  Current as of: June 16, 2022               Content Version: 13.5  © 7059-6052 Healthwise, Incorporated. Care instructions adapted under license by [facility] ([facility]). If you have questions about a medical condition or this instruction, always ask your healthcare professional. [place] [address] any warranty or liability for your use of this information. Learning About Benefits From Quitting Smoking  Why is it important to quit smoking? If you're thinking about quitting smoking, you may have a few reasons to be smoke-free. Your health may be one of them. When you quit smoking, you lower your risk for many serious health problems, such as cancer, lung disease, heart attack, stroke, blood vessel disease, and blindness from macular degeneration. When you're smoke-free, you get sick less often, and you heal faster. You are less likely to get colds, flu, bronchitis, and pneumonia. As a nonsmoker, you may find that your mood is better and you are less stressed. When and how will you feel healthier? Quitting has real health benefits that start from day 1 of being smoke-free. And the longer you stay smoke-free, the healthier you get and the better you feel.   The first hours or days  Soon after you stop smoking, your blood pressure and heart rate go down. That means there's less stress on your heart and blood vessels. Within days, the level of carbon monoxide in your blood drops back to normal. That makes room for more oxygen. Within weeks or months  When your lungs begin to clear, you cough less and breathe deeper, so it may be easier to be active. Your sense of taste and smell should return. That means you may enjoy food more than you have since you started smoking. Over the years  Over the years, your risks of heart disease, heart attack, and stroke are lower. After 10 years, your risk of lung cancer is cut by about half. And your risk for many other types of cancer is lower too. How would quitting help others in your life? When you quit smoking, you improve the health of everyone who now breathes in your smoke. Their heart, lung, and cancer risks may drop, much like yours. They are sick less. For babies and small children, living smoke-free means they're less likely to have ear infections, pneumonia, and bronchitis. If you are or will be pregnant someday, quitting smoking means a healthier . Children who are close to you are less likely to become adult smokers. Where can you learn more? Go to http://www.woods.com/ and enter O319 to learn more about \"Learning About Benefits From Quitting Smoking. \"  Current as of: 2021               Content Version: 13.5  © 0962-1411 Healthwise, Incorporated. Care instructions adapted under license by [facility] ([facility]). If you have questions about a medical condition or this instruction, always ask your healthcare professional. [place] [address] any warranty or liability for your use of this information.            A Healthy Heart: Care Instructions  Your Care Instructions     Coronary artery disease, also called heart disease, occurs when a substance called plaque builds up in the vessels that supply oxygen-rich blood to your heart muscle. This can narrow the blood vessels and reduce blood flow. A heart attack happens when blood flow is completely blocked. A high-fat diet, smoking, and other factors increase the risk of heart disease. Your doctor has found that you have a chance of having heart disease. You can do lots of things to keep your heart healthy. It may not be easy, but you can change your diet, exercise more, and quit smoking. These steps really work to lower your chance of heart disease. Follow-up care is a key part of your treatment and safety. Be sure to make and go to all appointments, and call your doctor if you are having problems. It's also a good idea to know your test results and keep a list of the medicines you take. How can you care for yourself at home? Diet    Use less salt when you cook and eat. This helps lower your blood pressure. Taste food before salting. Add only a little salt when you think you need it. With time, your taste buds will adjust to less salt. Eat fewer snack items, fast foods, canned soups, and other high-salt, high-fat, processed foods. Read food labels and try to avoid saturated and trans fats. They increase your risk of heart disease by raising cholesterol levels. Limit the amount of solid fat-butter, margarine, and shortening-you eat. Use olive, peanut, or canola oil when you cook. Bake, broil, and steam foods instead of frying them. Eat a variety of fruit and vegetables every day. Dark green, deep orange, red, or yellow fruits and vegetables are especially good for you. Examples include spinach, carrots, peaches, and berries. Foods high in fiber can reduce your cholesterol and provide important vitamins and minerals. High-fiber foods include whole-grain cereals and breads, oatmeal, beans, brown rice, citrus fruits, and apples. Eat lean proteins.  Heart-healthy proteins include seafood, lean meats and poultry, eggs, beans, peas, nuts, seeds, and soy products. Limit drinks and foods with added sugar. These include candy, desserts, and soda pop. Lifestyle changes    If your doctor recommends it, get more exercise. Walking is a good choice. Bit by bit, increase the amount you walk every day. Try for at least 30 minutes on most days of the week. You also may want to swim, bike, or do other activities. Do not smoke. If you need help quitting, talk to your doctor about stop-smoking programs and medicines. These can increase your chances of quitting for good. Quitting smoking may be the most important step you can take to protect your heart. It is never too late to quit. Limit alcohol to 2 drinks a day for men and 1 drink a day for women. Too much alcohol can cause health problems. Manage other health problems such as diabetes, high blood pressure, and high cholesterol. If you think you may have a problem with alcohol or drug use, talk to your doctor. Medicines    Take your medicines exactly as prescribed. Call your doctor if you think you are having a problem with your medicine. If your doctor recommends aspirin, take the amount directed each day. Make sure you take aspirin and not another kind of pain reliever, such as acetaminophen (Tylenol). When should you call for help? Call 911 if you have symptoms of a heart attack. These may include:    Chest pain or pressure, or a strange feeling in the chest.     Sweating. Shortness of breath. Pain, pressure, or a strange feeling in the back, neck, jaw, or upper belly or in one or both shoulders or arms. Lightheadedness or sudden weakness. A fast or irregular heartbeat. After you call 911, the  may tell you to chew 1 adult-strength or 2 to 4 low-dose aspirin. Wait for an ambulance. Do not try to drive yourself. Watch closely for changes in your health, and be sure to contact your doctor if you have any problems. Where can you learn more?   Go to http://www.[employer]/ and enter F075 to learn more about \"A Healthy Heart: Care Instructions. \"  Current as of: September 7, 2022               Content Version: 13.5  © 9125-6635 Healthwise, Incorporated. Care instructions adapted under license by [facility] ([facility]). If you have questions about a medical condition or this instruction, always ask your healthcare professional. [place] [address] any warranty or liability for your use of this information. Personalized Preventive Plan for Reji Lorenzana - 2/17/2023  Medicare offers a range of preventive health benefits. Some of the tests and screenings are paid in full while other may be subject to a deductible, co-insurance, and/or copay. Some of these benefits include a comprehensive review of your medical history including lifestyle, illnesses that may run in your family, and various assessments and screenings as appropriate. After reviewing your medical record and screening and assessments performed today your provider may have ordered immunizations, labs, imaging, and/or referrals for you. A list of these orders (if applicable) as well as your Preventive Care list are included within your After Visit Summary for your review. Other Preventive Recommendations:    A preventive eye exam performed by an eye specialist is recommended every 1-2 years to screen for glaucoma; cataracts, macular degeneration, and other eye disorders. A preventive dental visit is recommended every 6 months. Try to get at least 150 minutes of exercise per week or 10,000 steps per day on a pedometer . Order or download the FREE \"Exercise & Physical Activity: Your Everyday Guide\" from [employer]. Call 9-914.581.1591 or search [employer] online. You need 8769-7563 mg of calcium and 4730-1733 IU of vitamin D per day.  It is possible to meet your calcium requirement with diet alone, but a vitamin D supplement is usually necessary to meet this goal.  When exposed to the sun, use a sunscreen that protects against both UVA and UVB radiation with an SPF of 30 or greater. Reapply every 2 to 3 hours or after sweating, drying off with a towel, or swimming. Always wear a seat belt when traveling in a car. Always wear a helmet when riding a bicycle or motorcycle. Heart-Healthy Diet   Sodium, Fat, and Cholesterol Controlled Diet       What Is a Heart Healthy Diet? A heart-healthy diet is one that limits sodium , certain types of fat , and cholesterol . This type of diet is recommended for:   People with any form of cardiovascular disease (eg, coronary heart disease , peripheral vascular disease , previous heart attack , previous stroke )   People with risk factors for cardiovascular disease, such as high blood pressure , high cholesterol , or diabetes   Anyone who wants to lower their risk of developing cardiovascular disease   Sodium    Sodium is a mineral found in many foods. In general, most people consume much more sodium than they need. Diets high in sodium can increase blood pressure and lead to edema (water retention). On a heart-healthy diet, you should consume no more than 2,300 mg (milligrams) of sodium per dayabout the amount in one teaspoon of table salt. The foods highest in sodium include table salt (about 50% sodium), processed foods, convenience foods, and preserved foods. Cholesterol    Cholesterol is a fat-like, waxy substance in your blood. Our bodies make some cholesterol. It is also found in animal products, with the highest amounts in fatty meat, egg yolks, whole milk, cheese, shellfish, and organ meats. On a heart-healthy diet, you should limit your cholesterol intake to less than 200 mg per day.    It is normal and important to have some cholesterol in your bloodstream. But too much cholesterol can cause plaque to build up within your arteries, which can eventually lead to a heart attack or stroke. The two types of cholesterol that are most commonly referred to are:   Low-density lipoprotein (LDL) cholesterol  Also known as bad cholesterol, this is the cholesterol that tends to build up along your arteries. Bad cholesterol levels are increased by eating fats that are saturated or hydrogenated. Optimal level of this cholesterol is less than 100. Over 130 starts to get risky for heart disease. High-density lipoprotein (HDL) cholesterol  Also known as good cholesterol, this type of cholesterol actually carries cholesterol away from your arteries and may, therefore, help lower your risk of having a heart attack. You want this level to be high (ideally greater than 60). It is a risk to have a level less than 40. You can raise this good cholesterol by eating olive oil, canola oil, avocados, or nuts. Exercise raises this level, too. Fat    Fat is calorie dense and packs a lot of calories into a small amount of food. Even though fats should be limited due to their high calorie content, not all fats are bad. In fact, some fats are quite healthful. Fat can be broken down into four main types.    The good-for-you fats are:   Monounsaturated fat  found in oils such as olive and canola, avocados, and nuts and natural nut butters; can decrease cholesterol levels, while keeping levels of HDL cholesterol high   Polyunsaturated fat  found in oils such as safflower, sunflower, soybean, corn, and sesame; can decrease total cholesterol and LDL cholesterol   Omega-3 fatty acids  particularly those found in fatty fish (such as salmon, trout, tuna, mackerel, herring, and sardines); can decrease risk of arrhythmias, decrease triglyceride levels, and slightly lower blood pressure   The fats that you want to limit are:   Saturated fat  found in animal products, many fast foods, and a few vegetables; increases total blood cholesterol, including LDL levels   Animal fats that are saturated include: butter, lard, whole-milk dairy products, meat fat, and poultry skin   Vegetable fats that are saturated include: hydrogenated shortening, palm oil, coconut oil, cocoa butter   Hydrogenated or trans fat  found in margarine and vegetable shortening, most shelf stable snack foods, and fried foods; increases LDL and decreases HDL     It is generally recommended that you limit your total fat for the day to less than 30% of your total calories. If you follow an 1800-calorie heart healthy diet, for example, this would mean 60 grams of fat or less per day. Saturated fat and trans fat in your diet raises your blood cholesterol the most, much more than dietary cholesterol does. For this reason, on a heart-healthy diet, less than 7% of your calories should come from saturated fat and ideally 0% from trans fat. On an 1800-calorie diet, this translates into less than 14 grams of saturated fat per day, leaving 46 grams of fat to come from mono- and polyunsaturated fats.    Food Choices on a Heart Healthy Diet   Food Category   Foods Recommended   Foods to Avoid   Grains   Breads and rolls without salted tops Most dry and cooked cereals Unsalted crackers and breadsticks Low-sodium or homemade breadcrumbs or stuffing All rice and pastas   Breads, rolls, and crackers with salted tops High-fat baked goods (eg, muffins, donuts, pastries) Quick breads, self-rising flour, and biscuit mixes Regular bread crumbs Instant hot cereals Commercially prepared rice, pasta, or stuffing mixes   Vegetables   Most fresh, frozen, and low-sodium canned vegetables Low-sodium and salt-free vegetable juices Canned vegetables if unsalted or rinsed   Regular canned vegetables and juices, including sauerkraut and pickled vegetables Frozen vegetables with sauces Commercially prepared potato and vegetable mixes   Fruits   Most fresh, frozen, and canned fruits All fruit juices   Fruits processed with salt or sodium   Milk   Nonfat or low-fat (1%) milk Nonfat or low-fat yogurt Jumia cheese, low-fat ricotta, cheeses labeled as low-fat and low-sodium   Whole milk Reduced-fat (2%) milk Malted and chocolate milk Full fat yogurt Most cheeses (unless low-fat and low salt) Buttermilk (no more than 1 cup per week)   Meats and Beans   Lean cuts of fresh or frozen beef, veal, lamb, or pork (look for the word loin) Fresh or frozen poultry without the skin Fresh or frozen fish and some shellfish Egg whites and egg substitutes (Limit whole eggs to three per week) Tofu Nuts or seeds (unsalted, dry-roasted), low-sodium peanut butter Dried peas, beans, and lentils   Any smoked, cured, salted, or canned meat, fish, or poultry (including kemp, chipped beef, cold cuts, hot dogs, sausages, sardines, and anchovies) Poultry skins Breaded and/or fried fish or meats Canned peas, beans, and lentils Salted nuts   Fats and Oils   Olive oil and canola oil Low-sodium, low-fat salad dressings and mayonnaise   Butter, margarine, coconut and palm oils, kemp fat   Snacks, Sweets, and Condiments   Low-sodium or unsalted versions of broths, soups, soy sauce, and condiments Pepper, herbs, and spices; vinegar, lemon, or lime juice Low-fat frozen desserts (yogurt, sherbet, fruit bars) Sugar, cocoa powder, honey, syrup, jam, and preserves Low-fat, trans-fat free cookies, cakes, and pies Laz and animal crackers, fig bars, wenceslao snaps   High-fat desserts Broth, soups, gravies, and sauces, made from instant mixes or other high-sodium ingredients Salted snack foods Canned olives Meat tenderizers, seasoning salt, and most flavored vinegars   Beverages   Low-sodium carbonated beverages Tea and coffee in moderation Soy milk   Commercially softened water   Suggestions   Make whole grains, fruits, and vegetables the base of your diet. Choose heart-healthy fats such as canola, olive, and flaxseed oil, and foods high in heart-healthy fats, such as nuts, seeds, soybeans, tofu, and fish.     Eat fish at least twice per week; the fish highest in omega-3 fatty acids and lowest in mercury include salmon, herring, mackerel, sardines, and canned chunk light tuna. If you eat fish less than twice per week or have high triglycerides, talk to your doctor about taking fish oil supplements. Read food labels. For products low in fat and cholesterol, look for fat free, low-fat, cholesterol free, saturated fat free, and trans fat freeAlso scan the Nutrition Facts Label, which lists saturated fat, trans fat, and cholesterol amounts. For products low in sodium, look for sodium free, very low sodium, low sodium, no added salt, and unsalted   Skip the salt when cooking or at the table; if food needs more flavor, get creative and try out different herbs and spices. Garlic and onion also add substantial flavor to foods. Trim any visible fat off meat and poultry before cooking, and drain the fat off after lew. Use cooking methods that require little or no added fat, such as grilling, boiling, baking, poaching, broiling, roasting, steaming, stir-frying, and sauting. Avoid fast food and convenience food. They tend to be high in saturated and trans fat and have a lot of added salt. Talk to a registered dietitian for individualized diet advice. Last Reviewed: March 2011 Meliton Vanessa MS, MPH, RD   Updated: 3/29/2011     Heart-Healthy Diet   Sodium, Fat, and Cholesterol Controlled Diet       What Is a Heart Healthy Diet? A heart-healthy diet is one that limits sodium , certain types of fat , and cholesterol . This type of diet is recommended for:   People with any form of cardiovascular disease (eg, coronary heart disease , peripheral vascular disease , previous heart attack , previous stroke )   People with risk factors for cardiovascular disease, such as high blood pressure , high cholesterol , or diabetes   Anyone who wants to lower their risk of developing cardiovascular disease   Sodium    Sodium is a mineral found in many foods.  In general, most people consume much more sodium than they need. Diets high in sodium can increase blood pressure and lead to edema (water retention). On a heart-healthy diet, you should consume no more than 2,300 mg (milligrams) of sodium per dayabout the amount in one teaspoon of table salt. The foods highest in sodium include table salt (about 50% sodium), processed foods, convenience foods, and preserved foods. Cholesterol    Cholesterol is a fat-like, waxy substance in your blood. Our bodies make some cholesterol. It is also found in animal products, with the highest amounts in fatty meat, egg yolks, whole milk, cheese, shellfish, and organ meats. On a heart-healthy diet, you should limit your cholesterol intake to less than 200 mg per day. It is normal and important to have some cholesterol in your bloodstream. But too much cholesterol can cause plaque to build up within your arteries, which can eventually lead to a heart attack or stroke. The two types of cholesterol that are most commonly referred to are:   Low-density lipoprotein (LDL) cholesterol  Also known as bad cholesterol, this is the cholesterol that tends to build up along your arteries. Bad cholesterol levels are increased by eating fats that are saturated or hydrogenated. Optimal level of this cholesterol is less than 100. Over 130 starts to get risky for heart disease. High-density lipoprotein (HDL) cholesterol  Also known as good cholesterol, this type of cholesterol actually carries cholesterol away from your arteries and may, therefore, help lower your risk of having a heart attack. You want this level to be high (ideally greater than 60). It is a risk to have a level less than 40. You can raise this good cholesterol by eating olive oil, canola oil, avocados, or nuts. Exercise raises this level, too. Fat    Fat is calorie dense and packs a lot of calories into a small amount of food.  Even though fats should be limited due to their high calorie content, not all fats are bad. In fact, some fats are quite healthful. Fat can be broken down into four main types. The good-for-you fats are:   Monounsaturated fat  found in oils such as olive and canola, avocados, and nuts and natural nut butters; can decrease cholesterol levels, while keeping levels of HDL cholesterol high   Polyunsaturated fat  found in oils such as safflower, sunflower, soybean, corn, and sesame; can decrease total cholesterol and LDL cholesterol   Omega-3 fatty acids  particularly those found in fatty fish (such as salmon, trout, tuna, mackerel, herring, and sardines); can decrease risk of arrhythmias, decrease triglyceride levels, and slightly lower blood pressure   The fats that you want to limit are:   Saturated fat  found in animal products, many fast foods, and a few vegetables; increases total blood cholesterol, including LDL levels   Animal fats that are saturated include: butter, lard, whole-milk dairy products, meat fat, and poultry skin   Vegetable fats that are saturated include: hydrogenated shortening, palm oil, coconut oil, cocoa butter   Hydrogenated or trans fat  found in margarine and vegetable shortening, most shelf stable snack foods, and fried foods; increases LDL and decreases HDL     It is generally recommended that you limit your total fat for the day to less than 30% of your total calories. If you follow an 1800-calorie heart healthy diet, for example, this would mean 60 grams of fat or less per day. Saturated fat and trans fat in your diet raises your blood cholesterol the most, much more than dietary cholesterol does. For this reason, on a heart-healthy diet, less than 7% of your calories should come from saturated fat and ideally 0% from trans fat. On an 1800-calorie diet, this translates into less than 14 grams of saturated fat per day, leaving 46 grams of fat to come from mono- and polyunsaturated fats.    Food Choices on a [address] Category   Foods Recommended   Foods to Avoid   Grains   Breads and rolls without salted tops Most dry and cooked cereals Unsalted crackers and breadsticks Low-sodium or homemade breadcrumbs or stuffing All rice and pastas   Breads, rolls, and crackers with salted tops High-fat baked goods (eg, muffins, donuts, pastries) Quick breads, self-rising flour, and biscuit mixes Regular bread crumbs Instant hot cereals Commercially prepared rice, pasta, or stuffing mixes   Vegetables   Most fresh, frozen, and low-sodium canned vegetables Low-sodium and salt-free vegetable juices Canned vegetables if unsalted or rinsed   Regular canned vegetables and juices, including sauerkraut and pickled vegetables Frozen vegetables with sauces Commercially prepared potato and vegetable mixes   Fruits   Most fresh, frozen, and canned fruits All fruit juices   Fruits processed with salt or sodium   Milk   Nonfat or low-fat (1%) milk Nonfat or low-fat yogurt Cottage cheese, low-fat ricotta, cheeses labeled as low-fat and low-sodium   Whole milk Reduced-fat (2%) milk Malted and chocolate milk Full fat yogurt Most cheeses (unless low-fat and low salt) Buttermilk (no more than 1 cup per week)   Meats and Beans   Lean cuts of fresh or frozen beef, veal, lamb, or pork (look for the word loin) Fresh or frozen poultry without the skin Fresh or frozen fish and some shellfish Egg whites and egg substitutes (Limit whole eggs to three per week) Tofu Nuts or seeds (unsalted, dry-roasted), low-sodium peanut butter Dried peas, beans, and lentils   Any smoked, cured, salted, or canned meat, fish, or poultry (including kemp, chipped beef, cold cuts, hot dogs, sausages, sardines, and anchovies) Poultry skins Breaded and/or fried fish or meats Canned peas, beans, and lentils Salted nuts   Fats and Oils   Olive oil and canola oil Low-sodium, low-fat salad dressings and mayonnaise   Butter, margarine, coconut and palm oils, kemp fat   Snacks, Sweets, and Condiments   Low-sodium or unsalted versions of broths, soups, soy sauce, and condiments Pepper, herbs, and spices; vinegar, lemon, or lime juice Low-fat frozen desserts (yogurt, sherbet, fruit bars) Sugar, cocoa powder, honey, syrup, jam, and preserves Low-fat, trans-fat free cookies, cakes, and pies Laz and animal crackers, fig bars, wenceslao snaps   High-fat desserts Broth, soups, gravies, and sauces, made from instant mixes or other high-sodium ingredients Salted snack foods Canned olives Meat tenderizers, seasoning salt, and most flavored vinegars   Beverages   Low-sodium carbonated beverages Tea and coffee in moderation Soy milk   Commercially softened water   Suggestions   Make whole grains, fruits, and vegetables the base of your diet. Choose heart-healthy fats such as canola, olive, and flaxseed oil, and foods high in heart-healthy fats, such as nuts, seeds, soybeans, tofu, and fish. Eat fish at least twice per week; the fish highest in omega-3 fatty acids and lowest in mercury include salmon, herring, mackerel, sardines, and canned chunk light tuna. If you eat fish less than twice per week or have high triglycerides, talk to your doctor about taking fish oil supplements. Read food labels. For products low in fat and cholesterol, look for fat free, low-fat, cholesterol free, saturated fat free, and trans fat freeAlso scan the Nutrition Facts Label, which lists saturated fat, trans fat, and cholesterol amounts. For products low in sodium, look for sodium free, very low sodium, low sodium, no added salt, and unsalted   Skip the salt when cooking or at the table; if food needs more flavor, get creative and try out different herbs and spices. Garlic and onion also add substantial flavor to foods. Trim any visible fat off meat and poultry before cooking, and drain the fat off after lew.     Use cooking methods that require little or no added fat, such as grilling, boiling, baking, poaching, broiling, roasting, steaming, stir-frying, and sauting. Avoid fast food and convenience food. They tend to be high in saturated and trans fat and have a lot of added salt. Talk to a registered dietitian for individualized diet advice. Last Reviewed: March 2011 Prema Caruso MS, MPH, RD   Updated: 3/29/2011     Heart-Healthy Diet   Sodium, Fat, and Cholesterol Controlled Diet       What Is a Heart Healthy Diet? A heart-healthy diet is one that limits sodium , certain types of fat , and cholesterol . This type of diet is recommended for:   People with any form of cardiovascular disease (eg, coronary heart disease , peripheral vascular disease , previous heart attack , previous stroke )   People with risk factors for cardiovascular disease, such as high blood pressure , high cholesterol , or diabetes   Anyone who wants to lower their risk of developing cardiovascular disease   Sodium    Sodium is a mineral found in many foods. In general, most people consume much more sodium than they need. Diets high in sodium can increase blood pressure and lead to edema (water retention). On a heart-healthy diet, you should consume no more than 2,300 mg (milligrams) of sodium per dayabout the amount in one teaspoon of table salt. The foods highest in sodium include table salt (about 50% sodium), processed foods, convenience foods, and preserved foods. Cholesterol    Cholesterol is a fat-like, waxy substance in your blood. Our bodies make some cholesterol. It is also found in animal products, with the highest amounts in fatty meat, egg yolks, whole milk, cheese, shellfish, and organ meats. On a heart-healthy diet, you should limit your cholesterol intake to less than 200 mg per day. It is normal and important to have some cholesterol in your bloodstream. But too much cholesterol can cause plaque to build up within your arteries, which can eventually lead to a heart attack or stroke.    The two types of cholesterol that are most commonly referred to are:   Low-density lipoprotein (LDL) cholesterol  Also known as bad cholesterol, this is the cholesterol that tends to build up along your arteries. Bad cholesterol levels are increased by eating fats that are saturated or hydrogenated. Optimal level of this cholesterol is less than 100. Over 130 starts to get risky for heart disease. High-density lipoprotein (HDL) cholesterol  Also known as good cholesterol, this type of cholesterol actually carries cholesterol away from your arteries and may, therefore, help lower your risk of having a heart attack. You want this level to be high (ideally greater than 60). It is a risk to have a level less than 40. You can raise this good cholesterol by eating olive oil, canola oil, avocados, or nuts. Exercise raises this level, too. Fat    Fat is calorie dense and packs a lot of calories into a small amount of food. Even though fats should be limited due to their high calorie content, not all fats are bad. In fact, some fats are quite healthful. Fat can be broken down into four main types.    The good-for-you fats are:   Monounsaturated fat  found in oils such as olive and canola, avocados, and nuts and natural nut butters; can decrease cholesterol levels, while keeping levels of HDL cholesterol high   Polyunsaturated fat  found in oils such as safflower, sunflower, soybean, corn, and sesame; can decrease total cholesterol and LDL cholesterol   Omega-3 fatty acids  particularly those found in fatty fish (such as salmon, trout, tuna, mackerel, herring, and sardines); can decrease risk of arrhythmias, decrease triglyceride levels, and slightly lower blood pressure   The fats that you want to limit are:   Saturated fat  found in animal products, many fast foods, and a few vegetables; increases total blood cholesterol, including LDL levels   Animal fats that are saturated include: butter, lard, whole-milk dairy products, meat fat, and poultry skin Vegetable fats that are saturated include: hydrogenated shortening, palm oil, coconut oil, cocoa butter   Hydrogenated or trans fat  found in margarine and vegetable shortening, most shelf stable snack foods, and fried foods; increases LDL and decreases HDL     It is generally recommended that you limit your total fat for the day to less than 30% of your total calories. If you follow an 1800-calorie heart healthy diet, for example, this would mean 60 grams of fat or less per day. Saturated fat and trans fat in your diet raises your blood cholesterol the most, much more than dietary cholesterol does. For this reason, on a heart-healthy diet, less than 7% of your calories should come from saturated fat and ideally 0% from trans fat. On an 1800-calorie diet, this translates into less than 14 grams of saturated fat per day, leaving 46 grams of fat to come from mono- and polyunsaturated fats.    Food Choices on a Heart Healthy Diet   Food Category   Foods Recommended   Foods to Avoid   Grains   Breads and rolls without salted tops Most dry and cooked cereals Unsalted crackers and breadsticks Low-sodium or homemade breadcrumbs or stuffing All rice and pastas   Breads, rolls, and crackers with salted tops High-fat baked goods (eg, muffins, donuts, pastries) Quick breads, self-rising flour, and biscuit mixes Regular bread crumbs Instant hot cereals Commercially prepared rice, pasta, or stuffing mixes   Vegetables   Most fresh, frozen, and low-sodium canned vegetables Low-sodium and salt-free vegetable juices Canned vegetables if unsalted or rinsed   Regular canned vegetables and juices, including sauerkraut and pickled vegetables Frozen vegetables with sauces Commercially prepared potato and vegetable mixes   Fruits   Most fresh, frozen, and canned fruits All fruit juices   Fruits processed with salt or sodium   Milk   Nonfat or low-fat (1%) milk Nonfat or low-fat yogurt Cottage cheese, low-fat ricotta, cheeses labeled as low-fat and low-sodium   Whole milk Reduced-fat (2%) milk Malted and chocolate milk Full fat yogurt Most cheeses (unless low-fat and low salt) Buttermilk (no more than 1 cup per week)   Meats and Beans   Lean cuts of fresh or frozen beef, veal, lamb, or pork (look for the word loin) Fresh or frozen poultry without the skin Fresh or frozen fish and some shellfish Egg whites and egg substitutes (Limit whole eggs to three per week) Tofu Nuts or seeds (unsalted, dry-roasted), low-sodium peanut butter Dried peas, beans, and lentils   Any smoked, cured, salted, or canned meat, fish, or poultry (including kemp, chipped beef, cold cuts, hot dogs, sausages, sardines, and anchovies) Poultry skins Breaded and/or fried fish or meats Canned peas, beans, and lentils Salted nuts   Fats and Oils   Olive oil and canola oil Low-sodium, low-fat salad dressings and mayonnaise   Butter, margarine, coconut and palm oils, kemp fat   Snacks, Sweets, and Condiments   Low-sodium or unsalted versions of broths, soups, soy sauce, and condiments Pepper, herbs, and spices; vinegar, lemon, or lime juice Low-fat frozen desserts (yogurt, sherbet, fruit bars) Sugar, cocoa powder, honey, syrup, jam, and preserves Low-fat, trans-fat free cookies, cakes, and pies Laz and animal crackers, fig bars, wenceslao snaps   High-fat desserts Broth, soups, gravies, and sauces, made from instant mixes or other high-sodium ingredients Salted snack foods Canned olives Meat tenderizers, seasoning salt, and most flavored vinegars   Beverages   Low-sodium carbonated beverages Tea and coffee in moderation Soy milk   Commercially softened water   Suggestions   Make whole grains, fruits, and vegetables the base of your diet. Choose heart-healthy fats such as canola, olive, and flaxseed oil, and foods high in heart-healthy fats, such as nuts, seeds, soybeans, tofu, and fish.     Eat fish at least twice per week; the fish highest in omega-3 fatty acids and lowest in mercury include salmon, herring, mackerel, sardines, and canned chunk light tuna. If you eat fish less than twice per week or have high triglycerides, talk to your doctor about taking fish oil supplements. Read food labels. For products low in fat and cholesterol, look for fat free, low-fat, cholesterol free, saturated fat free, and trans fat freeAlso scan the Nutrition Facts Label, which lists saturated fat, trans fat, and cholesterol amounts. For products low in sodium, look for sodium free, very low sodium, low sodium, no added salt, and unsalted   Skip the salt when cooking or at the table; if food needs more flavor, get creative and try out different herbs and spices. Garlic and onion also add substantial flavor to foods. Trim any visible fat off meat and poultry before cooking, and drain the fat off after lew. Use cooking methods that require little or no added fat, such as grilling, boiling, baking, poaching, broiling, roasting, steaming, stir-frying, and sauting. Avoid fast food and convenience food. They tend to be high in saturated and trans fat and have a lot of added salt. Talk to a registered dietitian for individualized diet advice. Last Reviewed: March 2011 Ewa Horner MS, MPH, RD   Updated: 3/29/2011       Preventing Osteoporosis: After Your Visit  Your Care Instructions  Osteoporosis means the bones are weak and thin enough that they can break easily. The older you are, the more likely you are to get osteoporosis. But with plenty of calcium, vitamin D, and exercise, you can help prevent osteoporosis. The preteen and teen years are a key time for bone building. With the help of calcium, vitamin D, and exercise in those early years and beyond, the bones reach their peak density and strength by age 27. After age 27, your bones naturally start to thin and weaken. The stronger your bones are at around age 27, the lower your risk for osteoporosis.  But no matter what your age and risk are, your bones still need calcium, vitamin D, and exercise to stay strong. Also avoid smoking, and limit alcohol. Smoking and heavy alcohol use can make your bones thinner. Talk to your doctor about any special risks you might have, such as having a close relative with osteoporosis or taking a medicine that can weaken bones. Your doctor can tell you the best ways to protect your bones from thinning. Follow-up care is a key part of your treatment and safety. Be sure to make and go to all appointments, and call your doctor if you are having problems. It's also a good idea to know your test results and keep a list of the medicines you take. How can you care for yourself at home? Get enough calcium and vitamin D. The [employer] recommends adults younger than age 46 need 1,000 mg of calcium and 600 IU of vitamin D each day. Women ages 46 to 79 need 1,200 mg of calcium and 600 IU of vitamin D each day. Men ages 46 to 79 need 1,000 mg of calcium and 600 IU of vitamin D each day. Adults 71 and older need 1,200 mg of calcium and 800 IU of vitamin D each day. Eat foods rich in calcium, like yogurt, cheese, milk, and dark green vegetables. Eat foods rich in vitamin D, like eggs, fatty fish, cereal, and fortified milk. Get some sunshine. Your body uses sunshine to make its own vitamin D. The safest time to be out in the sun is before 10 a.m. or after 3 p.m. Avoid getting sunburned. Sunburn can increase your risk of skin cancer. Talk to your doctor about taking a calcium plus vitamin D supplement. Ask about what type of calcium is right for you, and how much to take at a time. Adults ages 23 to 48 should not get more than 2,500 mg of calcium and 4,000 IU of vitamin D each day, whether it is from supplements and/or food. Adults ages 46 and older should not get more than 2,000 mg of calcium and 4,000 IU of vitamin D each day from supplements and/or food. Get regular bone-building exercise. Weight-bearing and resistance exercises keep bones healthy by working the muscles and bones against gravity. Start out at an exercise level that feels right for you. Add a little at a time until you can do the following:  Do 30 minutes of weight-bearing exercise on most days of the week. Walking, jogging, stair climbing, and dancing are good choices. Do resistance exercises with weights or elastic bands 2 to 3 days a week. Limit alcohol. Drink no more than 1 alcohol drink a day if you are a woman. Drink no more than 2 alcohol drinks a day if you are a man. Do not smoke. Smoking can make bones thin faster. If you need help quitting, talk to your doctor about stop-smoking programs and medicines. These can increase your chances of quitting for good. When should you call for help? Watch closely for changes in your health, and be sure to contact your doctor if:  You need help with a healthy eating plan. You need help with an exercise plan    © 4878-5973 [employer]. Care instructions adapted under license by [facility]. This care instruction is for use with your licensed healthcare professional. If you have questions about a medical condition or this instruction, always ask your healthcare professional. [address] any warranty or liability for your use of this information. Content Version: 9.4.30880; Last Revised: June 20, 2011                  DASH Diet: After Your Visit  Your Care Instructions  The DASH diet is an eating plan that can help lower your blood pressure. DASH stands for Dietary Approaches to Stop Hypertension. Hypertension is high blood pressure. The DASH diet focuses on eating foods that are high in calcium, potassium, and magnesium. These nutrients can lower blood pressure. The foods that are highest in these nutrients are fruits, vegetables, low-fat dairy products, nuts, seeds, and legumes.  But taking calcium, potassium, and magnesium supplements instead of eating foods that are high in those nutrients does not have the same effect. The DASH diet also includes whole grains, fish, and poultry. The DASH diet is one of several lifestyle changes your doctor may recommend to lower your high blood pressure. Your doctor may also want you to decrease the amount of sodium in your diet. Lowering sodium while following the DASH diet can lower blood pressure even further than just the DASH diet alone. Follow-up care is a key part of your treatment and safety. Be sure to make and go to all appointments, and call your doctor if you are having problems. Its also a good idea to know your test results and keep a list of the medicines you take. How can you care for yourself at home? Following the DASH diet  Eat 4 to 5 servings of fruit each day. A serving is 1 medium-sized piece of fruit, ½ cup chopped or canned fruit, 1/4 cup dried fruit, or 4 ounces (½ cup) of fruit juice. Choose fruit more often than fruit juice. Eat 4 to 5 servings of vegetables each day. A serving is 1 cup of lettuce or raw leafy vegetables, ½ cup of chopped or cooked vegetables, or 4 ounces (½ cup) of vegetable juice. Choose vegetables more often than vegetable juice. Get 2 to 3 servings of low-fat and fat-free dairy each day. A serving is 8 ounces of milk, 1 cup of yogurt, or 1 ½ ounces of cheese. Eat 7 to 8 servings of grains each day. A serving is 1 slice of bread, 1 ounce of dry cereal, or ½ cup of cooked rice, pasta, or cooked cereal. Try to choose whole-grain products as much as possible. Limit lean meat, poultry, and fish to 6 ounces each day. Six ounces is about the size of two decks of cards. Eat 4 to 5 servings of nuts, seeds, and legumes (cooked dried beans, lentils, and split peas) each week. A serving is 1/3 cup of nuts, 2 tablespoons of seeds, or ½ cup cooked dried beans or peas. Limit sweets and added sugars to 5 servings or less a week.  A serving is 1 tablespoon jelly or jam, ½ cup sorbet, or 1 cup of lemonade. Tips for success  Start small. Do not try to make dramatic changes to your diet all at once. You might feel that you are missing out on your favorite foods and then be more likely to not follow the plan. Make small changes, and stick with them. Once those changes become habit, add a few more changes. Try some of the following:  Make it a goal to eat a fruit or vegetable at every meal and at snacks. This will make it easy to get the recommended amount of fruits and vegetables each day. Try yogurt topped with fruit and nuts for a snack or healthy dessert. Add lettuce, tomato, cucumber, and onion to sandwiches. Combine a ready-made pizza crust with low-fat mozzarella cheese and lots of vegetable toppings. Try using tomatoes, squash, spinach, broccoli, carrots, cauliflower, and onions. Have a variety of cut-up vegetables with a low-fat dip as an appetizer instead of chips and dip. Sprinkle sunflower seeds or chopped almonds over salads. Or try adding chopped walnuts or almonds to cooked vegetables. Try some vegetarian meals using beans and peas. Add garbanzo or kidney beans to salads. Make burritos and tacos with mashed franco beans or black beans    © 7248-9214 [employer]. Care instructions adapted under license by [facility]. This care instruction is for use with your licensed healthcare professional. If you have questions about a medical condition or this instruction, always ask your healthcare professional. [place] [address] any warranty or liability for your use of this information. Content Version: 9.4.75801; Last Revised: March 15, 2012                High-Fiber Diet     What Is Fiber? Dietary fiber is a form of carbohydrate found in plants that cannot be digested by humans. All plants contain fiber, including fruits, vegetables, grains, and legumes. Fiber is often classified into two categories: soluble and insoluble. Soluble fiber draws water into the bowel and can help slow digestion. Examples of foods that are high in soluble fiber include oatmeal, oat bran, barley, legumes (eg, beans and peas), apples, and strawberries. Insoluble fiber speeds digestion and can add bulk to the stool. Examples of foods that are high in insoluble fiber include whole-wheat products, wheat bran, cauliflower, green beans, and potatoes. Why Follow a High-Fiber Diet? A high-fiber diet is often recommended to prevent and treat constipation , hemorrhoids , diverticulitis , and irritable bowel syndrome . Eating a high-fiber diet can also help improve your cholesterol levels, lower your risk of coronary heart disease , reduce your risk of type 2 diabetes , and lower your weight. For people with type 1 or 2 diabetes, a high-fiber diet can also help stabilize blood sugar levels. How Much Fiber Should I Eat? A high-fiber diet should contain  20-35 grams  of fiber a day. This is actually the amount recommended for the general adult population; however, most Americans eat only 15 grams of fiber per day. Digestion of Fiber   Eating a higher fiber diet than usual can take some getting used to by your body's digestive system. To avoid the side effects of sudden increases in dietary fiber (eg, gas, cramping, bloating, and diarrhea), increase fiber gradually and be sure to drink plenty of fluids every day. Tips for Increasing Fiber Intake   Whenever possible, choose whole grains over refined grains (eg, brown rice instead of white rice, whole-wheat bread instead of white bread). Include a variety of grains in your diet, such as wheat, rye, barley, oats, quinoa, and bulgur. Eat more vegetarian-based meals. Here are some ideas: black bean burgers, eggplant lasagna, and veggie tofu stir-donis. Choose high-fiber snacks, such as fruits, popcorn, whole-grain crackers, and nuts.     Make whole-grain cereal or whole-grain toast part of your daily breakfast regime. When eating out, whether ordering a sandwich or dinner, ask for extra vegetables. When baking, replace part of the white flour with whole-wheat flour. Whole-wheat flour is particularly easy to incorporate into a recipe. High-Fiber Diet Eating Guide   Food Category   Foods Recommended   Notes   Grains   Whole-grain breads, muffins, bagels, or briana bread Rye bread Whole-wheat crackers or crisp breads Whole-grain or bran cereals Oatmeal, oat bran, or grits Wheat germ Whole-wheat pasta and brown rice   Read the ingredients list on food labels. Look for products that list \"whole\" as the first ingredient (eg, whole-wheat, whole oats). Choose cereals with at least 2 grams of fiber per serving. Vegetables   All vegetables, especially asparagus, bean sprouts, broccoli, [place] sprouts, cabbage, carrots, cauliflower, celery, corn, greens, green beans, green pepper, onions, peas, potatoes (with skin), snow peas, spinach, squash, sweet potatoes, tomatoes, zucchini   For maximum fiber intake, eat the peels of fruits and vegetablesjust be sure to wash them well first.   Fruits   All fruits, especially apples, berries, grapefruits, mangoes, nectarines, oranges, peaches, pears, dried fruits (figs, dates, prunes, raisins)   Choose raw fruits and vegetables over juice, cooked, or cannedraw fruit has more fiber. Dried fruit is also a good source of fiber. Milk   With the exception of yogurt containing inulin (a type of fiber), dairy foods provide little fiber. Add more fiber by topping your yogurt or cottage cheese with fresh fruit, whole grain or bran cereals, nuts, or seeds.    Meats and Beans   All beans and peas, especially Garbanzo beans, kidney beans, lentils, lima beans, split peas, and franco beans All nuts and seeds, especially almonds, peanuts, [place] nuts, cashews, peanut butter, walnuts, sesame and sunflower seeds All meat, poultry, fish, and eggs   Increase fiber in meat dishes by adding franco beans, kidney beans, black-eyed peas, bran, or oatmeal. If you are following a low-fat diet, use nuts and seeds only in moderation. Fats and Oils   All in moderation   Fats and oils do not provide fiber   Snacks, Sweets, and Condiments   Fruit Nuts Popcorn, whole-wheat pretzels, or trail mix made with dried fruits, nuts, and seeds Cakes, breads, and cookies made with oatmeal or whole-wheat flour   Most snack foods do not provide much fiber. Choose snacks with at least 2 grams of fiber per serving. Last Reviewed: March 2011 Tunde Talavera MS, MPH, RD   Updated: 3/29/2011     Keep Your Memory Claudia Fink       Many factors can affect your ability to remembera hectic lifestyle, aging, stress, chronic disease, and certain medicines. But, there are steps you can take to sharpen your mind and help preserve your memory. Challenge Your Brain   Regularly challenging your mind may help keeps it in top shape. Good mental exercises include:   Crossword puzzlesUse a dictionary if you need it; you will learn more that way. Brainteasers Try some! Crafts, such as wood working and sewing   Hobbies, such as gardening and building model airplanes   SocializingVisit old friends or join groups to meet new ones. Reading   Learning a new language   Taking a class, whether it be art history or tata chi   TravelingExperience the food, history, and culture of your destination   Learning to use a computer   Going to museums, the theater, or thought-provoking movies   Changing things in your daily life, such as reversing your pattern in the grocery store or brushing your teeth using your nondominant hand   Use Memory Aids   There is no need to remember every detail on your own. These memory aids can help:   Calendars and day planners   Electronic organizers to store all sorts of helpful informationThese devices can \"beep\" to remind you of appointments.    A book of days to record birthdays, anniversaries, and other occasions that occur on the same date every year   Detailed \"to-do\" lists and strategically placed sticky notes   Quick \"study\" sessionsBefore a gathering, review who will be there so their names will be fresh in your mind. Establish routinesFor example, keep your keys, wallet, and umbrella in the same place all the time or take medicine with your 8:00 AM glass of juice   Live a Healthy Life   Many actions that will keep your body strong will do the same for your mind. For example:   Talk to Your Doctor About Herbs and Supplements    Malnutrition and vitamin deficiencies can impair your mental function. For example, vitamin B12 deficiency can cause a range of symptoms, including confusion. But, what if your nutritional needs are being met? Can herbs and supplements still offer a benefit? Researchers have investigated a range of natural remedies, such as ginkgo , ginseng , and the supplement phosphatidylserine (PS). So far, though, the evidence is inconsistent as to whether these products can improve memory or thinking. If you are interested in taking herbs and supplements, talk to your doctor first because they may interact with other medicines that you are taking. Exercise Regularly    Among the many benefits of regular exercise are increased blood flow to the brain and decreased risk of certain diseases that can interfere with memory function. One study found that even moderate exercise has a beneficial effect. Examples of \"moderate\" exercise include:   Playing 18 holes of golf once a week, without a cart   Playing tennis twice a week   Walking one mile per day   Manage Stress    It can be tough to remember what is important when your mind is cluttered. Make time for relaxation. Choose activities that calm you down, and make it routine. Manage Chronic Conditions    Side effects of high blood pressure , diabetes, and heart disease can interfere with mental function.  Many of the lifestyle steps discussed here can help manage these conditions. Strive to eat a healthy diet, exercise regularly, get stress under control, and follow your doctor's advice for your condition. Minimize Medications    Talk to your doctor about the medicines that you take. Some may be unnecessary. Also, healthy lifestyle habits may lower the need for certain drugs. Last Reviewed: April 2010 Linda Morel MD   Updated: 4/13/2010     Keeping Home a [address]       As we get older, changes in balance, gait, strength, vision, hearing, and cognition make even the most youthful senior more prone to accidents. Falls are one of the leading health risks for older people. This increased risk of falling is related to:   Aging process (eg, decreased muscle strength, slowed reflexes)   Higher incidence of chronic health problems (eg, arthritis, diabetes) that may limit mobility, agility or sensory awareness   Side effects of medicine (eg, dizziness, blurred vision)especially medicines like prescription pain medicines and drugs used to treat mental health conditions   Depending on the brittleness of your bones, the consequences of a fall can be serious and long lasting. Home Life   Research by the Association of Aging [facility]) shows that some home accidents among older adults can be prevented by making simple lifestyle changes and basic modifications and repairs to the home environment. Here are some lifestyle changes that experts recommend:   Have your hearing and vision checked regularly. Be sure to wear prescription glasses that are right for you. Speak to your doctor or pharmacist about the possible side effects of your medicines. A number of medicines can cause dizziness. If you have problems with sleep, talk to your doctor. Limit your intake of alcohol. If necessary, use a cane or walker to help maintain your balance. Wear supportive, rubber-soled shoes, even at home.  If you live in a region that gets wintry weather, you may want to put special cleats on your shoes to prevent you from slipping on the snow and ice. Exercise regularly to help maintain muscle tone, agility, and balance. Always hold the banister when going up or down stairs. Also, use  bars when getting in or out of the bath or shower, or using the toilet. To avoid dizziness, get up slowly from a lying down position. Sit up first, dangling your legs for a minute or two before rising to a standing position. Overall Home Safety Check   According to the Consumer Product Safety Commision's \"Older Consumer Home Safety Checklist,\" it is important to check for potential hazards in each room. And remember, proper lighting is an essential factor in home safety. If you cannot see clearly, you are more likely to fall. Important questions to ask yourself include:   Are lamp, electric, extension, and telephone cords placed out of the flow of traffic and maintained in good condition? Have frayed cords been replaced? Are all small rugs and runners slip resistant? If not, you can secure them to the floor with a special double-sided carpet tape. Are smoke detectors properly locatedone on every floor of your home and one outside of every sleeping area? Are they in good working order? Are batteries replaced at least once a year? Do you have a well-maintained carbon monoxide detector outside every sleeping are in your home? Does your furniture layout leave plenty of space to maneuver between and around chairs, tables, beds, and sofas? Are hallways, stairs and passages between rooms well lit? Can you reach a lamp without getting out of bed? Are floor surfaces well maintained? Shag rugs, high-pile carpeting, tile floors, and polished wood floors can be particularly slippery. Stairs should always have handrails and be carpeted or fitted with a non-skid tread. Is your telephone easily reachable. Is the cord safely tucked away?    Room by Room   According to the Association of Aging, bathrooms and ophelia are the two most potentially hazardous rooms in your home. In the Kitchen    Be sure your stove is in proper working order and always make sure burners and the oven are off before you go out or go to sleep. Keep pots on the back burners, turn handles away from the front of the stove, and keep stove clean and free of grease build-up. Kitchen ventilation systems and range exhausts should be working properly. Keep flammable objects such as towels and pot holders away from the cooking area except when in use. Make sure kitchen curtains are tied back. Move cords and appliances away from the sink and hot surfaces. If extension cords are needed, install wiring guides so they do not hang over the sink, range, or working areas. Look for coffee pots, kettles and toaster ovens with automatic shut-offs. Keep a mop handy in the kitchen so you can wipe up spills instantly. You should also have a small fire extinguisher. Arrange your kitchen with frequently used items on lower shelves to avoid the need to stand on a stepstool to reach them. Make sure countertops are well-lit to avoid injuries while cutting and preparing food. In the Bathroom    Use a non-slip mat or decals in the tub and shower, since wet, soapy tile or porcelain surfaces are extremely slippery. Make sure bathroom rugs are non-skid or tape them firmly to the floor. Bathtubs should have at least one, preferably two, grab bars, firmly attached to structural supports in the wall. (Do not use built-in soap holders or glass shower doors as grab bars.)    Tub seats fitted with non-slip material on the legs allow you to wash sitting down. For people with limited mobility, bathtub transfer benches allow you to slide safely into the tub. Raised toilet seats and toilet safety rails are helpful for those with knee or hip problems.     In the [facility]    Make sure you use a nightlight and that the area around your bed is clear of potential obstacles. Be careful with electric blankets and never go to sleep with a heating pad, which can cause serious burns even if on a low setting. Use fire-resistant mattress covers and pillows, and NEVER smoke in bed. Keep a phone next to the bed that is programmed to dial 911 at the push of a button. If you have a chronic condition, you may want to sign on with an automatic call-in service. Typically the system includes a small pendant that connects directly to an emergency medical voice-response system. You should also make arrangements to stay in contact with someonefriend, neighbor, family memberon a regular schedule. Fire Prevention   According to the [employer]. (Smoke Alarms for Every) [address] [place] [address], senior citizens are one of the two highest risk groups for death and serious injuries due to residential fires. When cooking, wear short-sleeved items, never a bulky long-sleeved robe. The [facility]'s Safety Checklist for Older Consumers emphasizes the importance of checking basements, garages, workshops and storage areas for fire hazards, such as volatile liquids, piles of old rags or clothing and overloaded circuits. Never smoke in bed or when lying down on a couch or recliner chair. Small portable electric or kerosene heaters are responsible for many home fires and should be used cautiously if at all. If you do use one, be sure to keep them away from flammable materials. In case of fire, make sure you have a pre-established emergency exit plan. Have a professional check your fireplace and other fuel-burning appliances yearly. Hel[employer]   Baby boomers entering the hoang years will continue to see the development of new products to help older adults live safely and independently in spite of age-related changes.   Making Life More Livable  , by Donis Boas, lists over 1,000 products for \"living well in the mature years,\" such as bathing and mobility aids, household security devices, ergonomically designed knives and peelers, and faucet valves and knobs for temperature control. Medical supply stores and organizations are good sources of information about products that improve your quality of life and insure your safety. Last Reviewed: November 2009 Zahraa Martinez MD   Updated: 3/7/2011            Advance Care Planning: Care Instructions  Your Care Instructions     It can be hard to live with an illness that cannot be cured. But if your health is getting worse, you may want to make decisions about end-of-life care. Planning for the end of your life does not mean that you are giving up. It is a way to make sure that your wishes are met. Clearly stating your wishes can make it easier for your loved ones. Making plans while you are still able may also ease your mind and make your final days less stressful and more meaningful. Follow-up care is a key part of your treatment and safety. Be sure to make and go to all appointments, and call your doctor if you are having problems. It's also a good idea to know your test results and keep a list of the medicines you take. What can you do to plan for the end of life? You can bring these issues up with your doctor. You do not need to wait until your doctor starts the conversation. You might start with, \"What makes life worth living for me is. Kyler Gutter .\" And then follow it with, \"I would not be willing to live with . Kyler Gutter Kyler Gutter \" When you complete this sentence it helps your doctor understand your wishes. Talk openly and honestly with your doctor. This is the best way to understand the decisions you will need to make as your health changes. Know that you can always change your mind. Ask your doctor about commonly used life-support measures. These include tube feedings, breathing machines, and fluids given through a vein (IV). Understanding these treatments will help you decide whether you want them.   You may choose to have these life-supporting treatments for a limited time. This allows a trial period to see whether they will help you. You may also decide that you want your doctor to take only certain measures to keep you alive. It may help to think about the big picture, like what makes life worth living for you or what your values and goals are. Talk to your doctor about how long you are likely to live. Your doctor may be able to give you an idea of what usually happens with your specific illness. Think about preparing papers that state your wishes. These papers are called advance directives. If you do this early and review them often, there will not be any confusion about what you want. You can change your instructions at any time. Which papers should you prepare? Advance directives are legal papers that tell doctors how you want to be cared for at the end of your life. You do not need a  to write these papers. Ask your doctor or your state [facility] department for information on how to write your advance directives. They may have the forms for each of these types of papers. Make sure your doctor has a copy of these on file, and give a copy to a family member or close friend. Consider a do-not-resuscitate order (DNR). This order asks that no extra treatments be done if your heart stops or you stop breathing. Extra treatments may include cardiopulmonary resuscitation (CPR), electrical shock to restart your heart, or a machine to breathe for you. If you decide to have a DNR order, ask your doctor to explain and write it. Place the order in your home where everyone can easily see it. Consider a living will. A living will explains your wishes about life support and other treatments at the end of your life if you become unable to speak for yourself. Living coreas tell doctors to use or not use treatments that would keep you alive. You must have one or two witnesses or a notary present when you sign this form.  A living will may be called something else in your state. Consider a medical power of . This form allows you to name a person to make decisions about your care if you are not able to. Most people ask a close friend or family member. Talk to this person about the kinds of treatments you want and those that you do not want. Make sure this person understands your wishes. A medical power of  may be called something else in your state. These legal papers are simple to change. Tell your doctor what you want to change, and ask him or her to make a note in your medical file. Give your family updated copies of the papers. Where can you learn more? Go to http://www.mackey.com/ and enter P184 to learn more about \"Advance Care Planning: Care Instructions. \"  Current as of: October 6, 2021               Content Version: 13.5  © 4676-4985 [employer]wise, Incorporated. Care instructions adapted under license by [facility] ([facility]). If you have questions about a medical condition or this instruction, always ask your healthcare professional. [address] any warranty or liability for your use of this information. Zarbee's cough syrup over the counter; homeopathic syrup.

## 2023-02-17 NOTE — PROGRESS NOTES
Medicare Annual Wellness Visit    Natasha Hope is here for Medicare AWV    Assessment & Plan   History of diabetes mellitus, type II  -     AMB POC HEMOGLOBIN A1C  -     Microalbumin / Creatinine Urine Ratio; Future  Elevated LDL cholesterol level  -     Lipid Panel; Future  Essential hypertension  -     Basic Metabolic Panel; Future  -     CBC; Future  Vasomotor symptoms due to menopause  -     cloNIDine (CATAPRES) 0.1 MG tablet; Take 1 tablet by mouth 2 times daily, Disp-60 tablet, R-8Normal  Medicare annual wellness visit, subsequent  Allergic rhinitis, unspecified seasonality, unspecified trigger  -     cetirizine (ZYRTEC) 10 MG tablet; Take 1 tablet by mouth daily, Disp-30 tablet, R-0Normal  -     fluticasone (FLONASE) 50 MCG/ACT nasal spray; 2 sprays by Each Nostril route daily, Disp-16 g, R-0Normal  Advanced care planning/counseling discussion  -     [address] - Referral to ACP Clinical Specialist   Declines pain management does not want opioid. Recommend cane standard to help with falls. Recommendations for Preventive Services Due: see orders and patient instructions/AVS.  Recommended screening schedule for the next 5-10 years is provided to the patient in written form: see Patient Instructions/AVS.     Return in 4 months (on 6/17/2023) for Medicare Annual Wellness Visit in 1 year. Subjective   The following acute and/or chronic problems were also addressed today:  AWV. Has some mild congestion, runny nose, and cough. No fever. Saw ophthalmology and was recommended allergy eye drops. Patient's complete Health Risk Assessment and screening values have been reviewed and are found in Flowsheets. The following problems were reviewed today and where indicated follow up appointments were made and/or referrals ordered.     Positive Risk Factor Screenings with Interventions:    Fall Risk:  Do you feel unsteady or are you worried about falling? : no  2 or more falls in past year?: (!) yes  Fall with injury in past year?: no     Interventions:    Agrees to use standard cane prn when feeling weaker/tired.  Has walker but too bulky.  See AVS for additional education material                    Advanced Directives:  Do you have a Living Will?: (!) No    Intervention:  has NO advanced directive  - referred to ACP Coordinator             Declines vaccines today.  Eye exam completed recently.  Denies smoking; remains smoke free.       Review of Systems   Constitutional:  Negative for fever.   HENT:  Positive for congestion and rhinorrhea. Negative for ear pain and sore throat.    Eyes:  Positive for itching.   Respiratory:  Positive for cough. Negative for shortness of breath and wheezing.         Thick beige mucous.   Cardiovascular:  Negative for chest pain, palpitations and leg swelling.   Endocrine: Negative for heat intolerance.   Allergic/Immunologic: Positive for environmental allergies.   Psychiatric/Behavioral:  Negative for dysphoric mood.              Objective   Vitals:    02/17/23 0811   BP: 106/64   Pulse: 90   SpO2: 97%   Weight: 171 lb (77.6 kg)   Height: 5' 6\" (1.676 m)      Body mass index is 27.6 kg/m².        Physical Exam  Constitutional:       General: She is not in acute distress.     Appearance: Normal appearance. She is normal weight. She is not ill-appearing, toxic-appearing or diaphoretic.   HENT:      Head: Normocephalic and atraumatic.      Right Ear: Tympanic membrane, ear canal and external ear normal.      Left Ear: Tympanic membrane, ear canal and external ear normal.      Nose: Congestion present. No rhinorrhea.      Mouth/Throat:      Mouth: Mucous membranes are moist.      Pharynx: Oropharynx is clear.      Comments: Mild irritation  Eyes:      Extraocular Movements: Extraocular movements intact.      Conjunctiva/sclera: Conjunctivae normal.      Pupils: Pupils are equal, round, and reactive to light.   Cardiovascular:      Rate and Rhythm: Normal rate and regular rhythm.       Pulses: Normal pulses. Heart sounds: Normal heart sounds. Pulmonary:      Effort: Pulmonary effort is normal.      Breath sounds: Normal breath sounds. Abdominal:      General: There is no distension. Musculoskeletal:         General: Normal range of motion. Cervical back: Normal range of motion. Right lower leg: No edema. Left lower leg: No edema. Skin:     General: Skin is warm and dry. Coloration: Skin is not jaundiced or pale. Neurological:      General: No focal deficit present. Mental Status: She is alert and oriented to person, place, and time. Psychiatric:         Mood and Affect: Mood normal.         Behavior: Behavior normal.         Thought Content: Thought content normal.         Judgment: Judgment normal.            No Known Allergies  Prior to Visit Medications    Medication Sig Taking? Authorizing Provider   cloNIDine (CATAPRES) 0.1 MG tablet Take 1 tablet by mouth 2 times daily Yes ERIK Sun CNP   cetirizine (ZYRTEC) 10 MG tablet Take 1 tablet by mouth daily Yes ERIK Sun CNP   fluticasone (FLONASE) 50 MCG/ACT nasal spray 2 sprays by Each Nostril route daily Yes ERIK Sun CNP   clonazePAM (KLONOPIN) 1 MG tablet Take 1 tablet by mouth 2 times daily as needed for Anxiety for up to 30 days. Yes ERIK Landry CNP   zolpidem (AMBIEN) 10 MG tablet Take 1 tablet by mouth nightly as needed for Sleep for up to 30 days. Yes ERIK Landry CNP   divalproex (DEPAKOTE ER) 250 MG extended release tablet Take 1 tablet by mouth at bedtime Take with the two 500 mg tablets to equal 1250 mg Q HS.  Yes ERIK Landry CNP   divalproex (DEPAKOTE ER) 500 MG extended release tablet Take 2 tablets by mouth at bedtime Yes ERIK Landry CNP   propranolol (INDERAL LA) 60 MG extended release capsule Take 1 capsule by mouth daily Yes Germán Brasher DO   Atogepant (QULIPTA) 60 MG TABS Take one a day Yes Germán Brasher DO Handicap Placard MISC by Does not apply route M54.9, G89.29, M48.062 Yes ERIK Corrigan CNP   ondansetron (ZOFRAN-ODT) 4 MG disintegrating tablet Take 1 tablet by mouth every 12 hours as needed for Nausea or Vomiting Yes ERIK Corrigan CNP   tamsulosin (FLOMAX) 0.4 MG capsule Take 1 capsule by mouth in the morning and at bedtime Yes ERIK Jiang NP   Onabotulinumtoxin A (BOTOX) 200 units injection Inject 200 Units into the muscle every 3 months Yes Shelli Yu MD   lisinopril (PRINIVIL;ZESTRIL) 20 MG tablet Take 1 tablet by mouth in the morning. Yes ERIK Corrigan CNP   omeprazole (PRILOSEC) 40 MG delayed release capsule Take 1 capsule by mouth in the morning. Yes ERIK Corrigan CNP   gabapentin (NEURONTIN) 300 MG capsule Take 2 capsules by mouth in the morning and 2 capsules at noon and 2 capsules before bedtime. Do all this for 180 days. Yes ERIK Corrigan CNP   cyanocobalamin 100 MCG tablet Take 100 mcg by mouth daily Yes Ar Automatic Reconciliation   naloxone 4 MG/0.1ML LIQD nasal spray Use 1 spray intranasally, then discard. Repeat with new spray every 2 min as needed for opioid overdose symptoms, alternating nostrils. Yes Ar Automatic Reconciliation   topiramate (TOPAMAX) 25 MG tablet Take 75 mg by mouth 2 times daily (with meals)  Patient not taking: Reported on 2/17/2023  Randall Fernando DO   tamsulosin (FLOMAX) 0.4 MG capsule Take 1 capsule by mouth in the morning.   Patient not taking: Reported on 2/17/2023  ERIK Corrigan CNP       CareTeam (Including outside providers/suppliers regularly involved in providing care):   Patient Care Team:  ERIK Corrigan CNP as PCP - [address], APRN - CNP as PCP - Empaneled Provider     Reviewed and updated this visit:  Allergies  Meds  Med Hx  Surg Hx  Soc Hx  Fam Hx             ERIK Corrigan CNP

## 2023-02-18 RX ORDER — ATORVASTATIN CALCIUM 10 MG/1
10 TABLET, FILM COATED ORAL DAILY
Qty: 30 TABLET | Refills: 5 | Status: SHIPPED | OUTPATIENT
Start: 2023-02-18

## 2023-02-18 NOTE — RESULT ENCOUNTER NOTE
Cholesterol is elevated, triglycerides elevated, and LDL bad cholesterol elevated. I sent in atorvastatin 10 mg orally daily to help with her cholesterol; recommend high fiber diet and diet low in saturated fat/cholesterol. Recommend regular cardiac exercise. Please let me know if patient has any further questions or concerns.

## 2023-02-20 DIAGNOSIS — I10 ESSENTIAL HYPERTENSION: ICD-10-CM

## 2023-02-20 DIAGNOSIS — M54.50 CHRONIC MIDLINE LOW BACK PAIN WITHOUT SCIATICA: ICD-10-CM

## 2023-02-20 DIAGNOSIS — G89.29 CHRONIC MIDLINE LOW BACK PAIN WITHOUT SCIATICA: ICD-10-CM

## 2023-02-20 DIAGNOSIS — M79.2 NEUROPATHIC PAIN: ICD-10-CM

## 2023-02-20 RX ORDER — LISINOPRIL 20 MG/1
20 TABLET ORAL DAILY
Qty: 90 TABLET | Refills: 1 | Status: SHIPPED | OUTPATIENT
Start: 2023-02-20

## 2023-02-20 RX ORDER — GABAPENTIN 300 MG/1
600 CAPSULE ORAL 3 TIMES DAILY
Qty: 540 CAPSULE | Refills: 1 | Status: SHIPPED | OUTPATIENT
Start: 2023-02-20 | End: 2023-08-19

## 2023-02-20 NOTE — TELEPHONE ENCOUNTER
Received call from patient, who states that she needs 2 medications sent in: Gabapentin & Lisinopril. She uses [address] [facility] in [place]. [facility] spoke to [place], who confirms that no refills remain on either medication. Medications pended & sent to Dr. Avery Steen to sign in Le's absence from office.

## 2023-02-21 ENCOUNTER — TELEPHONE (OUTPATIENT)
Dept: CARDIOTHORACIC SURGERY | Age: 48
End: 2023-02-21

## 2023-02-21 ENCOUNTER — CARE COORDINATION (OUTPATIENT)
Dept: CARE COORDINATION | Facility: CLINIC | Age: 48
End: 2023-02-21

## 2023-02-21 DIAGNOSIS — Q23.1 BICUSPID AORTIC VALVE: Primary | ICD-10-CM

## 2023-02-21 NOTE — TELEPHONE ENCOUNTER
CTS-pt was contacted with CTA results. Ascending aorta diameter has been unchanged since 2016. She was last seen in our office in 2020 and was lost to follow up with cardiology. It was recommended then that she arrange follow up with cardiology for serial echocardiograms for her bicuspid AV. She states she never heard back from [facility] after referral. I sent a new referral today and contacted [facility] myself. She has a follow up appointment with Dr Pantera Farmer on March 28th in [facility]. Pt was called back with appointment info. If echocardiogram ok, will plan repeat CTA in 2 years. Marisela Cortez.  KRISTYN Singh

## 2023-02-21 NOTE — PROGRESS NOTES
CTS-pt was contacted with CTA results. Ascending aorta diameter has been unchanged since 2016. She was last seen in our office in 2020 and was lost to follow up with cardiology. It was recommended then that she arrange follow up with cardiology for serial echocardiograms for her bicuspid AV. She states she never heard back from [facility] after referral. I sent a new referral today and contacted [facility] myself. She has a follow up appointment with Dr Meme Miguel on March 28th in [facility]. Pt was called back with appointment info. If echocardiogram ok, will plan repeat CTA in 2 years. Tr Lopez.  KRISTYN Singh

## 2023-02-21 NOTE — ACP (ADVANCE CARE PLANNING)
Advance Care Planning     Advance Care Planning Clinical Specialist  Conversation Note      Date of ACP Conversation: 2/21/2023    [facility] Conducted with: Patient with Decision Making Capacity    ACP Clinical Specialist: Gautam Tamayo Decision Maker:     Current Designated Healthcare Decision Maker:     Primary Decision Maker: Donato Nair - Spouse - [phone]    Care Preferences    Hospitalization: \"If your health worsens and it becomes clear that your chance of recovery is unlikely, what would your preference be regarding hospitalization? \"    Choice:  [] The patient wants hospitalization. [x] The patient prefers comfort-focused treatment without hospitalization. \"I want to be home. \"    Ventilation: \"If you were in your present state of health and suddenly became very ill and were unable to breathe on your own, what would your preference be about the use of a ventilator (breathing machine) if it were available to you? \"      If the patient would desire the use of ventilator (breathing machine), answer \"yes\". If not, \"no\": yes  \"I would like to be put on a ventilator if if looks like I'm going to get better but not if I'm going to die anyway. I don't want to be kept alive by a ventilator. \"    \"If your health worsens and it becomes clear that your chance of recovery is unlikely, what would your preference be about the use of a ventilator (breathing machine) if it were available to you? \"     Would the patient desire the use of ventilator (breathing machine)?: No      Resuscitation  \"CPR works best to restart the heart when there is a sudden event, like a heart attack, in someone who is otherwise healthy. Unfortunately, CPR does not typically restart the heart for people who have serious health conditions or who are very sick. \"    \"In the event your heart stopped as a result of an underlying serious health condition, would you want attempts to be made to restart your heart (answer \"yes\" for attempt to resuscitate) or would you prefer a natural death (answer \"no\" for do not attempt to resuscitate)? \" yes       [] Yes   [x] No   Educated Patient / Chriskeke Neff regarding differences between Advance Directives and portable DNR orders. Length of ACP Conversation in minutes:  15 min    Conversation Outcomes:  [x] ACP discussion completed  [] Existing advance directive reviewed with patient; no changes to patient's previously recorded wishes  [] New Advance Directive completed  [] Portable Do Not Rescitate prepared for Provider review and signature  [] POLST/POST/MOLST/MOST prepared for Provider review and signature      Follow-up plan:    [] Schedule follow-up conversation to continue planning  [] Referred individual to Provider for additional questions/concerns   [] Advised patient/agent/surrogate to review completed ACP document and update if needed with changes in condition, patient preferences or care setting    [x] This note routed to one or more involved healthcare providers    A HCPOA document will be mailed to pt. NOEL ROMAN will assist with completion. Instructed to call NOEL ROMAN when she receives document.  Outreach March 3 if no call received from pt

## 2023-03-09 ENCOUNTER — OFFICE VISIT (OUTPATIENT)
Dept: NEUROLOGY | Age: 48
End: 2023-03-09

## 2023-03-09 VITALS
BODY MASS INDEX: 27.48 KG/M2 | HEIGHT: 66 IN | SYSTOLIC BLOOD PRESSURE: 120 MMHG | WEIGHT: 171 LBS | DIASTOLIC BLOOD PRESSURE: 78 MMHG

## 2023-03-09 DIAGNOSIS — G43.709 CHRONIC MIGRAINE W/O AURA W/O STATUS MIGRAINOSUS, NOT INTRACTABLE: Primary | ICD-10-CM

## 2023-03-09 NOTE — PROGRESS NOTES
[place] [address] [address] [facility]  Phone: [phone] Fax [phone]  Dr. Ale Castillo        Patient: Lilian Agudelo  Provider: Ale Castillo MD     Procedure note: Botulinum Toxin injections     Indication: Chronic Migraine        Subjective:      Patient presents for chemodenervation for chronic migraine. She was last seen December 2022. Injection was met with benefit with reduced headache severity and frequency. Greater than 50% reduction of headache days reported. She has not noticed much in the way of any wearing off before the next injection. No adverse effects. Headaches are described as diffuse and bitemporal head pain. She continues to follow-up with Dr. Beau Yen for medication management. Past medical history, surgical history, social history, family history, medications and allergies were all reviewed and updated as appropriate. Current Outpatient Medications on File Prior to Visit   Medication Sig Dispense Refill    gabapentin (NEURONTIN) 300 MG capsule Take 2 capsules by mouth 3 times daily for 180 days. 540 capsule 1    lisinopril (PRINIVIL;ZESTRIL) 20 MG tablet Take 1 tablet by mouth daily 90 tablet 1    atorvastatin (LIPITOR) 10 MG tablet Take 1 tablet by mouth daily 30 tablet 5    cloNIDine (CATAPRES) 0.1 MG tablet Take 1 tablet by mouth 2 times daily 60 tablet 8    cetirizine (ZYRTEC) 10 MG tablet Take 1 tablet by mouth daily 30 tablet 0    fluticasone (FLONASE) 50 MCG/ACT nasal spray 2 sprays by Each Nostril route daily 16 g 0    clonazePAM (KLONOPIN) 1 MG tablet Take 1 tablet by mouth 2 times daily as needed for Anxiety for up to 30 days. 60 tablet 2    zolpidem (AMBIEN) 10 MG tablet Take 1 tablet by mouth nightly as needed for Sleep for up to 30 days. 30 tablet 2    divalproex (DEPAKOTE ER) 250 MG extended release tablet Take 1 tablet by mouth at bedtime Take with the two 500 mg tablets to equal 1250 mg Q HS.  30 tablet 3 divalproex (DEPAKOTE ER) 500 MG extended release tablet Take 2 tablets by mouth at bedtime 60 tablet 3    propranolol (INDERAL LA) 60 MG extended release capsule Take 1 capsule by mouth daily 90 capsule 3    Atogepant (QULIPTA) 60 MG TABS Take one a day 30 tablet 11    Handicap Placard MISC by Does not apply route M54.9, G89.29, M48.062 1 each 0    ondansetron (ZOFRAN-ODT) 4 MG disintegrating tablet Take 1 tablet by mouth every 12 hours as needed for Nausea or Vomiting 60 tablet 0    tamsulosin (FLOMAX) 0.4 MG capsule Take 1 capsule by mouth in the morning and at bedtime 180 capsule 3    Onabotulinumtoxin A (BOTOX) 200 units injection Inject 200 Units into the muscle every 3 months 1 each 3    topiramate (TOPAMAX) 25 MG tablet Take 75 mg by mouth 2 times daily (with meals) (Patient not taking: Reported on 2/17/2023) 180 tablet 5    omeprazole (PRILOSEC) 40 MG delayed release capsule Take 1 capsule by mouth in the morning. 30 capsule 5    tamsulosin (FLOMAX) 0.4 MG capsule Take 1 capsule by mouth in the morning. (Patient not taking: Reported on 2/17/2023) 30 capsule 5    cyanocobalamin 100 MCG tablet Take 100 mcg by mouth daily      naloxone 4 MG/0.1ML LIQD nasal spray Use 1 spray intranasally, then discard. Repeat with new spray every 2 min as needed for opioid overdose symptoms, alternating nostrils.       No current facility-administered medications on file prior to visit.         Exam:      Visit Vitals  Vitals:    03/09/23 1055   BP: 120/78       General Exam:  General - Well developed, well nourished, in no apparent distress.   HENT - Normocephalic, atraumatic. Oropharynx clear.   Eyes - Sclera anicteric.  External Ears - Appear normal.   Neck - No visualized mass.   Lungs - Breathing is non-labored.   Skin - No significant lesions or discoloration noted.    Musculoskeletal - Moves all extremities. No deformities noted.   Psychiatric - Normal affect. No hallucinations.      Neurological Exam:     MS/Language/Speech - Alert. Attentive and cooperative. Language fluent. Speech is clear. Cranial Nerves - Eye movements full. No nystagmus. No facial asymmetry. Tongue was midline. Shoulder shrug symmetric. Motor - Moves all extremities well with no apparent limitations. No tremor present. Cerebellar - No ataxia or dysmetria. Gait - Deferred. Assessment/Plan:      Linnea Lew is a 48 y. o.female who presents for chemodenervation for chronic migraine. Feli Whipple was seen today for follow-up. Diagnoses and all orders for this visit:    Chronic migraine w/o aura w/o status migrainosus, not intractable  -     Onabotulinumtoxin A (BOTOX) injection 200 Units  -     92591 - Chemodenervation of muscle(s): innervated by facial, trigeminal, cervical spinal and accessory nerves, bilateral      Proceed with botulinum toxin injections as noted below. RTC 3 months for review and re-injection. Procedure:       Consent: Written consent obtained after the potential risks and benefits have been explained to the patient. Potential risks include:  Pain, bruising, bleeding, infection, flu-like symptoms, over-weakening of injected or adjacent muscles and swallowing dysfunction. Patient was given the botulinum toxin medication guide according to FDA standards. Technique: Botox A 200 unit was dissolved into non-preservative normal saline 4 ml. Gauge 30 facial needles were used for the injection. Procedure: Five units/ 0.1 ml per site unless specified. Occipitalis R3 L3. Cervical paraspinal R2 L2. Trapezius R3 L3. Temporalis R4+1 L4+1. Frontalis R2+1 L2+1. Procerus 1. Corrugators R1 L1. Total injected: 175 units BOTOX 100units/2 cc. Waste: 25 units     Comments: There were no complications. The patient tolerated the procedure well.         Signature: Bronson Melendez MD  Date: 3/9/23  [facility]   [address], [facility], [address] [place] [address]  Ph: [phone]  Fax: [phone]       I have personally interviewed and examined Mrs. Kinga Degroot and I have personally reviewed all relevant records including labs and imaging as noted above. I have written all aspects of this note. More than 50% of this time was used for counseling regarding my diagnosis, prognosis, and plans for management.    Procedure Time: 10 minutes  Total visit time (including procedures): N/A

## 2023-03-09 NOTE — LETTER
Informed Consent for Botulinumtoxina  (Botox)                                                                  [id number]    Patient Name: Cash Caraballo             :_1975    I give consent for the following procedure(s)  Botulinumtoxina (Botox) injections to be performed by:    [x]   Dr. Oni Smith    []   Godfrey Alexander NP    []   Dr. Ricardo Valdes    []   Dr. Dane Luna    I understand that my provider will have the main responsibility for my care specific to procedure. I understand the the doctor may be fellows, residents or other qualified first assistant's may be involved in my procedure under the supervision of the above provider. My provider has explained to me my condition, the nature and purpose of each procedure, and the risks and types of complications involved, the potential benefits and drawbacks, potential problems related to recovery, the likelihood of success, the possible results of non-treatment,and the reasonable options or alternatives. I have had the chance to ask questions about the planned procedure and all my questions have been answered to my satisfaction. I have received no promises from anyone about the results that may be obtained from the procedure. All procedures involve risk. Routine risks include, but are not limited to, perforation and/or injury to nearby blood vessels, nerve and/or organs, infection, blood clots. Other risks may include paralysis and/or _____________________________________. I understand that the information I have received about the risks may not be fully completed and other less common risks.     ________________________________________Date:_______________  Patient Signature    ________________________________________Date:_______________  Provider Signature                         _______________________________________ Date:_______________             Witness Signature

## 2023-04-03 PROBLEM — E11.9 CONTROLLED TYPE 2 DIABETES MELLITUS WITHOUT COMPLICATION, WITH LONG-TERM CURRENT USE OF INSULIN (HCC): Status: RESOLVED | Noted: 2018-11-12 | Resolved: 2021-08-20

## 2023-04-03 PROBLEM — E11.9 TYPE 2 DIABETES MELLITUS (HCC): Status: RESOLVED | Noted: 2022-01-28 | Resolved: 2022-02-11

## 2023-04-03 PROBLEM — Z79.4 CONTROLLED TYPE 2 DIABETES MELLITUS WITHOUT COMPLICATION, WITH LONG-TERM CURRENT USE OF INSULIN (HCC): Status: RESOLVED | Noted: 2018-11-12 | Resolved: 2021-08-20

## 2023-04-03 PROBLEM — E11.51 TYPE 2 DIABETES MELLITUS WITH PERIPHERAL VASCULAR DISEASE (HCC): Status: RESOLVED | Noted: 2019-11-04 | Resolved: 2021-08-20

## 2023-04-28 ENCOUNTER — TELEMEDICINE (OUTPATIENT)
Dept: BEHAVIORAL/MENTAL HEALTH CLINIC | Age: 48
End: 2023-04-28
Payer: MEDICARE

## 2023-04-28 DIAGNOSIS — F41.1 GAD (GENERALIZED ANXIETY DISORDER): Primary | ICD-10-CM

## 2023-04-28 DIAGNOSIS — F51.04 INSOMNIA, PSYCHOPHYSIOLOGICAL: ICD-10-CM

## 2023-04-28 DIAGNOSIS — F31.75 BIPOLAR 1 DISORDER, DEPRESSED, PARTIAL REMISSION (HCC): ICD-10-CM

## 2023-04-28 PROCEDURE — 99215 OFFICE O/P EST HI 40 MIN: CPT | Performed by: NURSE PRACTITIONER

## 2023-04-28 RX ORDER — ZOLPIDEM TARTRATE 10 MG/1
10 TABLET ORAL NIGHTLY PRN
Qty: 30 TABLET | Refills: 2 | Status: SHIPPED | OUTPATIENT
Start: 2023-05-21 | End: 2023-06-20

## 2023-04-28 RX ORDER — DIVALPROEX SODIUM 250 MG/1
250 TABLET, EXTENDED RELEASE ORAL NIGHTLY
Qty: 30 TABLET | Refills: 3 | Status: SHIPPED | OUTPATIENT
Start: 2023-04-28 | End: 2023-05-28

## 2023-04-28 RX ORDER — CLONAZEPAM 1 MG/1
1 TABLET ORAL 2 TIMES DAILY PRN
Qty: 60 TABLET | Refills: 2 | Status: SHIPPED | OUTPATIENT
Start: 2023-05-21 | End: 2023-06-20

## 2023-04-28 RX ORDER — DIVALPROEX SODIUM 500 MG/1
1000 TABLET, EXTENDED RELEASE ORAL NIGHTLY
Qty: 60 TABLET | Refills: 3 | Status: SHIPPED | OUTPATIENT
Start: 2023-04-28 | End: 2023-05-28

## 2023-04-28 NOTE — PROGRESS NOTES
OUTPATIENT PSYCHIATRIC RETURN VISIT PROGRESS NOTE    I was in the office While conducting this encounter. Pt was at home    She and/ or her healthcare decision maker is aware that this patient-initialed Telehealth encounter is a billable service with coverage as determined by her insurance carrier. She is aware that she may receive a bill and has provided verbal consent to proceed: Yes    The Virtual visit was conducted via [address] Pursuant to the emergency declaration under the [facility], [facility] waiver authority and the [employer] Act, this Virtual  Visit was conducted to reduce the patient's risk of exposure to COVID-19 and provide continuity of care for an established patient. Services were provided through a video synchronous discussion virtually to substitute for in-person clinic visit. Due to this being a TeleHealth evaluation, many elements of the physical examination are unable to be assessed. Total Time:  minutes: 40-54 minutes         Date of Service: 4/28/2023     Identification    Antonio Villarreal is a 50 y.o.  with a past psychiatric history of bipolar d/o, anxiety, insomnia  who presents today for a psychiatric follow up appointment. CC:  Routine medication management follow up. No chief complaint on file. Subjective / Interval History:    Pt  visit via My Chart today and reports that there are some situational stressors with her  needing serious back surgeries, having a lot of pain,falls. 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 Encouraged her to to seek help so that she can get an occasional break , even if for an hour or two and she agrees. His back surgery is scheduled for the end of May. Daughter moved out, which pt says \"was a good thing\". Medications remain effective with no adverse effects. No depressive or manic episodes.  PRN Klonopin effective for

## 2023-05-09 ENCOUNTER — HOSPITAL ENCOUNTER (EMERGENCY)
Age: 48
Discharge: HOME | End: 2023-05-09
Attending: EMERGENCY MEDICINE
Payer: MEDICARE

## 2023-05-09 ENCOUNTER — APPOINTMENT (OUTPATIENT)
Dept: GENERAL RADIOLOGY | Age: 48
End: 2023-05-09
Payer: MEDICARE

## 2023-05-09 VITALS
DIASTOLIC BLOOD PRESSURE: 105 MMHG | OXYGEN SATURATION: 100 % | BODY MASS INDEX: 25.71 KG/M2 | TEMPERATURE: 98.1 F | HEIGHT: 66 IN | HEART RATE: 95 BPM | SYSTOLIC BLOOD PRESSURE: 154 MMHG | WEIGHT: 160 LBS | RESPIRATION RATE: 18 BRPM

## 2023-05-09 DIAGNOSIS — S61.012A LACERATION OF LEFT THUMB WITHOUT FOREIGN BODY WITHOUT DAMAGE TO NAIL, INITIAL ENCOUNTER: Primary | ICD-10-CM

## 2023-05-09 PROCEDURE — 6370000000 HC RX 637 (ALT 250 FOR IP)

## 2023-05-09 PROCEDURE — 90471 IMMUNIZATION ADMIN: CPT

## 2023-05-09 PROCEDURE — 90714 TD VACC NO PRESV 7 YRS+ IM: CPT

## 2023-05-09 PROCEDURE — 73130 X-RAY EXAM OF HAND: CPT

## 2023-05-09 PROCEDURE — 99284 EMERGENCY DEPT VISIT MOD MDM: CPT

## 2023-05-09 PROCEDURE — 6360000002 HC RX W HCPCS

## 2023-05-09 PROCEDURE — 12001 RPR S/N/AX/GEN/TRNK 2.5CM/<: CPT

## 2023-05-09 RX ORDER — IBUPROFEN 800 MG/1
800 TABLET ORAL
Status: COMPLETED | OUTPATIENT
Start: 2023-05-09 | End: 2023-05-09

## 2023-05-09 RX ORDER — TETANUS AND DIPHTHERIA TOXOIDS ADSORBED 2; 2 [LF]/.5ML; [LF]/.5ML
0.5 INJECTION INTRAMUSCULAR
Status: COMPLETED | OUTPATIENT
Start: 2023-05-09 | End: 2023-05-09

## 2023-05-09 RX ADMIN — IBUPROFEN 800 MG: 800 TABLET, FILM COATED ORAL at 21:07

## 2023-05-09 RX ADMIN — TETANUS AND DIPHTHERIA TOXOIDS ADSORBED 0.5 ML: 2; 2 INJECTION INTRAMUSCULAR at 21:08

## 2023-05-09 ASSESSMENT — PAIN SCALES - GENERAL
PAINLEVEL_OUTOF10: 10
PAINLEVEL_OUTOF10: 10

## 2023-05-09 ASSESSMENT — PAIN - FUNCTIONAL ASSESSMENT: PAIN_FUNCTIONAL_ASSESSMENT: 0-10

## 2023-05-09 ASSESSMENT — LIFESTYLE VARIABLES
HOW MANY STANDARD DRINKS CONTAINING ALCOHOL DO YOU HAVE ON A TYPICAL DAY: PATIENT DOES NOT DRINK
HOW OFTEN DO YOU HAVE A DRINK CONTAINING ALCOHOL: NEVER

## 2023-05-09 ASSESSMENT — PAIN DESCRIPTION - LOCATION: LOCATION: HAND

## 2023-05-09 ASSESSMENT — PAIN DESCRIPTION - ORIENTATION: ORIENTATION: LEFT

## 2023-05-09 NOTE — ED TRIAGE NOTES
Patient to ER from home with laceration to heel of left hand at base of thumb from knife while slicing onion, pt state she felt it go to the bone.

## 2023-05-10 NOTE — DISCHARGE INSTRUCTIONS
Please keep wound clean and dry. Monitor for signs of infection to include redness, swelling, drainage, tenderness or warmth at the site. Return to the emergency department for any concerns.

## 2023-05-10 NOTE — ED PROVIDER NOTES
Pulse: 95   Resp: 18   Temp: 98.1 °F (36.7 °C)   SpO2: 100%   Weight: 160 lb (72.6 kg)   Height: 5' 6\" (1.676 m)      Physical Exam  Vitals and nursing note reviewed. Constitutional:       Appearance: Normal appearance. Musculoskeletal:         General: Tenderness present. Normal range of motion. Hands:    Skin:     General: Skin is warm. Capillary Refill: Capillary refill takes less than 2 seconds. Neurological:      General: No focal deficit present. Mental Status: She is alert and oriented to person, place, and time.         Procedures     Lac Repair    Date/Time: 5/9/2023 9:09 PM  Performed by: ERIK Perkins - CNP  Authorized by: Shruthi Shepherd MD     Consent:     Consent obtained:  Verbal    Consent given by:  Patient    Risks discussed:  Infection and pain  Universal protocol:     Procedure explained and questions answered to patient or proxy's satisfaction: yes      Relevant documents present and verified: yes      Test results available: yes      Patient identity confirmed:  Verbally with patient  Anesthesia:     Anesthesia method:  None  Laceration details:     Location:  Finger    Finger location:  L thumb  Exploration:     Imaging obtained: x-ray    Treatment:     Area cleansed with:  Joanne    Amount of cleaning:  Standard  Skin repair:     Repair method:  Steri-Strips    Number of Steri-Strips:  3  Approximation:     Approximation:  Close  Repair type:     Repair type:  Simple    Orders Placed This Encounter   Procedures    LACERATION REPAIR    XR HAND LEFT (MIN 3 VIEWS)        Medications   ibuprofen (ADVIL;MOTRIN) tablet 800 mg (800 mg Oral Given 5/9/23 2107)   diptheria-tetanus toxoids (TDVAX) 2-2 LF/0.5ML injection 0.5 mL (0.5 mLs IntraMUSCular Given 5/9/23 2108)       Discharge Medication List as of 5/9/2023  9:20 PM           Past Medical History:   Diagnosis Date    Aneurysm ([facility] [address]     Aorta aneurysm measured 4.8/4.7cm on 10/06/2021, monitoring at

## 2023-05-10 NOTE — ED NOTES
Pt left before receiving paperwork. Pt was alert and ambulatory with  and in NAD.      Makayla Nolan RN  05/09/23 5818

## 2023-06-08 ENCOUNTER — TELEPHONE (OUTPATIENT)
Dept: NEUROLOGY | Age: 48
End: 2023-06-08

## 2023-06-08 NOTE — TELEPHONE ENCOUNTER
Botox 200 units  Intramuscularly  [facility] - on file  G43.709 Migraines

## 2023-06-09 DIAGNOSIS — G43.709 CHRONIC MIGRAINE W/O AURA W/O STATUS MIGRAINOSUS, NOT INTRACTABLE: Primary | ICD-10-CM

## 2023-06-19 ENCOUNTER — OFFICE VISIT (OUTPATIENT)
Dept: NEUROLOGY | Age: 48
End: 2023-06-19
Payer: MEDICARE

## 2023-06-19 ENCOUNTER — OFFICE VISIT (OUTPATIENT)
Dept: FAMILY MEDICINE CLINIC | Facility: CLINIC | Age: 48
End: 2023-06-19
Payer: MEDICARE

## 2023-06-19 VITALS — SYSTOLIC BLOOD PRESSURE: 134 MMHG | DIASTOLIC BLOOD PRESSURE: 82 MMHG | HEART RATE: 84 BPM

## 2023-06-19 VITALS
TEMPERATURE: 98.4 F | OXYGEN SATURATION: 98 % | WEIGHT: 175.2 LBS | HEART RATE: 76 BPM | DIASTOLIC BLOOD PRESSURE: 82 MMHG | BODY MASS INDEX: 28.16 KG/M2 | HEIGHT: 66 IN | SYSTOLIC BLOOD PRESSURE: 130 MMHG

## 2023-06-19 DIAGNOSIS — J30.9 ALLERGIC RHINITIS, UNSPECIFIED SEASONALITY, UNSPECIFIED TRIGGER: ICD-10-CM

## 2023-06-19 DIAGNOSIS — I10 ESSENTIAL HYPERTENSION: ICD-10-CM

## 2023-06-19 DIAGNOSIS — G43.709 CHRONIC MIGRAINE W/O AURA W/O STATUS MIGRAINOSUS, NOT INTRACTABLE: Primary | ICD-10-CM

## 2023-06-19 DIAGNOSIS — R11.0 NAUSEA: ICD-10-CM

## 2023-06-19 DIAGNOSIS — M79.2 NEUROPATHIC PAIN: ICD-10-CM

## 2023-06-19 DIAGNOSIS — E11.51 TYPE 2 DIABETES MELLITUS WITH DIABETIC PERIPHERAL ANGIOPATHY WITHOUT GANGRENE, WITHOUT LONG-TERM CURRENT USE OF INSULIN (HCC): ICD-10-CM

## 2023-06-19 DIAGNOSIS — G89.29 CHRONIC MIDLINE LOW BACK PAIN WITHOUT SCIATICA: ICD-10-CM

## 2023-06-19 DIAGNOSIS — E11.51 TYPE 2 DIABETES MELLITUS WITH DIABETIC PERIPHERAL ANGIOPATHY WITHOUT GANGRENE, WITHOUT LONG-TERM CURRENT USE OF INSULIN (HCC): Primary | ICD-10-CM

## 2023-06-19 DIAGNOSIS — E87.5 HYPERKALEMIA: ICD-10-CM

## 2023-06-19 DIAGNOSIS — M54.50 CHRONIC MIDLINE LOW BACK PAIN WITHOUT SCIATICA: ICD-10-CM

## 2023-06-19 DIAGNOSIS — E78.2 MIXED HYPERLIPIDEMIA: ICD-10-CM

## 2023-06-19 DIAGNOSIS — D69.6 THROMBOCYTOPENIA (HCC): ICD-10-CM

## 2023-06-19 DIAGNOSIS — F41.1 GAD (GENERALIZED ANXIETY DISORDER): ICD-10-CM

## 2023-06-19 DIAGNOSIS — K76.0 FATTY LIVER: ICD-10-CM

## 2023-06-19 DIAGNOSIS — F31.75 BIPOLAR 1 DISORDER, DEPRESSED, PARTIAL REMISSION (HCC): ICD-10-CM

## 2023-06-19 DIAGNOSIS — N95.1 VASOMOTOR SYMPTOMS DUE TO MENOPAUSE: ICD-10-CM

## 2023-06-19 DIAGNOSIS — K26.9 DUODENAL ULCER: ICD-10-CM

## 2023-06-19 DIAGNOSIS — K21.9 GASTROESOPHAGEAL REFLUX DISEASE WITHOUT ESOPHAGITIS: ICD-10-CM

## 2023-06-19 PROBLEM — Z98.1 STATUS POST LUMBAR SPINAL ARTHRODESIS: Status: RESOLVED | Noted: 2022-02-23 | Resolved: 2023-06-19

## 2023-06-19 PROBLEM — M48.062 LUMBAR STENOSIS WITH NEUROGENIC CLAUDICATION: Status: RESOLVED | Noted: 2022-02-23 | Resolved: 2023-06-19

## 2023-06-19 PROBLEM — R11.10 NON-INTRACTABLE VOMITING: Status: RESOLVED | Noted: 2021-08-03 | Resolved: 2023-06-19

## 2023-06-19 LAB
ALBUMIN SERPL-MCNC: 3.7 G/DL (ref 3.5–5)
ALBUMIN/GLOB SERPL: 1.1 (ref 0.4–1.6)
ALP SERPL-CCNC: 93 U/L (ref 50–136)
ALT SERPL-CCNC: 15 U/L (ref 12–65)
ANION GAP SERPL CALC-SCNC: 6 MMOL/L (ref 2–11)
AST SERPL-CCNC: 11 U/L (ref 15–37)
BILIRUB SERPL-MCNC: 0.3 MG/DL (ref 0.2–1.1)
BUN SERPL-MCNC: 23 MG/DL (ref 6–23)
CALCIUM SERPL-MCNC: 10.3 MG/DL (ref 8.3–10.4)
CHLORIDE SERPL-SCNC: 114 MMOL/L (ref 101–110)
CHOLEST SERPL-MCNC: 161 MG/DL
CO2 SERPL-SCNC: 25 MMOL/L (ref 21–32)
CREAT SERPL-MCNC: 1.1 MG/DL (ref 0.6–1)
ERYTHROCYTE [DISTWIDTH] IN BLOOD BY AUTOMATED COUNT: 12.8 % (ref 11.9–14.6)
GLOBULIN SER CALC-MCNC: 3.4 G/DL (ref 2.8–4.5)
GLUCOSE SERPL-MCNC: 138 MG/DL (ref 65–100)
HBA1C MFR BLD: 5.3 %
HCT VFR BLD AUTO: 44.4 % (ref 35.8–46.3)
HDLC SERPL-MCNC: 39 MG/DL (ref 40–60)
HDLC SERPL: 4.1
HGB BLD-MCNC: 14.5 G/DL (ref 11.7–15.4)
LDLC SERPL CALC-MCNC: 71.6 MG/DL
MCH RBC QN AUTO: 34.7 PG (ref 26.1–32.9)
MCHC RBC AUTO-ENTMCNC: 32.7 G/DL (ref 31.4–35)
MCV RBC AUTO: 106.2 FL (ref 82–102)
NRBC # BLD: 0 K/UL (ref 0–0.2)
PLATELET # BLD AUTO: 118 K/UL (ref 150–450)
PMV BLD AUTO: 13.5 FL (ref 9.4–12.3)
POTASSIUM SERPL-SCNC: 5.5 MMOL/L (ref 3.5–5.1)
PROT SERPL-MCNC: 7.1 G/DL (ref 6.3–8.2)
RBC # BLD AUTO: 4.18 M/UL (ref 4.05–5.2)
SODIUM SERPL-SCNC: 145 MMOL/L (ref 133–143)
TRIGL SERPL-MCNC: 252 MG/DL (ref 35–150)
VLDLC SERPL CALC-MCNC: 50.4 MG/DL (ref 6–23)
WBC # BLD AUTO: 6.2 K/UL (ref 4.3–11.1)

## 2023-06-19 PROCEDURE — G8427 DOCREV CUR MEDS BY ELIG CLIN: HCPCS | Performed by: NURSE PRACTITIONER

## 2023-06-19 PROCEDURE — 99214 OFFICE O/P EST MOD 30 MIN: CPT | Performed by: NURSE PRACTITIONER

## 2023-06-19 PROCEDURE — 3079F DIAST BP 80-89 MM HG: CPT | Performed by: NURSE PRACTITIONER

## 2023-06-19 PROCEDURE — G8419 CALC BMI OUT NRM PARAM NOF/U: HCPCS | Performed by: NURSE PRACTITIONER

## 2023-06-19 PROCEDURE — 64615 CHEMODENERV MUSC MIGRAINE: CPT

## 2023-06-19 PROCEDURE — 3046F HEMOGLOBIN A1C LEVEL >9.0%: CPT | Performed by: NURSE PRACTITIONER

## 2023-06-19 PROCEDURE — 1036F TOBACCO NON-USER: CPT | Performed by: NURSE PRACTITIONER

## 2023-06-19 PROCEDURE — 3075F SYST BP GE 130 - 139MM HG: CPT | Performed by: NURSE PRACTITIONER

## 2023-06-19 PROCEDURE — 2022F DILAT RTA XM EVC RTNOPTHY: CPT | Performed by: NURSE PRACTITIONER

## 2023-06-19 PROCEDURE — 83036 HEMOGLOBIN GLYCOSYLATED A1C: CPT | Performed by: NURSE PRACTITIONER

## 2023-06-19 RX ORDER — ONDANSETRON 4 MG/1
4 TABLET, ORALLY DISINTEGRATING ORAL EVERY 12 HOURS PRN
Qty: 60 TABLET | Refills: 0 | Status: SHIPPED | OUTPATIENT
Start: 2023-06-19

## 2023-06-19 RX ORDER — LISINOPRIL 20 MG/1
20 TABLET ORAL DAILY
Qty: 30 TABLET | Refills: 6 | Status: SHIPPED | OUTPATIENT
Start: 2023-06-19

## 2023-06-19 RX ORDER — CLONIDINE HYDROCHLORIDE 0.1 MG/1
0.1 TABLET ORAL 2 TIMES DAILY
Qty: 60 TABLET | Refills: 8 | Status: SHIPPED | OUTPATIENT
Start: 2023-06-19

## 2023-06-19 RX ORDER — OMEPRAZOLE 40 MG/1
40 CAPSULE, DELAYED RELEASE ORAL DAILY
Qty: 30 CAPSULE | Refills: 6 | Status: SHIPPED | OUTPATIENT
Start: 2023-06-19

## 2023-06-19 RX ORDER — ATORVASTATIN CALCIUM 10 MG/1
10 TABLET, FILM COATED ORAL NIGHTLY
Qty: 30 TABLET | Refills: 6 | Status: SHIPPED | OUTPATIENT
Start: 2023-06-19

## 2023-06-19 RX ORDER — FLUTICASONE PROPIONATE 50 MCG
2 SPRAY, SUSPENSION (ML) NASAL DAILY
Qty: 16 G | Refills: 5 | Status: SHIPPED | OUTPATIENT
Start: 2023-06-19

## 2023-06-19 RX ORDER — GABAPENTIN 300 MG/1
600 CAPSULE ORAL 3 TIMES DAILY
Qty: 180 CAPSULE | Refills: 5 | Status: SHIPPED | OUTPATIENT
Start: 2023-06-19 | End: 2023-12-16

## 2023-06-19 ASSESSMENT — PATIENT HEALTH QUESTIONNAIRE - PHQ9
SUM OF ALL RESPONSES TO PHQ QUESTIONS 1-9: 4
SUM OF ALL RESPONSES TO PHQ QUESTIONS 1-9: 4
9. THOUGHTS THAT YOU WOULD BE BETTER OFF DEAD, OR OF HURTING YOURSELF: 0
SUM OF ALL RESPONSES TO PHQ9 QUESTIONS 1 & 2: 2
4. FEELING TIRED OR HAVING LITTLE ENERGY: 0
SUM OF ALL RESPONSES TO PHQ QUESTIONS 1-9: 4
6. FEELING BAD ABOUT YOURSELF - OR THAT YOU ARE A FAILURE OR HAVE LET YOURSELF OR YOUR FAMILY DOWN: 0
1. LITTLE INTEREST OR PLEASURE IN DOING THINGS: 0
5. POOR APPETITE OR OVEREATING: 2
2. FEELING DOWN, DEPRESSED OR HOPELESS: 2
SUM OF ALL RESPONSES TO PHQ QUESTIONS 1-9: 4
3. TROUBLE FALLING OR STAYING ASLEEP: 0
8. MOVING OR SPEAKING SO SLOWLY THAT OTHER PEOPLE COULD HAVE NOTICED. OR THE OPPOSITE, BEING SO FIGETY OR RESTLESS THAT YOU HAVE BEEN MOVING AROUND A LOT MORE THAN USUAL: 0
7. TROUBLE CONCENTRATING ON THINGS, SUCH AS READING THE NEWSPAPER OR WATCHING TELEVISION: 0
10. IF YOU CHECKED OFF ANY PROBLEMS, HOW DIFFICULT HAVE THESE PROBLEMS MADE IT FOR YOU TO DO YOUR WORK, TAKE CARE OF THINGS AT HOME, OR GET ALONG WITH OTHER PEOPLE: 1

## 2023-06-19 ASSESSMENT — ENCOUNTER SYMPTOMS
VOMITING: 0
CONSTIPATION: 0
NAUSEA: 1
ABDOMINAL PAIN: 1
DIARRHEA: 0
BACK PAIN: 1
RESPIRATORY NEGATIVE: 1
BLOOD IN STOOL: 0

## 2023-06-19 NOTE — PROGRESS NOTES
Ellen Prasad is a 50 y.o. female who presents today for the following:  Chief Complaint   Patient presents with    Follow-up     4 month fu    Diabetes       No Known Allergies    Current Outpatient Medications   Medication Sig Dispense Refill    atorvastatin (LIPITOR) 10 MG tablet Take 1 tablet by mouth nightly 30 tablet 6    fluticasone (FLONASE) 50 MCG/ACT nasal spray 2 sprays by Each Nostril route daily 16 g 5    gabapentin (NEURONTIN) 300 MG capsule Take 2 capsules by mouth 3 times daily for 180 days. 180 capsule 5    lisinopril (PRINIVIL;ZESTRIL) 20 MG tablet Take 1 tablet by mouth daily 30 tablet 6    omeprazole (PRILOSEC) 40 MG delayed release capsule Take 1 capsule by mouth daily 30 capsule 6    ondansetron (ZOFRAN-ODT) 4 MG disintegrating tablet Take 1 tablet by mouth every 12 hours as needed for Nausea or Vomiting 60 tablet 0    cloNIDine (CATAPRES) 0.1 MG tablet Take 1 tablet by mouth 2 times daily 60 tablet 8    Onabotulinumtoxin A (BOTOX) 200 units injection Inject 200 Units into the muscle every 3 months 1 each 3    zolpidem (AMBIEN) 10 MG tablet Take 1 tablet by mouth nightly as needed for Sleep for up to 30 days. 30 tablet 2    propranolol (INDERAL LA) 60 MG extended release capsule Take 1 capsule by mouth daily 90 capsule 3    Atogepant (QULIPTA) 60 MG TABS Take one a day 30 tablet 11    Handicap Placard MISC by Does not apply route M54.9, G89.29, M48.062 1 each 0    tamsulosin (FLOMAX) 0.4 MG capsule Take 1 capsule by mouth in the morning and at bedtime 180 capsule 3    topiramate (TOPAMAX) 25 MG tablet Take 75 mg by mouth 2 times daily (with meals) 180 tablet 5    cyanocobalamin 100 MCG tablet Take 1 tablet by mouth daily      naloxone 4 MG/0.1ML LIQD nasal spray Use 1 spray intranasally, then discard. Repeat with new spray every 2 min as needed for opioid overdose symptoms, alternating nostrils.       divalproex (DEPAKOTE ER) 250 MG extended release tablet Take 1 tablet by mouth

## 2023-06-19 NOTE — PATIENT INSTRUCTIONS
Version: 11.6  © 9452-2018 [employer]. Care instructions adapted under license by [facility] ([facility]). If you have questions about a medical condition or this instruction, always ask your healthcare professional. [address] any warranty or liability for your use of this information.

## 2023-06-19 NOTE — PROGRESS NOTES
[facility], [address] [place] [address], [address] [place] [address]  Phone: [phone] Fax [phone]  DIDIER Ramírez          Patient: Shawn Crew  Provider: DIDIER Ramírez       Procedure note: Botulinum Toxin injections     Indication: Chronic Migraine        Subjective:      Patient present for chemodenervation for chronic migraine. The patient received her last Botox injections in March of 2023 with Dr. Anjel Husain. The patient was referred by Dr. Anjel Husain, but will continue to follow Dr. Ron Mejia for medication management. These notes have been reviewed. The patient reports no more than several headache days since last treatment. The treatments have significantly reduced the frequency and severity of headaches; however, she tends to wear off 1-2 weeks prior to next injection cycle. Her headaches are described as diffuse and bitemporal head pain. Symptoms reported to be associated with headaches include photophobia, phonophobia, nausea, and vomiting. The patient has previously tolerated the injections without difficulty or adverse effects. She continues to take Qulipta 60 mg for additional preventive therapy. Past medical history, surgical history, social history, family history, medications and allergies were all reviewed and updated as appropriate. Outpatient Encounter Medications as of 6/19/2023   Medication Sig Dispense Refill    atorvastatin (LIPITOR) 10 MG tablet Take 1 tablet by mouth nightly 30 tablet 6    fluticasone (FLONASE) 50 MCG/ACT nasal spray 2 sprays by Each Nostril route daily 16 g 5    gabapentin (NEURONTIN) 300 MG capsule Take 2 capsules by mouth 3 times daily for 180 days.  180 capsule 5    lisinopril (PRINIVIL;ZESTRIL) 20 MG tablet Take 1 tablet by mouth daily 30 tablet 6    omeprazole (PRILOSEC) 40 MG delayed release capsule Take 1 capsule by mouth daily 30 capsule 6    ondansetron (ZOFRAN-ODT) 4 MG disintegrating tablet Take 1

## 2023-06-23 NOTE — RESULT ENCOUNTER NOTE
Her platelets have improved. Her MCV or red blood cells remain large, but she has no anemia. We will continue to monitor this. Please confirm have any alcohol beverages she drinks daily and weekly. Cholesterol stable. Avoid fried, fast, or processed foods and foods high in saturated fat/cholesterol. Consume dietary fiber 20-30 grams daily. Exercise 150 minutes/week. Her potassium is elevated at 5.5. She should avoid taking potassium supplement if she is taking. Glucose is elevated at 138. And her creatinine is slightly up at 1.10. Avoid concentrated sweets, added sugars, high carbohydrate foods, processed foods, and fast foods. She should avoid NSAIDs and sodas. Would like to recheck her potassium in about 2 weeks. Please schedule lab visit. Please let me know if patient has any further questions or concerns.

## 2023-07-03 DIAGNOSIS — E87.5 HYPERKALEMIA: ICD-10-CM

## 2023-07-03 LAB
MAGNESIUM SERPL-MCNC: 1.8 MG/DL (ref 1.8–2.4)
POTASSIUM SERPL-SCNC: 4.3 MMOL/L (ref 3.5–5.1)

## 2023-07-27 ENCOUNTER — OFFICE VISIT (OUTPATIENT)
Dept: NEUROLOGY | Age: 48
End: 2023-07-27
Payer: MEDICARE

## 2023-07-27 VITALS
DIASTOLIC BLOOD PRESSURE: 87 MMHG | OXYGEN SATURATION: 98 % | HEIGHT: 66 IN | BODY MASS INDEX: 28.9 KG/M2 | HEART RATE: 82 BPM | WEIGHT: 179.8 LBS | SYSTOLIC BLOOD PRESSURE: 123 MMHG

## 2023-07-27 DIAGNOSIS — G43.709 CHRONIC MIGRAINE WITHOUT AURA WITHOUT STATUS MIGRAINOSUS, NOT INTRACTABLE: Primary | ICD-10-CM

## 2023-07-27 PROCEDURE — G8419 CALC BMI OUT NRM PARAM NOF/U: HCPCS | Performed by: PSYCHIATRY & NEUROLOGY

## 2023-07-27 PROCEDURE — 3079F DIAST BP 80-89 MM HG: CPT | Performed by: PSYCHIATRY & NEUROLOGY

## 2023-07-27 PROCEDURE — 3074F SYST BP LT 130 MM HG: CPT | Performed by: PSYCHIATRY & NEUROLOGY

## 2023-07-27 PROCEDURE — 1036F TOBACCO NON-USER: CPT | Performed by: PSYCHIATRY & NEUROLOGY

## 2023-07-27 PROCEDURE — G8427 DOCREV CUR MEDS BY ELIG CLIN: HCPCS | Performed by: PSYCHIATRY & NEUROLOGY

## 2023-07-27 PROCEDURE — 99214 OFFICE O/P EST MOD 30 MIN: CPT | Performed by: PSYCHIATRY & NEUROLOGY

## 2023-07-27 RX ORDER — UBROGEPANT 100 MG/1
TABLET ORAL
Qty: 12 TABLET | Refills: 11 | Status: SHIPPED | OUTPATIENT
Start: 2023-07-27

## 2023-07-27 ASSESSMENT — ENCOUNTER SYMPTOMS
ALLERGIC/IMMUNOLOGIC NEGATIVE: 1
GASTROINTESTINAL NEGATIVE: 1
EYES NEGATIVE: 1
RESPIRATORY NEGATIVE: 1

## 2023-07-27 NOTE — PROGRESS NOTES
[facility], [address] [facility], [address]  Phone: [phone] Fax [phone]  Dr. Martina Ortiz      7/27/2023  [address] [place] [address]     Patient is referred by the following provider for consultation regarding as below:       I reviewed the available records and notes and have examined patient with the following findings:     Chief Complaint:  Chief Complaint   Patient presents with    Follow-up     Medication questions          HPI: This is a right handed 50 y.o.  female unfortunately the patient does not bring her medication and is somewhat confused about what she takes and does not take but she does know she is getting Botox has been getting it for a year along with Botox she has been on Depakote 1250 mg at bedtime for mood which also cross covers for headaches gabapentin for nerve pain which also can cause cover for headaches Inderal long-acting for blood pressure which also can [place] cross cover for headaches. She does not think she is on the [place] any longer. She is getting about 2 headaches per week. This is the best she has ever had we have do have her off of her Topamax. It was causing cognitive side effects. Her headaches are associated with throbbing pounding sharp shooting dull aching severe nausea but no vomiting photophobia or phonophobia. She was getting them daily but no longer she getting them daily now only twice a week. She has a thoracic aneurysm which makes it difficult to use any of the triptans. In the past we have tried over-the-counter medicines Elavil Ajovy over-the-counter gabapentin Depakote Topamax Qulipta beta-blockers Klonopin Catapres Zofran gabapentin. She does have a psychiatrist treating the mood. IMAGING REVIEW:  I REVIEWED PERTINENT  IMAGES AND REPORTS WITH THE PATIENT PERSONALLY, DIRECTLY AND FULLY.      Past Medical History:  Past Medical History:   Diagnosis Date    Aneurysm ([address])     Aorta aneurysm measured

## 2023-07-28 ENCOUNTER — TELEMEDICINE (OUTPATIENT)
Dept: BEHAVIORAL/MENTAL HEALTH CLINIC | Age: 48
End: 2023-07-28
Payer: MEDICARE

## 2023-07-28 DIAGNOSIS — F51.04 INSOMNIA, PSYCHOPHYSIOLOGICAL: ICD-10-CM

## 2023-07-28 DIAGNOSIS — F31.75 BIPOLAR 1 DISORDER, DEPRESSED, PARTIAL REMISSION (HCC): Primary | ICD-10-CM

## 2023-07-28 DIAGNOSIS — F41.1 GAD (GENERALIZED ANXIETY DISORDER): ICD-10-CM

## 2023-07-28 PROCEDURE — G8427 DOCREV CUR MEDS BY ELIG CLIN: HCPCS | Performed by: NURSE PRACTITIONER

## 2023-07-28 PROCEDURE — 99215 OFFICE O/P EST HI 40 MIN: CPT | Performed by: NURSE PRACTITIONER

## 2023-07-28 RX ORDER — DIVALPROEX SODIUM 500 MG/1
1000 TABLET, EXTENDED RELEASE ORAL NIGHTLY
Qty: 60 TABLET | Refills: 3 | Status: SHIPPED | OUTPATIENT
Start: 2023-07-28 | End: 2023-08-27

## 2023-07-28 RX ORDER — DIVALPROEX SODIUM 250 MG/1
250 TABLET, EXTENDED RELEASE ORAL NIGHTLY
Qty: 30 TABLET | Refills: 3 | Status: SHIPPED | OUTPATIENT
Start: 2023-07-28 | End: 2023-08-27

## 2023-07-28 RX ORDER — ZOLPIDEM TARTRATE 10 MG/1
10 TABLET ORAL NIGHTLY PRN
Qty: 30 TABLET | Refills: 2 | Status: SHIPPED | OUTPATIENT
Start: 2023-08-18 | End: 2023-09-17

## 2023-07-28 RX ORDER — CLONAZEPAM 1 MG/1
1 TABLET ORAL 2 TIMES DAILY PRN
Qty: 60 TABLET | Refills: 2 | Status: SHIPPED | OUTPATIENT
Start: 2023-08-18 | End: 2023-09-17
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
## 2023-08-22 ENCOUNTER — TELEPHONE (OUTPATIENT)
Dept: UROLOGY | Age: 48
End: 2023-08-22

## 2023-08-22 ENCOUNTER — OFFICE VISIT (OUTPATIENT)
Dept: UROLOGY | Age: 48
End: 2023-08-22
Payer: MEDICARE

## 2023-08-22 DIAGNOSIS — R33.9 RETENTION OF URINE, UNSPECIFIED: Primary | ICD-10-CM

## 2023-08-22 DIAGNOSIS — R39.14 FEELING OF INCOMPLETE BLADDER EMPTYING: ICD-10-CM

## 2023-08-22 DIAGNOSIS — N39.0 URINARY TRACT INFECTION WITHOUT HEMATURIA, SITE UNSPECIFIED: ICD-10-CM

## 2023-08-22 LAB
BILIRUBIN, URINE, POC: NEGATIVE
BLOOD URINE, POC: NEGATIVE
GLUCOSE URINE, POC: NEGATIVE
KETONES, URINE, POC: 15
LEUKOCYTE ESTERASE, URINE, POC: NEGATIVE
NITRITE, URINE, POC: NEGATIVE
PH, URINE, POC: 6 (ref 4.6–8)
PROTEIN,URINE, POC: 30
SPECIFIC GRAVITY, URINE, POC: 1.02 (ref 1–1.03)
URINALYSIS CLARITY, POC: NORMAL
URINALYSIS COLOR, POC: NORMAL
UROBILINOGEN, POC: NORMAL

## 2023-08-22 PROCEDURE — 1036F TOBACCO NON-USER: CPT | Performed by: NURSE PRACTITIONER

## 2023-08-22 PROCEDURE — 99214 OFFICE O/P EST MOD 30 MIN: CPT | Performed by: NURSE PRACTITIONER

## 2023-08-22 PROCEDURE — G8428 CUR MEDS NOT DOCUMENT: HCPCS | Performed by: NURSE PRACTITIONER

## 2023-08-22 PROCEDURE — G8419 CALC BMI OUT NRM PARAM NOF/U: HCPCS | Performed by: NURSE PRACTITIONER

## 2023-08-22 PROCEDURE — 81003 URINALYSIS AUTO W/O SCOPE: CPT | Performed by: NURSE PRACTITIONER

## 2023-08-22 ASSESSMENT — ENCOUNTER SYMPTOMS
NAUSEA: 0
BACK PAIN: 0

## 2023-08-22 NOTE — TELEPHONE ENCOUNTER
Please schedule pt for cystoscopy with urethral dilatation. Arrange with Dr. Soumya Murphy please next available time. Thanks,   Narc[facility]    Orders are done.

## 2023-08-22 NOTE — TELEPHONE ENCOUNTER
Procedures: Procedure(s):   CYSTOSCOPY URETHRAL DILATATION   Date: 9/7/2023   Time: 1357   Location: [facility] MAIN OR 01 CYSTO     Scheduled.

## 2023-08-22 NOTE — PROGRESS NOTES
Standing Expiration Date:   10/21/2023    AMB POC URINALYSIS DIP STICK AUTO W/O MICRO     CYSTOSCOPY, with urethral dilatation.- INFORMED CONSENT: The nature of the cystoscopy and its purpose were discussed with the patient. Alternative testing (or no testing) was reviewed. Risks include, but are not limited to: bleeding, infection, perforation or tear (urethra, bladder, or ureter), difficulty voiding and urinary retention requiring catheterization and side-effects from anesthesia. The benefits are judged to outweigh the risks and no guarantees of success or outcome were given or implied. No further questions. Return for surgery scheduler will call. Total time for today's encounter including chart review, result   review, documentation and face to face encounter was 35 minutes. Reford Charu, [address] [place] [address] Urology   [address] [place] [address]  Phone: [phone]  Fax: [phone]     Dr. Dorian Pollack was supervising physician today and approves plan of care. Elements of this note have been dictated using speech recognition software. Although reviewed, errors of speech recognition may have occurred.

## 2023-09-01 NOTE — PROGRESS NOTES
Patient verified name and . Order for consent NOT found in EHR; patient verifies procedure. Type 1B surgery, phone assessment complete. Orders received. Labs per surgeon: CBC and UA (pt coming 23, chart flagged for CN to f/u  Labs per anesthesia protocol: NONE    Patient answered medical/surgical history questions at their best of ability. All prior to admission medications documented in EPIC. Patient instructed to take the following medications the day of surgery according to anesthesia guidelines with a small sip of water: Clonazepam if needed, Gabapentin, Clonidine, Propranolol, Tamsulosin, Omeprazole. On the day before surgery please take Tylenol (Acetaminophen) 1000mg in the morning and then again before bed. You may substitute for Tylenol 650 mg. Hold all vitamins 7 days prior to surgery and NSAIDS 5 days prior to surgery. Prescription meds to hold: NONE    Patient instructed on the following:    > Arrive at [facility] MAIN Entrance, time of arrival to be called the day before by 1700  > NPO after midnight, unless otherwise indicated, including gum, mints, and ice chips  > Responsible adult must drive patient to the hospital, stay during surgery, and patient will need supervision 24 hours after anesthesia  > Use antibacterial soap in shower the night before surgery and on the morning of surgery  > All piercings must be removed prior to arrival.    > Leave all valuables (money and jewelry) at home but bring insurance card and ID on DOS.   > You may be required to pay a deductible or co-pay on the day of your procedure. You can pre-pay by calling [phone] if your surgery is at the [facility] or [phone] if your surgery is at the [facility]. > Do not wear make-up, nail polish, lotions, cologne, perfumes, powders, or oil on skin. Artificial nails are not permitted.

## 2023-09-05 ENCOUNTER — HOSPITAL ENCOUNTER (OUTPATIENT)
Dept: LAB | Age: 48
Discharge: HOME | End: 2023-09-08
Payer: MEDICARE

## 2023-09-05 DIAGNOSIS — R33.9 RETENTION OF URINE, UNSPECIFIED: ICD-10-CM

## 2023-09-05 DIAGNOSIS — Z01.818 PRE-OP TESTING: ICD-10-CM

## 2023-09-05 DIAGNOSIS — N39.0 URINARY TRACT INFECTION WITHOUT HEMATURIA, SITE UNSPECIFIED: ICD-10-CM

## 2023-09-05 LAB
APPEARANCE UR: CLEAR
BASOPHILS # BLD: 0 K/UL (ref 0–0.2)
BASOPHILS NFR BLD: 1 % (ref 0–2)
BILIRUB UR QL: NEGATIVE
COLOR UR: ABNORMAL
DIFFERENTIAL METHOD BLD: ABNORMAL
EOSINOPHIL # BLD: 0.1 K/UL (ref 0–0.8)
EOSINOPHIL NFR BLD: 3 % (ref 0.5–7.8)
ERYTHROCYTE [DISTWIDTH] IN BLOOD BY AUTOMATED COUNT: 12.6 % (ref 11.9–14.6)
GLUCOSE UR STRIP.AUTO-MCNC: NEGATIVE MG/DL
HCT VFR BLD AUTO: 42.1 % (ref 35.8–46.3)
HGB BLD-MCNC: 14.3 G/DL (ref 11.7–15.4)
HGB UR QL STRIP: NEGATIVE
IMM GRANULOCYTES # BLD AUTO: 0 K/UL (ref 0–0.5)
IMM GRANULOCYTES NFR BLD AUTO: 0 % (ref 0–5)
KETONES UR QL STRIP.AUTO: ABNORMAL MG/DL
LEUKOCYTE ESTERASE UR QL STRIP.AUTO: NEGATIVE
LYMPHOCYTES # BLD: 1.7 K/UL (ref 0.5–4.6)
LYMPHOCYTES NFR BLD: 36 % (ref 13–44)
MCH RBC QN AUTO: 35 PG (ref 26.1–32.9)
MCHC RBC AUTO-ENTMCNC: 34 G/DL (ref 31.4–35)
MCV RBC AUTO: 103.2 FL (ref 82–102)
MONOCYTES # BLD: 0.2 K/UL (ref 0.1–1.3)
MONOCYTES NFR BLD: 5 % (ref 4–12)
NEUTS SEG # BLD: 2.8 K/UL (ref 1.7–8.2)
NEUTS SEG NFR BLD: 55 % (ref 43–78)
NITRITE UR QL STRIP.AUTO: NEGATIVE
NRBC # BLD: 0 K/UL (ref 0–0.2)
PH UR STRIP: 6.5 (ref 5–9)
PLATELET # BLD AUTO: 49 K/UL (ref 150–450)
PLATELET COMMENT: SLIGHT
PMV BLD AUTO: ABNORMAL FL (ref 9.4–12.3)
PROT UR STRIP-MCNC: NEGATIVE MG/DL
RBC # BLD AUTO: 4.08 M/UL (ref 4.05–5.2)
SP GR UR REFRACTOMETRY: 1.02 (ref 1–1.02)
UROBILINOGEN UR QL STRIP.AUTO: 0.2 EU/DL (ref 0.2–1)
WBC # BLD AUTO: 4.8 K/UL (ref 4.3–11.1)

## 2023-09-05 PROCEDURE — 36415 COLL VENOUS BLD VENIPUNCTURE: CPT

## 2023-09-05 PROCEDURE — 81003 URINALYSIS AUTO W/O SCOPE: CPT

## 2023-09-05 PROCEDURE — 85025 COMPLETE CBC W/AUTO DIFF WBC: CPT

## 2023-09-06 ENCOUNTER — ANESTHESIA EVENT (OUTPATIENT)
Dept: SURGERY | Age: 48
End: 2023-09-06
Payer: MEDICARE

## 2023-09-06 NOTE — PROGRESS NOTES
Dr. Pia Jones reviewed chart and states \"notify surgery, ok with me, repeat CBC and platelet count on arrival.\" Surgeon and office notified.

## 2023-09-06 NOTE — PRE-PROCEDURE INSTRUCTIONS
Patient called surgical preassessment to inform a drug reaction she has related to her ADHD. \"Demerol wakes me slam up because of my ADHD, I react differently. \" Patient also asking for time of arrival tomorrow.  She was informed that the surgical nurses would call this afternoon and given phone number if they haven't called by 4 pm.

## 2023-09-07 ENCOUNTER — ANESTHESIA (OUTPATIENT)
Dept: SURGERY | Age: 48
End: 2023-09-07
Payer: MEDICARE

## 2023-09-07 ENCOUNTER — APPOINTMENT (OUTPATIENT)
Dept: GENERAL RADIOLOGY | Age: 48
End: 2023-09-07
Attending: UROLOGY
Payer: MEDICARE

## 2023-09-07 ENCOUNTER — HOSPITAL ENCOUNTER (OUTPATIENT)
Age: 48
Setting detail: OUTPATIENT SURGERY
Discharge: HOME | End: 2023-09-07
Attending: UROLOGY | Admitting: UROLOGY
Payer: MEDICARE

## 2023-09-07 VITALS
HEART RATE: 66 BPM | SYSTOLIC BLOOD PRESSURE: 140 MMHG | HEIGHT: 66 IN | TEMPERATURE: 97.4 F | OXYGEN SATURATION: 95 % | BODY MASS INDEX: 28.28 KG/M2 | DIASTOLIC BLOOD PRESSURE: 66 MMHG | RESPIRATION RATE: 14 BRPM | WEIGHT: 176 LBS

## 2023-09-07 DIAGNOSIS — R33.9 RETENTION OF URINE, UNSPECIFIED: ICD-10-CM

## 2023-09-07 DIAGNOSIS — Z01.818 PRE-OP TESTING: Primary | ICD-10-CM

## 2023-09-07 DIAGNOSIS — N39.0 URINARY TRACT INFECTION WITHOUT HEMATURIA, SITE UNSPECIFIED: ICD-10-CM

## 2023-09-07 LAB
ERYTHROCYTE [DISTWIDTH] IN BLOOD BY AUTOMATED COUNT: 12.5 % (ref 11.9–14.6)
HCT VFR BLD AUTO: 40.5 % (ref 35.8–46.3)
HGB BLD-MCNC: 14 G/DL (ref 11.7–15.4)
MCH RBC QN AUTO: 35.5 PG (ref 26.1–32.9)
MCHC RBC AUTO-ENTMCNC: 34.6 G/DL (ref 31.4–35)
MCV RBC AUTO: 102.8 FL (ref 82–102)
NRBC # BLD: 0 K/UL (ref 0–0.2)
PLATELET # BLD AUTO: 95 K/UL (ref 150–450)
PMV BLD AUTO: 13.3 FL (ref 9.4–12.3)
RBC # BLD AUTO: 3.94 M/UL (ref 4.05–5.2)
WBC # BLD AUTO: 5.3 K/UL (ref 4.3–11.1)

## 2023-09-07 PROCEDURE — 85027 COMPLETE CBC AUTOMATED: CPT

## 2023-09-07 PROCEDURE — 3600000004 HC SURGERY LEVEL 4 BASE: Performed by: UROLOGY

## 2023-09-07 PROCEDURE — 6360000002 HC RX W HCPCS: Performed by: ANESTHESIOLOGY

## 2023-09-07 PROCEDURE — 7100000000 HC PACU RECOVERY - FIRST 15 MIN: Performed by: UROLOGY

## 2023-09-07 PROCEDURE — 2709999900 HC NON-CHARGEABLE SUPPLY: Performed by: UROLOGY

## 2023-09-07 PROCEDURE — 6360000002 HC RX W HCPCS: Performed by: NURSE ANESTHETIST, CERTIFIED REGISTERED

## 2023-09-07 PROCEDURE — 6370000000 HC RX 637 (ALT 250 FOR IP): Performed by: ANESTHESIOLOGY

## 2023-09-07 PROCEDURE — 6360000002 HC RX W HCPCS: Performed by: UROLOGY

## 2023-09-07 PROCEDURE — 7100000010 HC PHASE II RECOVERY - FIRST 15 MIN: Performed by: UROLOGY

## 2023-09-07 PROCEDURE — 3600000014 HC SURGERY LEVEL 4 ADDTL 15MIN: Performed by: UROLOGY

## 2023-09-07 PROCEDURE — 3700000001 HC ADD 15 MINUTES (ANESTHESIA): Performed by: UROLOGY

## 2023-09-07 PROCEDURE — 52281 CYSTOSCOPY AND TREATMENT: CPT | Performed by: UROLOGY

## 2023-09-07 PROCEDURE — 2500000003 HC RX 250 WO HCPCS: Performed by: NURSE ANESTHETIST, CERTIFIED REGISTERED

## 2023-09-07 PROCEDURE — 3700000000 HC ANESTHESIA ATTENDED CARE: Performed by: UROLOGY

## 2023-09-07 PROCEDURE — 7100000011 HC PHASE II RECOVERY - ADDTL 15 MIN: Performed by: UROLOGY

## 2023-09-07 PROCEDURE — 2580000003 HC RX 258: Performed by: ANESTHESIOLOGY

## 2023-09-07 PROCEDURE — 7100000001 HC PACU RECOVERY - ADDTL 15 MIN: Performed by: UROLOGY

## 2023-09-07 RX ORDER — FENTANYL CITRATE 50 UG/ML
INJECTION, SOLUTION INTRAMUSCULAR; INTRAVENOUS PRN
Status: DISCONTINUED | OUTPATIENT
Start: 2023-09-07 | End: 2023-09-07 | Stop reason: SDUPTHER

## 2023-09-07 RX ORDER — ONDANSETRON 2 MG/ML
4 INJECTION INTRAMUSCULAR; INTRAVENOUS ONCE
Status: COMPLETED | OUTPATIENT
Start: 2023-09-07 | End: 2023-09-07

## 2023-09-07 RX ORDER — SODIUM CHLORIDE, SODIUM LACTATE, POTASSIUM CHLORIDE, CALCIUM CHLORIDE 600; 310; 30; 20 MG/100ML; MG/100ML; MG/100ML; MG/100ML
INJECTION, SOLUTION INTRAVENOUS CONTINUOUS
Status: DISCONTINUED | OUTPATIENT
Start: 2023-09-07 | End: 2023-09-07 | Stop reason: HOSPADM

## 2023-09-07 RX ORDER — ONDANSETRON 2 MG/ML
INJECTION INTRAMUSCULAR; INTRAVENOUS PRN
Status: DISCONTINUED | OUTPATIENT
Start: 2023-09-07 | End: 2023-09-07 | Stop reason: SDUPTHER

## 2023-09-07 RX ORDER — LIDOCAINE HYDROCHLORIDE 20 MG/ML
INJECTION, SOLUTION EPIDURAL; INFILTRATION; INTRACAUDAL; PERINEURAL PRN
Status: DISCONTINUED | OUTPATIENT
Start: 2023-09-07 | End: 2023-09-07 | Stop reason: SDUPTHER

## 2023-09-07 RX ORDER — LABETALOL HYDROCHLORIDE 5 MG/ML
10 INJECTION, SOLUTION INTRAVENOUS
Status: DISCONTINUED | OUTPATIENT
Start: 2023-09-07 | End: 2023-09-07 | Stop reason: HOSPADM

## 2023-09-07 RX ORDER — SODIUM CHLORIDE 9 MG/ML
INJECTION, SOLUTION INTRAVENOUS PRN
Status: DISCONTINUED | OUTPATIENT
Start: 2023-09-07 | End: 2023-09-07 | Stop reason: HOSPADM

## 2023-09-07 RX ORDER — PROPOFOL 10 MG/ML
INJECTION, EMULSION INTRAVENOUS PRN
Status: DISCONTINUED | OUTPATIENT
Start: 2023-09-07 | End: 2023-09-07 | Stop reason: SDUPTHER

## 2023-09-07 RX ORDER — ONDANSETRON 2 MG/ML
4 INJECTION INTRAMUSCULAR; INTRAVENOUS
Status: DISCONTINUED | OUTPATIENT
Start: 2023-09-07 | End: 2023-09-07 | Stop reason: HOSPADM

## 2023-09-07 RX ORDER — ACETAMINOPHEN 500 MG
1000 TABLET ORAL ONCE
Status: DISCONTINUED | OUTPATIENT
Start: 2023-09-07 | End: 2023-09-07 | Stop reason: HOSPADM

## 2023-09-07 RX ORDER — LIDOCAINE HYDROCHLORIDE 10 MG/ML
1 INJECTION, SOLUTION INFILTRATION; PERINEURAL
Status: DISCONTINUED | OUTPATIENT
Start: 2023-09-07 | End: 2023-09-07 | Stop reason: HOSPADM

## 2023-09-07 RX ORDER — FENTANYL CITRATE 50 UG/ML
100 INJECTION, SOLUTION INTRAMUSCULAR; INTRAVENOUS
Status: DISCONTINUED | OUTPATIENT
Start: 2023-09-07 | End: 2023-09-07 | Stop reason: HOSPADM

## 2023-09-07 RX ORDER — DEXAMETHASONE SODIUM PHOSPHATE 4 MG/ML
INJECTION, SOLUTION INTRA-ARTICULAR; INTRALESIONAL; INTRAMUSCULAR; INTRAVENOUS; SOFT TISSUE PRN
Status: DISCONTINUED | OUTPATIENT
Start: 2023-09-07 | End: 2023-09-07 | Stop reason: SDUPTHER

## 2023-09-07 RX ORDER — HYDROMORPHONE HYDROCHLORIDE 2 MG/ML
0.5 INJECTION, SOLUTION INTRAMUSCULAR; INTRAVENOUS; SUBCUTANEOUS EVERY 5 MIN PRN
Status: DISCONTINUED | OUTPATIENT
Start: 2023-09-07 | End: 2023-09-07 | Stop reason: HOSPADM

## 2023-09-07 RX ORDER — SODIUM CHLORIDE 0.9 % (FLUSH) 0.9 %
5-40 SYRINGE (ML) INJECTION PRN
Status: DISCONTINUED | OUTPATIENT
Start: 2023-09-07 | End: 2023-09-07 | Stop reason: HOSPADM

## 2023-09-07 RX ORDER — HYDROMORPHONE HYDROCHLORIDE 2 MG/ML
0.25 INJECTION, SOLUTION INTRAMUSCULAR; INTRAVENOUS; SUBCUTANEOUS EVERY 5 MIN PRN
Status: DISCONTINUED | OUTPATIENT
Start: 2023-09-07 | End: 2023-09-07 | Stop reason: HOSPADM

## 2023-09-07 RX ORDER — OXYCODONE HYDROCHLORIDE 5 MG/1
5 TABLET ORAL
Status: COMPLETED | OUTPATIENT
Start: 2023-09-07 | End: 2023-09-07

## 2023-09-07 RX ORDER — PROCHLORPERAZINE EDISYLATE 5 MG/ML
5 INJECTION INTRAMUSCULAR; INTRAVENOUS
Status: DISCONTINUED | OUTPATIENT
Start: 2023-09-07 | End: 2023-09-07 | Stop reason: HOSPADM

## 2023-09-07 RX ORDER — SODIUM CHLORIDE 0.9 % (FLUSH) 0.9 %
5-40 SYRINGE (ML) INJECTION EVERY 12 HOURS SCHEDULED
Status: DISCONTINUED | OUTPATIENT
Start: 2023-09-07 | End: 2023-09-07 | Stop reason: HOSPADM

## 2023-09-07 RX ORDER — MIDAZOLAM HYDROCHLORIDE 2 MG/2ML
2 INJECTION, SOLUTION INTRAMUSCULAR; INTRAVENOUS
Status: COMPLETED | OUTPATIENT
Start: 2023-09-07 | End: 2023-09-07

## 2023-09-07 RX ORDER — HYDRALAZINE HYDROCHLORIDE 20 MG/ML
10 INJECTION INTRAMUSCULAR; INTRAVENOUS
Status: DISCONTINUED | OUTPATIENT
Start: 2023-09-07 | End: 2023-09-07 | Stop reason: HOSPADM

## 2023-09-07 RX ADMIN — ONDANSETRON 4 MG: 2 INJECTION INTRAMUSCULAR; INTRAVENOUS at 08:01

## 2023-09-07 RX ADMIN — PROPOFOL 200 MG: 10 INJECTION, EMULSION INTRAVENOUS at 07:48

## 2023-09-07 RX ADMIN — MIDAZOLAM 2 MG: 1 INJECTION INTRAMUSCULAR; INTRAVENOUS at 07:02

## 2023-09-07 RX ADMIN — HYDROMORPHONE HYDROCHLORIDE 0.5 MG: 2 INJECTION, SOLUTION INTRAMUSCULAR; INTRAVENOUS; SUBCUTANEOUS at 08:41

## 2023-09-07 RX ADMIN — DEXAMETHASONE SODIUM PHOSPHATE 4 MG: 4 INJECTION, SOLUTION INTRAMUSCULAR; INTRAVENOUS at 08:01

## 2023-09-07 RX ADMIN — SODIUM CHLORIDE, POTASSIUM CHLORIDE, SODIUM LACTATE AND CALCIUM CHLORIDE: 600; 310; 30; 20 INJECTION, SOLUTION INTRAVENOUS at 06:35

## 2023-09-07 RX ADMIN — FENTANYL CITRATE 50 MCG: 50 INJECTION, SOLUTION INTRAMUSCULAR; INTRAVENOUS at 07:48

## 2023-09-07 RX ADMIN — OXYCODONE HYDROCHLORIDE 5 MG: 5 TABLET ORAL at 08:45

## 2023-09-07 RX ADMIN — ONDANSETRON 4 MG: 2 INJECTION INTRAMUSCULAR; INTRAVENOUS at 06:59

## 2023-09-07 RX ADMIN — Medication 2000 MG: at 07:54

## 2023-09-07 RX ADMIN — LIDOCAINE HYDROCHLORIDE 80 MG: 20 INJECTION, SOLUTION EPIDURAL; INFILTRATION; INTRACAUDAL; PERINEURAL at 07:48

## 2023-09-07 RX ADMIN — HYDROMORPHONE HYDROCHLORIDE 0.5 MG: 2 INJECTION, SOLUTION INTRAMUSCULAR; INTRAVENOUS; SUBCUTANEOUS at 08:36

## 2023-09-07 ASSESSMENT — PAIN SCALES - GENERAL
PAINLEVEL_OUTOF10: 7
PAINLEVEL_OUTOF10: 6
PAINLEVEL_OUTOF10: 8

## 2023-09-07 ASSESSMENT — PAIN DESCRIPTION - LOCATION: LOCATION: VAGINA

## 2023-09-07 ASSESSMENT — PAIN - FUNCTIONAL ASSESSMENT: PAIN_FUNCTIONAL_ASSESSMENT: 0-10

## 2023-09-07 NOTE — OP NOTE
[facility]  OPERATIVE REPORT    Name:  Christina Sandhu  MR#:  [id number]  :  1975  ACCOUNT #:  [de-identified]  DATE OF SERVICE:  2023    PREOPERATIVE DIAGNOSES:  1. Urinary retention. 2.  Urethral stricture. POSTOPERATIVE DIAGNOSES:  1. Urinary retention. 2.  Urethral stricture. PROCEDURE PERFORMED:  Cystoscopy with dilation of urethral stricture. SURGEON:  Kevyn Ruano MD    ASSISTANT:  None. ANESTHESIA:  General.    COMPLICATIONS:  None. SPECIMENS REMOVED:  None. IMPLANTS:  None. ESTIMATED BLOOD LOSS:  None. FINDINGS:  Urethra shows mild urethral caruncle and evidence of a mild urethral stricture. Normal-appearing bladder. PROCEDURE:  The patient was given general anesthetic, placed in the dorsal supine position. She was prepped and draped in sterile fashion. Examination shows that she is status post hysterectomy. There is good anterior vaginal support. The urethra does not show any masses. I was able to pass a 22-English cystoscope using video camera and 30-degree lens. The urethra does show evidence of mild caruncle right at the meatus. In addition, there is some mild narrowing of the urethra, but I was able to pass the 22-English scope into the bladder. The scope was then removed. We dilated the urethra using Carmela Halim sounds from 18-English up to 32-English. I then repassed the scope. The bladder shows normal-appearing mucosa. There is no evidence of trauma. The ureteral orifices were normal.  At this point, I placed an 18-English Mayo catheter and left indwelling with 10 mL of water in the balloon. PLAN:  She will be discharged home with Mayo. She is to return to the office in one week for Mayo removal.  I anticipate she is going to need a vaginal estrogen cream and possibly do self-cath p.r.n.       Kevyn Ruano MD      [id number]  D:  2023 8:28  T:  2023 9:16  JOB #:  [id number]

## 2023-09-07 NOTE — H&P
Saeednisha Lorenzana  : 1975         Chief Complaint   Patient presents with    Follow-up            HPI      Filipe Webster is a 50 y.o. female  Returns today after being seen at [facility] continence center. I do not have the results from Alyssa's office, however, the patient tells me that she had recommended a cystoscopy with possible urethral dilatation. Patient has continued to perform CIC at least 3 times a day. She is voiding some on her own but continues to get a large amount of urine out with catheters. Since using catheters however she is not having any significant incontinence. History as copied from Previous note. Initially she was seen as an ER follow up after an episode of urinary retention. Catheter was inserted 21 in the ER after pt had not voided for 24 hours. 900mls was drained from the bladder. Pt tells me this has happened before and she has seen the [facility] urology in the past ([facility]). She has had numerous test done. She took flomax for some time, but has not recently been on this medication. She say that she was told a surgery to her \"bladder flap\" would fix her problem. Over all she thinks she has been voiding well up until a week ago. Last visit with [facility] urology was . Note from there reports that She was last seen by urology on 14 by Dr. Abril Layne for urinary retention. Per Dr. Emmette Ormond previous note, MRI was performed to evaluate urethral diverticulum which did not show any evidence of urethral mass. Cystoscopy was normal and UDS showed an obstructive process on 9/3/14. She was started on Flomax by NP on 14 and was subsequently lost to follow up. She has not been taking her Flomax. Today the patient presents in hospital follow-up for catheter removal with voiding trial. She presented to [facility] emergency room on 17 with complaints of difficulty urinating.  Bladder scan showed >600 mL urine in the bladder and a

## 2023-09-07 NOTE — ANESTHESIA POSTPROCEDURE EVALUATION
Department of Anesthesiology  Postprocedure Note    Patient: Vikash Palmer  MRN: [id number]  YOB: 1975  Date of evaluation: 9/7/2023      Procedure Summary     Date: 09/07/23 Room / Location: [facility] MAIN OR  / [facility] MAIN OR    Anesthesia Start: 0739 Anesthesia Stop: 0826    Procedure: CYSTOSCOPY URETHRAL DILATATION (Bladder) Diagnosis:       Urinary retention      (Urinary retention [R33.9])    Providers: Rebeka Moody MD Responsible Provider: Alfie Kenny DO    Anesthesia Type: General ASA Status: 3          Anesthesia Type: General    Denis Phase I: Denis Score: 8    Denis Phase II: Denis Score: 10      Anesthesia Post Evaluation    Patient location during evaluation: PACU  Level of consciousness: awake and alert  Airway patency: patent  Nausea & Vomiting: no nausea  Complications: no  Cardiovascular status: hemodynamically stable  Respiratory status: acceptable  Hydration status: euvolemic  Pain management: satisfactory to patient

## 2023-09-07 NOTE — PERIOP NOTE
Pt and  Arvind Garcia given discharge instructions at bedside, both verbalize understanding. All questions answered.

## 2023-09-07 NOTE — PERIOP NOTE
Pt encouraged to use restroom prior to sedation medication, denies need. IV sedation and nausea medicine given see EMAR. Continuous pulse oximeter monitoring in use.

## 2023-09-07 NOTE — DISCHARGE INSTRUCTIONS
Home with clark, leg bag and bedside bag. Will call for follow up    If you have had surgery in the past 7-10 days by one of our providers and are having fever, bleeding, or drainage from an incision, have an opening in an incision, or having issues urinating properly, please call [phone]. You may have a small tube called a catheter in your urethra to help stop bleeding and to prevent blockage of the urethra. You may feel the need to urinate frequently for a while after the surgery, but this should improve with time. It may burn when you urinate. Drink lots of fluids to help with the burning. Your urine also may look pink for up to 2 to 3 weeks after surgery. This is because there may be blood in it. You may have to avoid strenuous activity and heavy lifting for about 3 weeks. This care sheet gives you a general idea about how long it will take for you to recover. But each person recovers at a different pace. Follow the steps below to feel better as quickly as possible. How can you care for yourself at home? Activity  Rest when you feel tired. Getting enough sleep will help you recover. Try to walk each day. Start by walking a little more than you did the day before. Bit by bit, increase the amount you walk. Walking boosts blood flow and helps prevent pneumonia and constipation. Please limit your activities for 3 days. Avoid strenuous activities, such as bicycle riding, jogging, weight lifting, or aerobic exercise, for about 3 weeks, or until your doctor says it is okay. For about 3 weeks, avoid lifting anything that would make you strain. This may include heavy grocery bags and milk containers, a heavy briefcase or backpack, cat litter or dog food bags, a vacuum , or a child. Ask your doctor when you can drive again. You may shower and take baths when your doctor says it is okay. Ask your doctor when it is okay for you to have sex. Diet  You can eat your normal diet.  If your stomach keep your catheter for an extended period of time, you may be given a leg bag that attaches to your thigh or calf with Velcro straps. If you are sent home with more than one bag, you will be given instructions on how to care for the bags and change them. 7.  If you are to remove your catheter at home, you will be sent home with specific instructions on how to do that.

## 2023-09-07 NOTE — BRIEF OP NOTE
Brief Postoperative Note      Patient: Sneha Grover  YOB: 1975  MRN: [id number]    Date of Procedure: 9/7/2023    Pre-Op Diagnosis Codes:     * Urinary retention [R33.9]    Post-Op Diagnosis: Same       Procedure(s):  CYSTOSCOPY URETHRAL DILATATION    Surgeon(s):  Aayush Wellington., MD    Assistant:  * No surgical staff found *    Anesthesia: General    Estimated Blood Loss (mL): Minimal    Complications: None    Specimens:   * No specimens in log *    Implants:  * No implants in log *      Drains: * No LDAs found *    Findings: see op note      Electronically signed by Aayush Wellington MD on 9/7/2023 at 8:18 AM

## 2023-09-07 NOTE — ANESTHESIA PRE PROCEDURE
Department of Anesthesiology  Preprocedure Note       Name:  Raford Mcardle   Age:  50 y.o.  :  1975                                          MRN:  [id number]         Date:  2023      Surgeon: Anne Leslie):  Cathleen Keith MD    Procedure: Procedure(s):  CYSTOSCOPY URETHRAL DILATATION    Medications prior to admission:   Prior to Admission medications    Medication Sig Start Date End Date Taking? Authorizing Provider   clonazePAM (KLONOPIN) 1 MG tablet Take 1 tablet by mouth 2 times daily as needed for Anxiety for up to 30 days. 23  Onalee So, APRN - CNP   divalproex (DEPAKOTE ER) 500 MG extended release tablet Take 2 tablets by mouth at bedtime 23  Onalee So, APRN - CNP   divalproex (DEPAKOTE ER) 250 MG extended release tablet Take 1 tablet by mouth at bedtime Take with the two 500 mg tablets to equal 1250 mg Q HS. Patient taking differently: Take 1 tablet by mouth at bedtime Take with the two 500 mg tablets to equal 1250 mg QHS. 23  Onalee So, APRN - CNP   zolpidem (AMBIEN) 10 MG tablet Take 1 tablet by mouth nightly as needed for Sleep for up to 30 days. 23  Onalee So, APRN - CNP   Ubrogepant (UBRELVY) 100 MG TABS Take 1 at the onset of your headache and repeat in 2 hours as needed headache 23   Kayla Ellis DO   atorvastatin (LIPITOR) 10 MG tablet Take 1 tablet by mouth nightly 23   Yessi Primrose, APRN - CNP   fluticasone [facility]) 50 MCG/ACT nasal spray 2 sprays by Each Nostril route daily 23   Maridee Primrose, APRN - CNP   gabapentin (NEURONTIN) 300 MG capsule Take 2 capsules by mouth 3 times daily for 180 days.  23  Rosa Mariae Primrose, APRN - CNP   lisinopril (PRINIVIL;ZESTRIL) 20 MG tablet Take 1 tablet by mouth daily 23   Maridee Primrose, APRN - CNP   omeprazole (PRILOSEC) 40 MG delayed release capsule Take 1 capsule by mouth daily 23   Maridee Primrose, APRN - JADEN   ondansetron

## 2023-09-07 NOTE — PERIOP NOTE
Pt educated about removing jewelry (lip/facial piercing) prior to surgery. Pt denies the ability to remove. Educated about the risk of burns with use of cautery. RN placed paper tape over piercing.

## 2023-09-08 ENCOUNTER — TELEPHONE (OUTPATIENT)
Dept: UROLOGY | Age: 48
End: 2023-09-08

## 2023-09-08 DIAGNOSIS — R33.9 RETENTION OF URINE, UNSPECIFIED: Primary | ICD-10-CM

## 2023-09-08 RX ORDER — HYDROCODONE BITARTRATE AND ACETAMINOPHEN 5; 325 MG/1; MG/1
1 TABLET ORAL EVERY 6 HOURS PRN
Qty: 12 TABLET | Refills: 0 | Status: SHIPPED | OUTPATIENT
Start: 2023-09-08 | End: 2023-09-11

## 2023-09-08 NOTE — TELEPHONE ENCOUNTER
Diagnosis Orders   1.  Retention of urine, unspecified  HYDROcodone-acetaminophen (NORCO) 5-325 MG per tablet        I eprescribed the med Jonathan Mcclain is a 45 y.o. female Follow up for the Evaluation of Breast Cancer. 1. Have you been to the ER, urgent care clinic since your last visit? Hospitalized since your last visit? No    2. Have you seen or consulted any other health care providers outside of the [address] [place] [address] since your last visit? Include any pap smears or colon screening.  No

## 2023-09-11 ENCOUNTER — OFFICE VISIT (OUTPATIENT)
Dept: NEUROLOGY | Age: 48
End: 2023-09-11
Payer: MEDICARE

## 2023-09-11 VITALS — HEART RATE: 79 BPM | DIASTOLIC BLOOD PRESSURE: 95 MMHG | SYSTOLIC BLOOD PRESSURE: 151 MMHG

## 2023-09-11 DIAGNOSIS — G43.709 CHRONIC MIGRAINE WITHOUT AURA WITHOUT STATUS MIGRAINOSUS, NOT INTRACTABLE: Primary | ICD-10-CM

## 2023-09-11 PROCEDURE — 64615 CHEMODENERV MUSC MIGRAINE: CPT

## 2023-09-11 RX ORDER — UBROGEPANT 100 MG/1
TABLET ORAL
Qty: 12 TABLET | Refills: 11 | Status: SHIPPED | OUTPATIENT
Start: 2023-09-11

## 2023-09-11 NOTE — PROGRESS NOTES
0921   BP: (!) 151/95   Pulse: 79            General Exam:  General - Well developed, well nourished, in no apparent distress. HENT - Normocephalic, atraumatic. Oropharynx clear. Eyes - Sclera anicteric. External Ears - Appear normal.   Neck - No visualized mass. Lungs - Breathing is non-labored. Skin - No significant lesions or discoloration noted. Musculoskeletal - Moves all extremities. No deformities noted. Psychiatric - Normal affect. No hallucinations. Neurological Exam:      MS/Language/Speech - Alert. Attentive and cooperative. Language fluent. Speech is clear. Cranial Nerves - Eye movements full. No nystagmus. No facial asymmetry. Tongue was midline. Shoulder shrug symmetric. Motor - Moves all extremities well with no apparent limitations. No tremor present. Cerebellar - No ataxia or dysmetria. Gait - Unremarkable      Assessment/Plan:      Norma Sethi is a 50 y. o.female who presents for chemodenervation for chronic migraines. - Proceed with botulinum toxin injections as noted below.     - RTC 3 months for review and re-injection.     -Will continue to follow-up with Dr. Yasmine Hemphill for medication management. Procedure:       Consent: Written consent obtained after the potential risks and benefits have been explained to the patient. Potential risks include:  Pain, bruising, bleeding, infection, flu-like symptoms, over-weakening of injected or adjacent muscles and swallowing dysfunction. Patient was given the botulinum toxin medication guide according to FDA standards. Technique: Botox A 200 unit was dissolved into non-preservative normal saline 4 ml. Gauge 30 facial needles were used for the injection. Procedure: Five units/ 0.1 ml per site unless specified. Occipitalis R3 L3. Cervical paraspinal R2 L2. Trapezius R3 L3. Temporalis R4+1 L4+1. Frontalis R2 L2. Procerus 1. Corrugators R1 L1. Total injected: 175 BOTOX 100units/2 cc.    Waste:

## 2023-09-14 ENCOUNTER — NURSE ONLY (OUTPATIENT)
Dept: UROLOGY | Age: 48
End: 2023-09-14

## 2023-09-14 DIAGNOSIS — R33.9 RETENTION OF URINE, UNSPECIFIED: Primary | ICD-10-CM

## 2023-09-14 NOTE — PROGRESS NOTES
Pt came in for catheter removal. 18f clark catheter removed without incident. Pt instructed to push fluids, 6-8 glasses of water and if she has not voided on her own by 3:00pm to call us and come in this afternoon for reinsertion.

## 2023-09-20 DIAGNOSIS — R39.14 FEELING OF INCOMPLETE BLADDER EMPTYING: ICD-10-CM

## 2023-09-20 DIAGNOSIS — R33.9 RETENTION OF URINE, UNSPECIFIED: ICD-10-CM

## 2023-09-20 NOTE — TELEPHONE ENCOUNTER
----- Message from Michael Cabello MA sent at 9/14/2023  8:55 AM EDT -----  Should patient continue flomax?

## 2023-09-22 RX ORDER — TAMSULOSIN HYDROCHLORIDE 0.4 MG/1
0.4 CAPSULE ORAL 2 TIMES DAILY
Qty: 180 CAPSULE | Refills: 3 | Status: SHIPPED | OUTPATIENT
Start: 2023-09-22

## 2023-09-25 ENCOUNTER — TELEPHONE (OUTPATIENT)
Dept: NEUROLOGY | Age: 48
End: 2023-09-25

## 2023-10-30 ENCOUNTER — TELEMEDICINE (OUTPATIENT)
Dept: BEHAVIORAL/MENTAL HEALTH CLINIC | Age: 48
End: 2023-10-30
Payer: MEDICARE

## 2023-10-30 DIAGNOSIS — F51.04 INSOMNIA, PSYCHOPHYSIOLOGICAL: ICD-10-CM

## 2023-10-30 DIAGNOSIS — F41.1 GAD (GENERALIZED ANXIETY DISORDER): ICD-10-CM

## 2023-10-30 DIAGNOSIS — F31.75 BIPOLAR 1 DISORDER, DEPRESSED, PARTIAL REMISSION (HCC): Primary | ICD-10-CM

## 2023-10-30 PROCEDURE — 99215 OFFICE O/P EST HI 40 MIN: CPT | Performed by: NURSE PRACTITIONER

## 2023-10-30 PROCEDURE — G8427 DOCREV CUR MEDS BY ELIG CLIN: HCPCS | Performed by: NURSE PRACTITIONER

## 2023-10-30 RX ORDER — DIVALPROEX SODIUM 500 MG/1
1000 TABLET, EXTENDED RELEASE ORAL NIGHTLY
Qty: 60 TABLET | Refills: 3 | Status: SHIPPED | OUTPATIENT
Start: 2023-10-30 | End: 2023-11-29

## 2023-10-30 RX ORDER — CLONAZEPAM 1 MG/1
1 TABLET ORAL 2 TIMES DAILY PRN
Qty: 60 TABLET | Refills: 2 | Status: SHIPPED | OUTPATIENT
Start: 2023-11-15 | End: 2023-12-15

## 2023-10-30 RX ORDER — ZOLPIDEM TARTRATE 10 MG/1
10 TABLET ORAL NIGHTLY PRN
Qty: 30 TABLET | Refills: 2 | Status: SHIPPED | OUTPATIENT
Start: 2023-11-15 | End: 2023-12-15

## 2023-10-30 ASSESSMENT — PATIENT HEALTH QUESTIONNAIRE - PHQ9
1. LITTLE INTEREST OR PLEASURE IN DOING THINGS: 0
SUM OF ALL RESPONSES TO PHQ QUESTIONS 1-9: 0
SUM OF ALL RESPONSES TO PHQ9 QUESTIONS 1 & 2: 0
SUM OF ALL RESPONSES TO PHQ QUESTIONS 1-9: 0
SUM OF ALL RESPONSES TO PHQ QUESTIONS 1-9: 0
2. FEELING DOWN, DEPRESSED OR HOPELESS: 0
SUM OF ALL RESPONSES TO PHQ QUESTIONS 1-9: 0
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
## 2023-10-31 ENCOUNTER — OFFICE VISIT (OUTPATIENT)
Dept: UROLOGY | Age: 48
End: 2023-10-31
Payer: MEDICARE

## 2023-10-31 DIAGNOSIS — R33.9 RETENTION OF URINE, UNSPECIFIED: Primary | ICD-10-CM

## 2023-10-31 DIAGNOSIS — N35.92 STRICTURE OF FEMALE URETHRA, UNSPECIFIED STRICTURE TYPE: ICD-10-CM

## 2023-10-31 DIAGNOSIS — N39.0 URINARY TRACT INFECTION WITHOUT HEMATURIA, SITE UNSPECIFIED: ICD-10-CM

## 2023-10-31 LAB
BILIRUBIN, URINE, POC: NEGATIVE
BLOOD URINE, POC: NEGATIVE
GLUCOSE URINE, POC: NEGATIVE
KETONES, URINE, POC: NEGATIVE
LEUKOCYTE ESTERASE, URINE, POC: NEGATIVE
NITRITE, URINE, POC: NEGATIVE
PH, URINE, POC: 6.5 (ref 4.6–8)
PROTEIN,URINE, POC: NEGATIVE
PVR, POC: 174 CC
SPECIFIC GRAVITY, URINE, POC: 1.01 (ref 1–1.03)
URINALYSIS CLARITY, POC: NORMAL
URINALYSIS COLOR, POC: NORMAL
UROBILINOGEN, POC: NORMAL

## 2023-10-31 PROCEDURE — 1036F TOBACCO NON-USER: CPT | Performed by: NURSE PRACTITIONER

## 2023-10-31 PROCEDURE — 51798 US URINE CAPACITY MEASURE: CPT | Performed by: NURSE PRACTITIONER

## 2023-10-31 PROCEDURE — G8484 FLU IMMUNIZE NO ADMIN: HCPCS | Performed by: NURSE PRACTITIONER

## 2023-10-31 PROCEDURE — G8428 CUR MEDS NOT DOCUMENT: HCPCS | Performed by: NURSE PRACTITIONER

## 2023-10-31 PROCEDURE — 81003 URINALYSIS AUTO W/O SCOPE: CPT | Performed by: NURSE PRACTITIONER

## 2023-10-31 PROCEDURE — G8419 CALC BMI OUT NRM PARAM NOF/U: HCPCS | Performed by: NURSE PRACTITIONER

## 2023-10-31 PROCEDURE — 99214 OFFICE O/P EST MOD 30 MIN: CPT | Performed by: NURSE PRACTITIONER

## 2023-10-31 ASSESSMENT — ENCOUNTER SYMPTOMS
NAUSEA: 0
BACK PAIN: 0

## 2023-10-31 NOTE — PROGRESS NOTES
Smokeless tobacco: Never    Tobacco comments:     Quit smokin yrPPD   Vaping Use    Vaping Use: Never used   Substance and Sexual Activity    Alcohol use: No     Alcohol/week: 0.0 standard drinks of alcohol    Drug use: No    Sexual activity: Not on file   Other Topics Concern    Not on file   Social History Narrative    , not working. 2 children, 5 grandchildren     Social Determinants of Health     Financial Resource Strain: Low Risk  (2023)    Overall Financial Resource Strain (CARDIA)     Difficulty of Paying Living Expenses: Not hard at all   Food Insecurity: No Food Insecurity (2023)    Hunger Vital Sign     Worried About Running Out of Food in the Last Year: Never true     Ran Out of Food in the Last Year: Never true   Transportation Needs: Unknown (2023)    PRAPARE - Transportation     Lack of Transportation (Medical): Not on file     Lack of Transportation (Non-Medical): No   Physical Activity: Sufficiently Active (2023)    Exercise Vital Sign     Days of Exercise per Week: 5 days     Minutes of Exercise per Session: 40 min   Stress: Not on file   Social Connections: Not on file   Intimate Partner Violence: Not on file   Housing Stability: Unknown (2023)    Housing Stability Vital Sign     Unable to Pay for Housing in the Last Year: Not on file     Number of Places Lived in the Last Year: Not on file     Unstable Housing in the Last Year: No     Family History   Problem Relation Age of Onset    Other Mother         bipolar disorder    COPD Mother     High Cholesterol Mother     Diabetes Father     Hypertension Father     Diabetes Sister     Hypertension Sister     Cancer Father     Breast Cancer Neg Hx        Review of Systems  Constitutional:   Negative for fever. GI:  Negative for nausea. Musculoskeletal:  Negative for back pain.       Urinalysis  UA - Dipstick  Results for orders placed or performed in visit on 10/31/23   AMB POC URINALYSIS DIP STICK AUTO W/O

## 2023-11-01 ENCOUNTER — TELEPHONE (OUTPATIENT)
Dept: UROLOGY | Age: 48
End: 2023-11-01

## 2023-11-01 DIAGNOSIS — R39.14 FEELING OF INCOMPLETE BLADDER EMPTYING: ICD-10-CM

## 2023-11-01 DIAGNOSIS — R33.9 RETENTION OF URINE, UNSPECIFIED: ICD-10-CM

## 2023-11-01 RX ORDER — TAMSULOSIN HYDROCHLORIDE 0.4 MG/1
0.4 CAPSULE ORAL 2 TIMES DAILY
Qty: 180 CAPSULE | Refills: 3 | Status: SHIPPED | OUTPATIENT
Start: 2023-11-01

## 2023-11-01 RX ORDER — ESTRADIOL 0.1 MG/G
CREAM VAGINAL
Qty: 42.5 G | Refills: 5 | Status: SHIPPED | OUTPATIENT
Start: 2023-11-01

## 2023-11-01 NOTE — TELEPHONE ENCOUNTER
Pt called stating that a hormone cream and flomax refill was supposed to be sent to pharmacy, pt checked with pharmacy, Devin Stoll and Yogesh Chase, on [address] [facility], and pharmacy said they had not received anything yet.

## 2023-11-15 ENCOUNTER — TELEPHONE (OUTPATIENT)
Dept: BEHAVIORAL/MENTAL HEALTH CLINIC | Age: 48
End: 2023-11-15

## 2023-11-15 RX ORDER — DIVALPROEX SODIUM 250 MG/1
250 TABLET, EXTENDED RELEASE ORAL
Qty: 30 TABLET | Refills: 3 | Status: SHIPPED | OUTPATIENT
Start: 2023-11-15 | End: 2023-12-15

## 2023-11-15 NOTE — TELEPHONE ENCOUNTER
Patient called stating her Depakote 250 mg was not sent in, only the 500 mg was. Please review and pend to Elkin Lin if appropriate and let the patient know.  Thanks
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
## 2023-12-04 ENCOUNTER — OFFICE VISIT (OUTPATIENT)
Dept: NEUROLOGY | Age: 48
End: 2023-12-04
Payer: MEDICARE

## 2023-12-04 VITALS — DIASTOLIC BLOOD PRESSURE: 99 MMHG | HEART RATE: 81 BPM | SYSTOLIC BLOOD PRESSURE: 163 MMHG | OXYGEN SATURATION: 98 %

## 2023-12-04 DIAGNOSIS — G43.709 CHRONIC MIGRAINE WITHOUT AURA WITHOUT STATUS MIGRAINOSUS, NOT INTRACTABLE: Primary | ICD-10-CM

## 2023-12-04 PROCEDURE — 64615 CHEMODENERV MUSC MIGRAINE: CPT

## 2023-12-04 NOTE — PROGRESS NOTES
[facility], [address] [facility] [address], [address]  Phone: [phone] Fax [phone]  DIDIER Levy          Patient: Vera Sutherland  Provider: DIDIER Levy       Procedure note: Botulinum Toxin injections     Indication: Chronic Migraine        Subjective:      Patient present for chemodenervation for chronic migraine. The patient received her last Botox injections in September of 2023. The patient was referred by Dr. Fredy Zhao, but will continue to follow Dr. Brady Flynn for medication management. These notes have been reviewed. The patient reports no more than several headache days since last treatment. The treatments have significantly reduced the frequency and severity of headaches; however, she tends to wear off 1-2 weeks prior to next injection cycle. Her headaches are described as diffuse and bitemporal head pain. Symptoms reported to be associated with headaches include photophobia, phonophobia, nausea, and vomiting. The patient has previously tolerated the injections without difficulty or adverse effects. She taking Ubrelvy for breakthrough headaches. Reviewed and discussed her VS. Strongly encouraged to contact her PCP for further evaluation. Patient reports having an upcoming appointment scheduled and she plans to discuss at that time. Past medical history, surgical history, social history, family history, medications and allergies were all reviewed and updated as appropriate.       Outpatient Encounter Medications as of 12/4/2023   Medication Sig Dispense Refill    divalproex (DEPAKOTE ER) 250 MG extended release tablet Take 1 tablet by mouth nightly 30 tablet 3    tamsulosin (FLOMAX) 0.4 MG capsule Take 1 capsule by mouth in the morning and at bedtime 180 capsule 3    estradiol (ESTRACE VAGINAL) 0.1 MG/GM vaginal cream Use a dab of cream to urethra everynight 42.5 g 5    divalproex (DEPAKOTE ER) 500 MG extended release tablet Take

## 2023-12-10 ENCOUNTER — HOSPITAL ENCOUNTER (EMERGENCY)
Age: 48
Discharge: OTHER INSTITUTION | End: 2023-12-11
Attending: EMERGENCY MEDICINE
Payer: MEDICARE

## 2023-12-10 DIAGNOSIS — F23 ACUTE PSYCHOSIS (HCC): ICD-10-CM

## 2023-12-10 DIAGNOSIS — R45.851 SUICIDAL IDEATION: Primary | ICD-10-CM

## 2023-12-10 DIAGNOSIS — F32.A DEPRESSION WITH SUICIDAL IDEATION: ICD-10-CM

## 2023-12-10 DIAGNOSIS — R45.851 DEPRESSION WITH SUICIDAL IDEATION: ICD-10-CM

## 2023-12-10 LAB
ALBUMIN SERPL-MCNC: 4 G/DL (ref 3.5–5)
ALBUMIN/GLOB SERPL: 1.2 (ref 0.4–1.6)
ALP SERPL-CCNC: 123 U/L (ref 50–136)
ALT SERPL-CCNC: 15 U/L (ref 12–65)
AMPHET UR QL SCN: NEGATIVE
ANION GAP SERPL CALC-SCNC: 5 MMOL/L (ref 2–11)
AST SERPL-CCNC: 13 U/L (ref 15–37)
BASOPHILS # BLD: 0.1 K/UL (ref 0–0.2)
BASOPHILS NFR BLD: 1 % (ref 0–2)
BENZODIAZ UR QL: NEGATIVE
BILIRUB SERPL-MCNC: 0.3 MG/DL (ref 0.2–1.1)
BUN SERPL-MCNC: 16 MG/DL (ref 6–23)
CALCIUM SERPL-MCNC: 9.4 MG/DL (ref 8.3–10.4)
CANNABINOIDS UR QL SCN: POSITIVE
CHLORIDE SERPL-SCNC: 107 MMOL/L (ref 103–113)
CO2 SERPL-SCNC: 28 MMOL/L (ref 21–32)
COCAINE UR QL SCN: NEGATIVE
CREAT SERPL-MCNC: 0.7 MG/DL (ref 0.6–1)
DIFFERENTIAL METHOD BLD: ABNORMAL
EOSINOPHIL # BLD: 0.1 K/UL (ref 0–0.8)
EOSINOPHIL NFR BLD: 2 % (ref 0.5–7.8)
ERYTHROCYTE [DISTWIDTH] IN BLOOD BY AUTOMATED COUNT: 11.8 % (ref 11.9–14.6)
ETHANOL SERPL-MCNC: <3 MG/DL (ref 0–0.08)
GLOBULIN SER CALC-MCNC: 3.4 G/DL (ref 2.8–4.5)
GLUCOSE SERPL-MCNC: 115 MG/DL (ref 65–100)
HCT VFR BLD AUTO: 43.9 % (ref 35.8–46.3)
HGB BLD-MCNC: 15.4 G/DL (ref 11.7–15.4)
IMM GRANULOCYTES # BLD AUTO: 0 K/UL (ref 0–0.5)
IMM GRANULOCYTES NFR BLD AUTO: 0 % (ref 0–5)
LYMPHOCYTES # BLD: 2.3 K/UL (ref 0.5–4.6)
LYMPHOCYTES NFR BLD: 37 % (ref 13–44)
MAGNESIUM SERPL-MCNC: 2 MG/DL (ref 1.8–2.4)
MCH RBC QN AUTO: 34.8 PG (ref 26.1–32.9)
MCHC RBC AUTO-ENTMCNC: 35.1 G/DL (ref 31.4–35)
MCV RBC AUTO: 99.3 FL (ref 82–102)
MONOCYTES # BLD: 0.3 K/UL (ref 0.1–1.3)
MONOCYTES NFR BLD: 5 % (ref 4–12)
NEUTS SEG # BLD: 3.2 K/UL (ref 1.7–8.2)
NEUTS SEG NFR BLD: 55 % (ref 43–78)
NRBC # BLD: 0 K/UL (ref 0–0.2)
OPIATES UR QL: NEGATIVE
PLATELET # BLD AUTO: 166 K/UL (ref 150–450)
PMV BLD AUTO: 11.6 FL (ref 9.4–12.3)
POTASSIUM SERPL-SCNC: 3.7 MMOL/L (ref 3.5–5.1)
PROT SERPL-MCNC: 7.4 G/DL (ref 6.3–8.2)
RBC # BLD AUTO: 4.42 M/UL (ref 4.05–5.2)
SODIUM SERPL-SCNC: 140 MMOL/L (ref 136–146)
TSH, 3RD GENERATION: 2.06 UIU/ML (ref 0.36–3.74)
VALPROATE SERPL-MCNC: 57 UG/ML (ref 50–100)
WBC # BLD AUTO: 6 K/UL (ref 4.3–11.1)

## 2023-12-10 PROCEDURE — 6360000002 HC RX W HCPCS: Performed by: EMERGENCY MEDICINE

## 2023-12-10 PROCEDURE — 6370000000 HC RX 637 (ALT 250 FOR IP): Performed by: EMERGENCY MEDICINE

## 2023-12-10 PROCEDURE — 84443 ASSAY THYROID STIM HORMONE: CPT

## 2023-12-10 PROCEDURE — 96372 THER/PROPH/DIAG INJ SC/IM: CPT

## 2023-12-10 PROCEDURE — 80053 COMPREHEN METABOLIC PANEL: CPT

## 2023-12-10 PROCEDURE — 80307 DRUG TEST PRSMV CHEM ANLYZR: CPT

## 2023-12-10 PROCEDURE — 99285 EMERGENCY DEPT VISIT HI MDM: CPT

## 2023-12-10 PROCEDURE — 82077 ASSAY SPEC XCP UR&BREATH IA: CPT

## 2023-12-10 PROCEDURE — 85025 COMPLETE CBC W/AUTO DIFF WBC: CPT

## 2023-12-10 PROCEDURE — 83735 ASSAY OF MAGNESIUM: CPT

## 2023-12-10 PROCEDURE — 80164 ASSAY DIPROPYLACETIC ACD TOT: CPT

## 2023-12-10 RX ORDER — ZOLPIDEM TARTRATE 5 MG/1
10 TABLET ORAL NIGHTLY
Status: DISCONTINUED | OUTPATIENT
Start: 2023-12-11 | End: 2023-12-11 | Stop reason: HOSPADM

## 2023-12-10 RX ORDER — DIVALPROEX SODIUM 250 MG/1
1000 TABLET, EXTENDED RELEASE ORAL NIGHTLY
Status: DISCONTINUED | OUTPATIENT
Start: 2023-12-10 | End: 2023-12-10

## 2023-12-10 RX ORDER — ZOLPIDEM TARTRATE 5 MG/1
5 TABLET ORAL NIGHTLY
Status: DISCONTINUED | OUTPATIENT
Start: 2023-12-10 | End: 2023-12-10

## 2023-12-10 RX ORDER — GABAPENTIN 300 MG/1
600 CAPSULE ORAL 3 TIMES DAILY
Status: DISCONTINUED | OUTPATIENT
Start: 2023-12-10 | End: 2023-12-11 | Stop reason: HOSPADM

## 2023-12-10 RX ORDER — LISINOPRIL 20 MG/1
20 TABLET ORAL DAILY
Status: DISCONTINUED | OUTPATIENT
Start: 2023-12-10 | End: 2023-12-11 | Stop reason: HOSPADM

## 2023-12-10 RX ORDER — CLONAZEPAM 1 MG/1
1 TABLET ORAL 2 TIMES DAILY PRN
Status: DISCONTINUED | OUTPATIENT
Start: 2023-12-10 | End: 2023-12-11 | Stop reason: HOSPADM

## 2023-12-10 RX ORDER — ZIPRASIDONE HYDROCHLORIDE 20 MG/1
20 CAPSULE ORAL
Status: DISCONTINUED | OUTPATIENT
Start: 2023-12-10 | End: 2023-12-10

## 2023-12-10 RX ORDER — HALOPERIDOL 5 MG/ML
5 INJECTION INTRAMUSCULAR
Status: COMPLETED | OUTPATIENT
Start: 2023-12-10 | End: 2023-12-10

## 2023-12-10 RX ORDER — TAMSULOSIN HYDROCHLORIDE 0.4 MG/1
0.4 CAPSULE ORAL 2 TIMES DAILY
Status: DISCONTINUED | OUTPATIENT
Start: 2023-12-11 | End: 2023-12-11 | Stop reason: HOSPADM

## 2023-12-10 RX ORDER — ACETAMINOPHEN 500 MG
1000 TABLET ORAL
Status: COMPLETED | OUTPATIENT
Start: 2023-12-10 | End: 2023-12-10

## 2023-12-10 RX ORDER — TAMSULOSIN HYDROCHLORIDE 0.4 MG/1
0.4 CAPSULE ORAL ONCE
Status: COMPLETED | OUTPATIENT
Start: 2023-12-10 | End: 2023-12-10

## 2023-12-10 RX ORDER — LORAZEPAM 2 MG/ML
2 INJECTION INTRAMUSCULAR ONCE
Status: COMPLETED | OUTPATIENT
Start: 2023-12-10 | End: 2023-12-10

## 2023-12-10 RX ORDER — CLONIDINE HYDROCHLORIDE 0.1 MG/1
0.1 TABLET ORAL 2 TIMES DAILY
Status: DISCONTINUED | OUTPATIENT
Start: 2023-12-10 | End: 2023-12-11 | Stop reason: HOSPADM

## 2023-12-10 RX ORDER — ATORVASTATIN CALCIUM 10 MG/1
10 TABLET, FILM COATED ORAL NIGHTLY
Status: DISCONTINUED | OUTPATIENT
Start: 2023-12-10 | End: 2023-12-11 | Stop reason: HOSPADM

## 2023-12-10 RX ORDER — PANTOPRAZOLE SODIUM 40 MG/1
40 TABLET, DELAYED RELEASE ORAL
Status: DISCONTINUED | OUTPATIENT
Start: 2023-12-11 | End: 2023-12-11 | Stop reason: HOSPADM

## 2023-12-10 RX ORDER — LORAZEPAM 1 MG/1
2 TABLET ORAL ONCE
Status: DISCONTINUED | OUTPATIENT
Start: 2023-12-10 | End: 2023-12-10

## 2023-12-10 RX ORDER — PROPRANOLOL HCL 60 MG
60 CAPSULE, EXTENDED RELEASE 24HR ORAL DAILY
Status: DISCONTINUED | OUTPATIENT
Start: 2023-12-11 | End: 2023-12-11 | Stop reason: HOSPADM

## 2023-12-10 RX ADMIN — ZOLPIDEM TARTRATE 5 MG: 5 TABLET ORAL at 20:47

## 2023-12-10 RX ADMIN — CLONAZEPAM 1 MG: 1 TABLET ORAL at 20:54

## 2023-12-10 RX ADMIN — DIVALPROEX SODIUM 1000 MG: 250 TABLET, EXTENDED RELEASE ORAL at 20:48

## 2023-12-10 RX ADMIN — CLONIDINE HYDROCHLORIDE 0.1 MG: 0.1 TABLET ORAL at 20:45

## 2023-12-10 RX ADMIN — ACETAMINOPHEN 1000 MG: 500 TABLET, FILM COATED ORAL at 21:41

## 2023-12-10 RX ADMIN — HALOPERIDOL LACTATE 5 MG: 5 INJECTION, SOLUTION INTRAMUSCULAR at 14:21

## 2023-12-10 RX ADMIN — GABAPENTIN 600 MG: 300 CAPSULE ORAL at 20:48

## 2023-12-10 RX ADMIN — ATORVASTATIN CALCIUM 10 MG: 10 TABLET, FILM COATED ORAL at 20:45

## 2023-12-10 RX ADMIN — TAMSULOSIN HYDROCHLORIDE 0.4 MG: 0.4 CAPSULE ORAL at 21:41

## 2023-12-10 RX ADMIN — LORAZEPAM 2 MG: 2 INJECTION INTRAMUSCULAR; INTRAVENOUS at 14:20

## 2023-12-10 ASSESSMENT — PAIN DESCRIPTION - ORIENTATION: ORIENTATION: ANTERIOR

## 2023-12-10 ASSESSMENT — ENCOUNTER SYMPTOMS
PHOTOPHOBIA: 0
COLOR CHANGE: 0
BACK PAIN: 0
CHOKING: 0
VOMITING: 0
EYE REDNESS: 0
CHEST TIGHTNESS: 0

## 2023-12-10 ASSESSMENT — PAIN SCALES - GENERAL
PAINLEVEL_OUTOF10: 9
PAINLEVEL_OUTOF10: 9

## 2023-12-10 ASSESSMENT — PAIN DESCRIPTION - DESCRIPTORS
DESCRIPTORS: THROBBING
DESCRIPTORS: THROBBING

## 2023-12-10 ASSESSMENT — PAIN DESCRIPTION - LOCATION
LOCATION: HEAD
LOCATION: HEAD

## 2023-12-10 NOTE — ED NOTES
Pt resting with eyes closed and even respirations. Sitter remains one on one.       Mary Eid, [address]  12/10/23 2137

## 2023-12-10 NOTE — ED NOTES
Pt grabbed her paper shirt tight around her neck stating she wanted to kill herself. The paper shirt was torn. Security was present. Provider aware.       Mague Sow, [address]  12/10/23 7518

## 2023-12-10 NOTE — ED TRIAGE NOTES
Pt ambulatory to triage c/o suicidal ideation. Pt states \" I want to slit my throat. \" Charge nurse notified.

## 2023-12-10 NOTE — ED NOTES
Pt provided evening meal tray and drink. Pt alert and denies any other needs at this time.       Татьяна Galvez [place]  12/10/23 1825

## 2023-12-10 NOTE — ED NOTES
Pt alert, cooperative, and willing to answer questions.       Kelsey Torrez, [address] [place] [address]  12/10/23 7229

## 2023-12-10 NOTE — ED NOTES
Pt resting with eyes closed and even respirations. Sitter remains one on one.       Na English [place]  12/10/23 1725
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
## 2023-12-10 NOTE — ED NOTES
Patient was brought back to room seated on wheelchair and she states she wanted to take a knife and slit her throat. She then grabbed the strings on the front of her sweater and attempted to tighten them around her neck. Staff was able to release the string, coached to patient with redirection. She was then changed into paper scrubs with  belongings placed in belonging bag and secured behind protective door of room. 8 rings were taken off of her hands along with  lip ring and given to her , Weston Cameron. Patient was wand by security. Suicide precautions in place.       Samaria Siegel  12/10/23 3114

## 2023-12-11 VITALS
TEMPERATURE: 97.7 F | WEIGHT: 176 LBS | HEART RATE: 69 BPM | SYSTOLIC BLOOD PRESSURE: 141 MMHG | RESPIRATION RATE: 18 BRPM | DIASTOLIC BLOOD PRESSURE: 76 MMHG | HEIGHT: 66 IN | BODY MASS INDEX: 28.28 KG/M2 | OXYGEN SATURATION: 97 %

## 2023-12-11 PROCEDURE — 6370000000 HC RX 637 (ALT 250 FOR IP): Performed by: EMERGENCY MEDICINE

## 2023-12-11 RX ADMIN — PROPRANOLOL HYDROCHLORIDE 60 MG: 60 CAPSULE, EXTENDED RELEASE ORAL at 10:04

## 2023-12-11 RX ADMIN — LISINOPRIL 20 MG: 20 TABLET ORAL at 07:58

## 2023-12-11 RX ADMIN — PANTOPRAZOLE SODIUM 40 MG: 40 TABLET, DELAYED RELEASE ORAL at 07:58

## 2023-12-11 RX ADMIN — GABAPENTIN 600 MG: 300 CAPSULE ORAL at 08:00

## 2023-12-11 RX ADMIN — TAMSULOSIN HYDROCHLORIDE 0.4 MG: 0.4 CAPSULE ORAL at 07:58

## 2023-12-11 ASSESSMENT — PAIN - FUNCTIONAL ASSESSMENT: PAIN_FUNCTIONAL_ASSESSMENT: NONE - DENIES PAIN

## 2023-12-11 NOTE — ED NOTES
Patient resting at this time. Respirations are unlabored and even. No signs of distress noted. Safety precautions in place. Sitter at bedside.       Samaria Cheney  12/11/23 0698

## 2023-12-11 NOTE — ED NOTES
Pt laying in bed resting. Breathing even and unlabored. No signs of respiratory distress. Suicide precautions in place. Sitter at the bedside.      Sandra Bello RN  12/11/23 6274

## 2023-12-11 NOTE — CARE COORDINATION
CM received call from [facility] from [facility], pt has been accepted for admission to Beta unit by Dr Woo Mohamud. Report to be called to [phone]. [address] ED Charge to call and arrange ride with police department. Dr Schrader Oz completing dc with [employer].      12/11/23 1773   Service Assessment   Patient Orientation Alert and Oriented   Cognition Alert   Discharge Planning   Type of Residence Behavioral Health   Patient expects to be discharged to: Behavioral Health/Substance/Detox   Services At/After Discharge   Transition of Care Consult (CM Consult) Discharge Planning   Mode of Transport at Discharge Other (see comment)  (Police department)

## 2023-12-11 NOTE — ED NOTES
Pt laying in bed resting. Breathing even and unlabored. No signs of respiratory distress. Suicide precautions in place. Sitter at the bedside.       Brennon Emmanuel RN  12/11/23 9350

## 2023-12-11 NOTE — ED NOTES
Pt laying in bed resting. Breathing even and unlabored. No signs of respiratory distress. Suicide precautions in place. Sitter at the bedside.       Keyur Rodriguez RN  12/10/23 4743

## 2023-12-11 NOTE — ED NOTES
Pt laying in bed resting. Breathing even and unlabored. No signs of respiratory distress. Suicide precautions in place. Sitter at the bedside.       Lore Ruiz RN  12/11/23 6170

## 2023-12-11 NOTE — ED NOTES
Pt laying in bed resting. Breathing even and unlabored. No signs of respiratory distress. Suicide precautions in place. Sitter at the bedside.       Benjamin Medina RN  12/11/23 2726

## 2023-12-11 NOTE — ED NOTES
Pt laying in bed resting. Breathing even and unlabored. No signs of respiratory distress. Suicide precautions in place. Sitter at bedside.       Nora Kyle RN  12/11/23 9508

## 2023-12-11 NOTE — ACP (ADVANCE CARE PLANNING)
Advance Care Planning   Healthcare Decision Maker:    Primary Decision Maker: Marylu Carr - [facility] - [phone]    Click here to complete Healthcare Decision Makers including selection of the Healthcare Decision Maker Relationship (ie \"Primary\"). Today we documented Decision Maker(s) consistent with Legal Next of Kin hierarchy.

## 2023-12-11 NOTE — ED NOTES
Pt laying in bed resting. Breathing even and unlabored. No signs of respiratory distress. Suicide precautions in place. Sitter at the bedside.       Denise Carrion RN  12/11/23 0700

## 2023-12-11 NOTE — ED NOTES
Patient resting at this time. Respirations are unlabored and even. No signs of distress noted. Safety precautions in place. Sitter at bedside.       Steffi Muñoz, [address]  12/11/23 5692

## 2023-12-11 NOTE — ED NOTES
Informed Dr Alta Rodas the Pt was asking for some thing for her headache. Dr Alta Rodas said he would put in an order for tylenol.      Uziel Huose RN  12/10/23 2007

## 2023-12-11 NOTE — ED NOTES
Pt laying in bed resting. Breathing even and unlabored. No signs of respiratory distress. Suicide precautions in place. Sitter at the bedside.       Reta Cabello RN  12/10/23 7721

## 2023-12-11 NOTE — ED NOTES
Patient resting at this time. Respirations are unlabored and even. No signs of distress noted. Safety precautions in place. Sitter at bedside.       Mariel Sears RN  12/11/23 6161

## 2023-12-11 NOTE — ED NOTES
TRANSFER - OUT REPORT:    Verbal report given to Destin Jefferson RN on Jordan Drafts  being transferred to [facility] for routine progression of patient care       Report consisted of patient's Situation, Background, Assessment and   Recommendations(SBAR). Information from the following report(s) ED Encounter Summary was reviewed with the receiving nurse. [place] Fall Assessment:    Presents to emergency department  because of falls (Syncope, seizure, or loss of consciousness): No  Age > 70: No  Altered Mental Status, Intoxication with alcohol or substance confusion (Disorientation, impaired judgment, poor safety awaremess, or inability to follow instructions): No  Impaired Mobility: Ambulates or transfers with assistive devices or assistance; Unable to ambulate or transer.: No             Lines:       Opportunity for questions and clarification was provided.       Patient transported with:  Tevin Freeman RN  12/11/23 1847

## 2023-12-11 NOTE — ED NOTES
Informed Dr Maria Guadalupe Ball that the Pt stated we have the incorrect dosage for her Depakote, we have 1000 mg, but it should be 1250 mg; we have her Ambien at 5mg, but she says it should be 10 mg. Also, she further stated that she needs to take her Flomax tonight, it is 2x daily and she has not taken it tonight.       Irene Raines RN  12/10/23 1241

## 2023-12-11 NOTE — ED NOTES
Pt conversant and reports having a headache. Provider aware.       Lina Julio, [address]  12/10/23 6612

## 2023-12-11 NOTE — CARE COORDINATION
CM met with pt at bedside, pt made aware she is currently on IVC papers by recommendation for the Tele-Psych MD. Pt states she came to the ED c/o increased depression and while waiting to be seen tried to harm self. Pt is alert and oriented x4, pt is upset d/t she is on IVC papers, CM explained that inpt psych placement will be attempted at this time. Pt states her last admission to [facility] was in 2021 pt states she is seen by Srikanth Belcher NP Psych and was last seen in November 2023, pt states she has been complaint with medications. Demographics, insurance and PCP confirmed. CM staff has made referrals to [facility],  [address] and [facility]    CM staff will remain available to assist with dc needs. 12/11/23 0983   Service Assessment   Patient Orientation Alert and Oriented   Cognition Alert   History Provided By Patient   Primary Caregiver Self   Accompanied By/Relationship none   Support Systems Spouse/Significant Other   Patient's Healthcare Decision Maker is: Legal Next of [address] [place] [address]   PCP Verified by CM Yes  Vinny Marinelli NP  last visit June 2023)   Last Visit to PCP Within last 6 months   Prior Functional Level Independent in ADLs/IADLs   Current Functional Level Independent in ADLs/IADLs   Can patient return to prior living arrangement Yes   Ability to make needs known: Good   Family able to assist with home care needs: Yes   Would you like for me to discuss the discharge plan with any other family members/significant others, and if so, who?  Yes   Financial Resources Medicare   Community Resources None   CM/SW Referral Psychiatry   Social/Functional History   Lives With Spouse   Type of [address] [place] [address] None   Receives Help From Family   ADL Assistance Independent   Ambulation Assistance Independent   Transfer Assistance Independent   Active  Yes   Occupation On disability   Discharge [facility]

## 2023-12-11 NOTE — ED NOTES
Pt laying in bed resting. Breathing even and unlabored. No signs of respiratory distress. Suicide precautions in place. Sitter at the bedside.      Dalila Hooker RN  12/11/23 5039

## 2023-12-11 NOTE — ED NOTES
Pt laying in bed resting. Breathing even and unlabored. No signs of respiratory distress. Suicide precautions in place. Sitter at the bedside.       Lianna Gomez RN  12/10/23 9132

## 2023-12-11 NOTE — ED NOTES
Pt laying in bed resting. Breathing even and unlabored. No signs of respiratory distress. Suicide precautions in place. Sitter at the bedside.       Shayna Mann RN  12/11/23 4975

## 2023-12-11 NOTE — ED PROVIDER NOTES
[facility] was accepted at [facility].   Will transfer for further treatment     Sita Chawla MD  12/11/23 9633
as needed for Sleep for up to 30 days.         Results for orders placed or performed during the hospital encounter of 12/10/23   CBC with Auto Differential   Result Value Ref Range    WBC 6.0 4.3 - 11.1 K/uL    RBC 4.42 4.05 - 5.2 M/uL    Hemoglobin 15.4 11.7 - 15.4 g/dL    Hematocrit 43.9 35.8 - 46.3 %    MCV 99.3 82 - 102 FL    MCH 34.8 (H) 26.1 - 32.9 PG    MCHC 35.1 (H) 31.4 - 35.0 g/dL    RDW 11.8 (L) 11.9 - 14.6 %    Platelets 440 587 - 973 K/uL    MPV 11.6 9.4 - 12.3 FL    nRBC 0.00 0.0 - 0.2 K/uL    Differential Type AUTOMATED      Neutrophils % 55 43 - 78 %    Lymphocytes % 37 13 - 44 %    Monocytes % 5 4.0 - 12.0 %    Eosinophils % 2 0.5 - 7.8 %    Basophils % 1 0.0 - 2.0 %    Immature Granulocytes 0 0.0 - 5.0 %    Neutrophils Absolute 3.2 1.7 - 8.2 K/UL    Lymphocytes Absolute 2.3 0.5 - 4.6 K/UL    Monocytes Absolute 0.3 0.1 - 1.3 K/UL    Eosinophils Absolute 0.1 0.0 - 0.8 K/UL    Basophils Absolute 0.1 0.0 - 0.2 K/UL    Absolute Immature Granulocyte 0.0 0.0 - 0.5 K/UL   CMP   Result Value Ref Range    Sodium 140 136 - 146 mmol/L    Potassium 3.7 3.5 - 5.1 mmol/L    Chloride 107 103 - 113 mmol/L    CO2 28 21 - 32 mmol/L    Anion Gap 5 2 - 11 mmol/L    Glucose 115 (H) 65 - 100 mg/dL    BUN 16 6 - 23 MG/DL    Creatinine 0.70 0.6 - 1.0 MG/DL    Est, Glom Filt Rate >60 >60 ml/min/1.73m2    Calcium 9.4 8.3 - 10.4 MG/DL    Total Bilirubin 0.3 0.2 - 1.1 MG/DL    ALT 15 12 - 65 U/L    AST 13 (L) 15 - 37 U/L    Alk Phosphatase 123 50 - 136 U/L    Total Protein 7.4 6.3 - 8.2 g/dL    Albumin 4.0 3.5 - 5.0 g/dL    Globulin 3.4 2.8 - 4.5 g/dL    Albumin/Globulin Ratio 1.2 0.4 - 1.6     Magnesium   Result Value Ref Range    Magnesium 2.0 1.8 - 2.4 mg/dL   TSH   Result Value Ref Range    TSH, 3RD GENERATION 2.060 0.358 - 3.740 uIU/mL   Ethanol   Result Value Ref Range    Ethanol Lvl <3 MG/DL   Drug Screen Psych, Urine   Result Value Ref Range    Benzodiazepines, Urine Negative NEG      Opiates, Urine Negative NEG

## 2023-12-11 NOTE — ED NOTES
Report given to Ashok Mcmahan RN and care assumed at this time.       Toño Sheridan, [address]  12/10/23 1940

## 2023-12-18 DIAGNOSIS — M54.50 CHRONIC MIDLINE LOW BACK PAIN WITHOUT SCIATICA: ICD-10-CM

## 2023-12-18 DIAGNOSIS — M79.2 NEUROPATHIC PAIN: ICD-10-CM

## 2023-12-18 DIAGNOSIS — G89.29 CHRONIC MIDLINE LOW BACK PAIN WITHOUT SCIATICA: ICD-10-CM

## 2023-12-18 RX ORDER — GABAPENTIN 300 MG/1
600 CAPSULE ORAL 3 TIMES DAILY
Qty: 180 CAPSULE | Refills: 5 | Status: SHIPPED | OUTPATIENT
Start: 2023-12-18 | End: 2024-01-04 | Stop reason: SDUPTHER

## 2023-12-27 ENCOUNTER — TELEMEDICINE (OUTPATIENT)
Dept: BEHAVIORAL/MENTAL HEALTH CLINIC | Age: 48
End: 2023-12-27
Payer: MEDICARE

## 2023-12-27 DIAGNOSIS — F41.1 GAD (GENERALIZED ANXIETY DISORDER): ICD-10-CM

## 2023-12-27 DIAGNOSIS — F31.75 BIPOLAR 1 DISORDER, DEPRESSED, PARTIAL REMISSION (HCC): Primary | ICD-10-CM

## 2023-12-27 DIAGNOSIS — F51.04 INSOMNIA, PSYCHOPHYSIOLOGICAL: ICD-10-CM

## 2023-12-27 PROCEDURE — 3017F COLORECTAL CA SCREEN DOC REV: CPT | Performed by: NURSE PRACTITIONER

## 2023-12-27 PROCEDURE — 99215 OFFICE O/P EST HI 40 MIN: CPT | Performed by: NURSE PRACTITIONER

## 2023-12-27 PROCEDURE — G8427 DOCREV CUR MEDS BY ELIG CLIN: HCPCS | Performed by: NURSE PRACTITIONER

## 2023-12-27 RX ORDER — TRAZODONE HYDROCHLORIDE 150 MG/1
150 TABLET ORAL NIGHTLY
Qty: 30 TABLET | Refills: 3 | Status: SHIPPED | OUTPATIENT
Start: 2023-12-27 | End: 2024-01-26

## 2023-12-27 RX ORDER — BUSPIRONE HYDROCHLORIDE 5 MG/1
10 TABLET ORAL 2 TIMES DAILY
Qty: 120 TABLET | Refills: 3 | Status: SHIPPED | OUTPATIENT
Start: 2023-12-27 | End: 2023-12-27

## 2023-12-27 RX ORDER — TRAZODONE HYDROCHLORIDE 100 MG/1
TABLET ORAL
COMMUNITY
Start: 2023-12-18 | End: 2023-12-27 | Stop reason: SDUPTHER

## 2023-12-27 RX ORDER — DIVALPROEX SODIUM 250 MG/1
250 TABLET, EXTENDED RELEASE ORAL
Qty: 30 TABLET | Refills: 3 | Status: SHIPPED | OUTPATIENT
Start: 2023-12-27 | End: 2024-01-26

## 2023-12-27 RX ORDER — BUPROPION HYDROCHLORIDE 150 MG/1
TABLET, EXTENDED RELEASE ORAL
COMMUNITY
Start: 2023-12-18 | End: 2023-12-27 | Stop reason: SDUPTHER

## 2023-12-27 RX ORDER — DIVALPROEX SODIUM 500 MG/1
1000 TABLET, EXTENDED RELEASE ORAL NIGHTLY
Qty: 60 TABLET | Refills: 3 | Status: SHIPPED | OUTPATIENT
Start: 2023-12-27 | End: 2024-01-26

## 2023-12-27 RX ORDER — BUSPIRONE HYDROCHLORIDE 5 MG/1
TABLET ORAL
COMMUNITY
Start: 2023-12-18 | End: 2023-12-27 | Stop reason: SDUPTHER

## 2023-12-27 RX ORDER — BUSPIRONE HYDROCHLORIDE 10 MG/1
10 TABLET ORAL 2 TIMES DAILY
Qty: 60 TABLET | Refills: 3 | Status: SHIPPED | OUTPATIENT
Start: 2023-12-27 | End: 2024-01-26

## 2023-12-27 RX ORDER — BUPROPION HYDROCHLORIDE 150 MG/1
150 TABLET, EXTENDED RELEASE ORAL EVERY MORNING
Qty: 30 TABLET | Refills: 3 | Status: SHIPPED | OUTPATIENT
Start: 2023-12-27 | End: 2024-01-26

## 2023-12-27 NOTE — PROGRESS NOTES
OUTPATIENT PSYCHIATRIC RETURN VISIT PROGRESS NOTE    I was in the office While conducting this encounter. She and/ or her healthcare decision maker is aware that this patient-initialed Telehealth encounter is a billable service with coverage as determined by her insurance carrier. She is aware that she may receive a bill and has provided verbal consent to proceed: Yes    The Virtual visit was conducted via [address] [place] [address]. Pursuant to the emergency declaration under the [place] [address] [facility] authority and the [employer] Act, this Virtual  Visit was conducted to reduce the patient's risk of exposure to COVID-19 and provide continuity of care for an established patient. Services were provided through a video synchronous discussion virtually to substitute for in-person clinic visit. Due to this being a TeleHealth evaluation, many elements of the physical examination are unable to be assessed. Total Time:  minutes: 40-54 minutes         Date of Service: 12/27/2023     Identification    Bharti Concepcion is a 46 y.o.  with a past psychiatric history of bipolar d/o, anxiety, insomnia  who presents today for a psychiatric follow up appointment. CC:  Routine medication management follow up. Chief Complaint   Patient presents with    Follow-up        Subjective / Interval History:    Pt visit via My Chart  today and reports that she went to ED on 12/10/23 with SI. ED notes indicate she made 2 suicide attempts there.  told her that  she tried to grab a nurse's scissors , tried to choke herself with the string on her hoodie. Placed on involuntary hold . Transferred to [facility] in [place], [place]. [facility] 12/18/23. Pt says hospitalization was a \"terrible experience\". Says staff treated patients \"like crap\". Says \"they need to be reported\".   Says her daughter who is incarcerated was repeatedly calling prior to pt going to

## 2024-01-04 ENCOUNTER — OFFICE VISIT (OUTPATIENT)
Dept: FAMILY MEDICINE CLINIC | Facility: CLINIC | Age: 49
End: 2024-01-04
Payer: MEDICARE

## 2024-01-04 VITALS
TEMPERATURE: 98.2 F | OXYGEN SATURATION: 97 % | BODY MASS INDEX: 30.67 KG/M2 | DIASTOLIC BLOOD PRESSURE: 72 MMHG | HEART RATE: 73 BPM | WEIGHT: 190 LBS | SYSTOLIC BLOOD PRESSURE: 112 MMHG

## 2024-01-04 DIAGNOSIS — E78.2 MIXED HYPERLIPIDEMIA: ICD-10-CM

## 2024-01-04 DIAGNOSIS — J30.9 ALLERGIC RHINITIS, UNSPECIFIED SEASONALITY, UNSPECIFIED TRIGGER: ICD-10-CM

## 2024-01-04 DIAGNOSIS — M54.50 CHRONIC MIDLINE LOW BACK PAIN WITHOUT SCIATICA: ICD-10-CM

## 2024-01-04 DIAGNOSIS — Z00.00 MEDICARE ANNUAL WELLNESS VISIT, SUBSEQUENT: ICD-10-CM

## 2024-01-04 DIAGNOSIS — Z12.31 BREAST CANCER SCREENING BY MAMMOGRAM: ICD-10-CM

## 2024-01-04 DIAGNOSIS — K76.0 FATTY LIVER: ICD-10-CM

## 2024-01-04 DIAGNOSIS — M46.1 SACROILIITIS, NOT ELSEWHERE CLASSIFIED (HCC): ICD-10-CM

## 2024-01-04 DIAGNOSIS — I10 ESSENTIAL HYPERTENSION: ICD-10-CM

## 2024-01-04 DIAGNOSIS — D69.6 THROMBOCYTOPENIA (HCC): ICD-10-CM

## 2024-01-04 DIAGNOSIS — G89.29 CHRONIC MIDLINE LOW BACK PAIN WITHOUT SCIATICA: ICD-10-CM

## 2024-01-04 DIAGNOSIS — E66.09 CLASS 1 OBESITY DUE TO EXCESS CALORIES WITH SERIOUS COMORBIDITY AND BODY MASS INDEX (BMI) OF 30.0 TO 30.9 IN ADULT: ICD-10-CM

## 2024-01-04 DIAGNOSIS — M79.2 NEUROPATHIC PAIN: ICD-10-CM

## 2024-01-04 DIAGNOSIS — R71.8 ELEVATED MCV: ICD-10-CM

## 2024-01-04 DIAGNOSIS — E11.51 TYPE 2 DIABETES MELLITUS WITH DIABETIC PERIPHERAL ANGIOPATHY WITHOUT GANGRENE, WITHOUT LONG-TERM CURRENT USE OF INSULIN (HCC): Primary | ICD-10-CM

## 2024-01-04 DIAGNOSIS — E11.51 TYPE 2 DIABETES MELLITUS WITH DIABETIC PERIPHERAL ANGIOPATHY WITHOUT GANGRENE, WITHOUT LONG-TERM CURRENT USE OF INSULIN (HCC): ICD-10-CM

## 2024-01-04 DIAGNOSIS — K21.9 GASTROESOPHAGEAL REFLUX DISEASE WITHOUT ESOPHAGITIS: ICD-10-CM

## 2024-01-04 DIAGNOSIS — N95.1 VASOMOTOR SYMPTOMS DUE TO MENOPAUSE: ICD-10-CM

## 2024-01-04 LAB
BASOPHILS # BLD: 0.1 K/UL (ref 0–0.2)
BASOPHILS NFR BLD: 1 % (ref 0–2)
DIFFERENTIAL METHOD BLD: ABNORMAL
EOSINOPHIL # BLD: 0 K/UL (ref 0–0.8)
EOSINOPHIL NFR BLD: 1 % (ref 0.5–7.8)
ERYTHROCYTE [DISTWIDTH] IN BLOOD BY AUTOMATED COUNT: 12 % (ref 11.9–14.6)
HBA1C MFR BLD: 5.5 %
HCT VFR BLD AUTO: 42.3 % (ref 35.8–46.3)
HGB BLD-MCNC: 14.5 G/DL (ref 11.7–15.4)
IMM GRANULOCYTES # BLD AUTO: 0 K/UL (ref 0–0.5)
IMM GRANULOCYTES NFR BLD AUTO: 0 % (ref 0–5)
LYMPHOCYTES # BLD: 1.6 K/UL (ref 0.5–4.6)
LYMPHOCYTES NFR BLD: 24 % (ref 13–44)
MCH RBC QN AUTO: 35.1 PG (ref 26.1–32.9)
MCHC RBC AUTO-ENTMCNC: 34.3 G/DL (ref 31.4–35)
MCV RBC AUTO: 102.4 FL (ref 82–102)
MONOCYTES # BLD: 0.4 K/UL (ref 0.1–1.3)
MONOCYTES NFR BLD: 5 % (ref 4–12)
NEUTS SEG # BLD: 4.9 K/UL (ref 1.7–8.2)
NEUTS SEG NFR BLD: 69 % (ref 43–78)
NRBC # BLD: 0 K/UL (ref 0–0.2)
PLATELET # BLD AUTO: 102 K/UL (ref 150–450)
PMV BLD AUTO: 12.5 FL (ref 9.4–12.3)
RBC # BLD AUTO: 4.13 M/UL (ref 4.05–5.2)
WBC # BLD AUTO: 7 K/UL (ref 4.3–11.1)

## 2024-01-04 PROCEDURE — G8484 FLU IMMUNIZE NO ADMIN: HCPCS | Performed by: NURSE PRACTITIONER

## 2024-01-04 PROCEDURE — 3017F COLORECTAL CA SCREEN DOC REV: CPT | Performed by: NURSE PRACTITIONER

## 2024-01-04 PROCEDURE — G8427 DOCREV CUR MEDS BY ELIG CLIN: HCPCS | Performed by: NURSE PRACTITIONER

## 2024-01-04 PROCEDURE — 2022F DILAT RTA XM EVC RTNOPTHY: CPT | Performed by: NURSE PRACTITIONER

## 2024-01-04 PROCEDURE — 3078F DIAST BP <80 MM HG: CPT | Performed by: NURSE PRACTITIONER

## 2024-01-04 PROCEDURE — 99214 OFFICE O/P EST MOD 30 MIN: CPT | Performed by: NURSE PRACTITIONER

## 2024-01-04 PROCEDURE — 3074F SYST BP LT 130 MM HG: CPT | Performed by: NURSE PRACTITIONER

## 2024-01-04 PROCEDURE — 1036F TOBACCO NON-USER: CPT | Performed by: NURSE PRACTITIONER

## 2024-01-04 PROCEDURE — 83036 HEMOGLOBIN GLYCOSYLATED A1C: CPT | Performed by: NURSE PRACTITIONER

## 2024-01-04 PROCEDURE — G0439 PPPS, SUBSEQ VISIT: HCPCS | Performed by: NURSE PRACTITIONER

## 2024-01-04 PROCEDURE — G8417 CALC BMI ABV UP PARAM F/U: HCPCS | Performed by: NURSE PRACTITIONER

## 2024-01-04 PROCEDURE — 3046F HEMOGLOBIN A1C LEVEL >9.0%: CPT | Performed by: NURSE PRACTITIONER

## 2024-01-04 RX ORDER — CLONIDINE HYDROCHLORIDE 0.1 MG/1
0.1 TABLET ORAL 2 TIMES DAILY
Qty: 60 TABLET | Refills: 8 | Status: SHIPPED | OUTPATIENT
Start: 2024-01-04

## 2024-01-04 RX ORDER — FLUTICASONE PROPIONATE 50 MCG
2 SPRAY, SUSPENSION (ML) NASAL DAILY
Qty: 16 G | Refills: 11 | Status: SHIPPED | OUTPATIENT
Start: 2024-01-04

## 2024-01-04 RX ORDER — ATORVASTATIN CALCIUM 10 MG/1
10 TABLET, FILM COATED ORAL NIGHTLY
Qty: 90 TABLET | Refills: 3 | Status: SHIPPED | OUTPATIENT
Start: 2024-01-04

## 2024-01-04 RX ORDER — GABAPENTIN 300 MG/1
600 CAPSULE ORAL 3 TIMES DAILY
Qty: 180 CAPSULE | Refills: 5 | Status: SHIPPED | OUTPATIENT
Start: 2024-01-04 | End: 2024-07-02

## 2024-01-04 RX ORDER — OMEPRAZOLE 40 MG/1
40 CAPSULE, DELAYED RELEASE ORAL DAILY
Qty: 90 CAPSULE | Refills: 2 | Status: SHIPPED | OUTPATIENT
Start: 2024-01-04

## 2024-01-04 RX ORDER — LISINOPRIL 20 MG/1
20 TABLET ORAL DAILY
Qty: 90 TABLET | Refills: 1 | Status: SHIPPED | OUTPATIENT
Start: 2024-01-04

## 2024-01-04 ASSESSMENT — PATIENT HEALTH QUESTIONNAIRE - PHQ9
5. POOR APPETITE OR OVEREATING: 2
SUM OF ALL RESPONSES TO PHQ QUESTIONS 1-9: 5
SUM OF ALL RESPONSES TO PHQ9 QUESTIONS 1 & 2: 0
10. IF YOU CHECKED OFF ANY PROBLEMS, HOW DIFFICULT HAVE THESE PROBLEMS MADE IT FOR YOU TO DO YOUR WORK, TAKE CARE OF THINGS AT HOME, OR GET ALONG WITH OTHER PEOPLE: 1
9. THOUGHTS THAT YOU WOULD BE BETTER OFF DEAD, OR OF HURTING YOURSELF: 0
6. FEELING BAD ABOUT YOURSELF - OR THAT YOU ARE A FAILURE OR HAVE LET YOURSELF OR YOUR FAMILY DOWN: 0
2. FEELING DOWN, DEPRESSED OR HOPELESS: 0
3. TROUBLE FALLING OR STAYING ASLEEP: 3
8. MOVING OR SPEAKING SO SLOWLY THAT OTHER PEOPLE COULD HAVE NOTICED. OR THE OPPOSITE, BEING SO FIGETY OR RESTLESS THAT YOU HAVE BEEN MOVING AROUND A LOT MORE THAN USUAL: 0
SUM OF ALL RESPONSES TO PHQ QUESTIONS 1-9: 5
7. TROUBLE CONCENTRATING ON THINGS, SUCH AS READING THE NEWSPAPER OR WATCHING TELEVISION: 0
1. LITTLE INTEREST OR PLEASURE IN DOING THINGS: 0
4. FEELING TIRED OR HAVING LITTLE ENERGY: 0
SUM OF ALL RESPONSES TO PHQ QUESTIONS 1-9: 5
SUM OF ALL RESPONSES TO PHQ QUESTIONS 1-9: 5

## 2024-01-04 ASSESSMENT — ENCOUNTER SYMPTOMS
EYES NEGATIVE: 1
BACK PAIN: 1
RESPIRATORY NEGATIVE: 1

## 2024-01-04 NOTE — PROGRESS NOTES
Medicare Annual Wellness Visit    Alyssa Lorenzana is here for Medicare AWV (PT presents today for an AWV. Pt needs refills.)    Assessment & Plan   Type 2 diabetes mellitus with diabetic peripheral angiopathy without gangrene, without long-term current use of insulin (HCC)  -     AMB POC HEMOGLOBIN A1C  -     Comprehensive Metabolic Panel; Future  -     TSH with Reflex; Future  -     Semaglutide-Weight Management (WEGOVY) 0.25 MG/0.5ML SOAJ SC injection; Inject 0.25 mg into the skin every 7 days, Disp-2 mL, R-0Normal  Mixed hyperlipidemia  -     Comprehensive Metabolic Panel; Future  -     Lipid Panel; Future  Fatty liver  -     Comprehensive Metabolic Panel; Future  Essential hypertension  -     Comprehensive Metabolic Panel; Future  -     CBC with Auto Differential; Future  -     lisinopril (PRINIVIL;ZESTRIL) 20 MG tablet; Take 1 tablet by mouth daily, Disp-90 tablet, R-1Normal  Sacroiliitis, not elsewhere classified (HCC)  Thrombocytopenia (HCC)  -     CBC with Auto Differential; Future  Medicare annual wellness visit, subsequent  Breast cancer screening by mammogram  -     JUN DIGITAL SCREEN W OR WO CAD BILATERAL; Future  Class 1 obesity due to excess calories with serious comorbidity and body mass index (BMI) of 30.0 to 30.9 in adult  -     Semaglutide-Weight Management (WEGOVY) 0.25 MG/0.5ML SOAJ SC injection; Inject 0.25 mg into the skin every 7 days, Disp-2 mL, R-0Normal  Vasomotor symptoms due to menopause  -     cloNIDine (CATAPRES) 0.1 MG tablet; Take 1 tablet by mouth 2 times daily, Disp-60 tablet, R-8Normal  Allergic rhinitis, unspecified seasonality, unspecified trigger  -     fluticasone (FLONASE) 50 MCG/ACT nasal spray; 2 sprays by Each Nostril route daily, Disp-16 g, R-11Normal  Gastroesophageal reflux disease without esophagitis  -     omeprazole (PRILOSEC) 40 MG delayed release capsule; Take 1 capsule by mouth daily, Disp-90 capsule, R-2Normal  Chronic midline low back pain without 
injection; Inject 0.25 mg into the skin every 7 days, Disp-2 mL, R-0Normal  2. Mixed hyperlipidemia  -     Comprehensive Metabolic Panel; Future  -     Lipid Panel; Future  3. Fatty liver  -     Comprehensive Metabolic Panel; Future  4. Essential hypertension  -     Comprehensive Metabolic Panel; Future  -     CBC with Auto Differential; Future  -     lisinopril (PRINIVIL;ZESTRIL) 20 MG tablet; Take 1 tablet by mouth daily, Disp-90 tablet, R-1Normal  5. Sacroiliitis, not elsewhere classified (HCC)  6. Thrombocytopenia (HCC)  -     CBC with Auto Differential; Future  7. Medicare annual wellness visit, subsequent  8. Breast cancer screening by mammogram  -     [facility] DIGITAL SCREEN W OR WO CAD BILATERAL; Future  9. Class 1 obesity due to excess calories with serious comorbidity and body mass index (BMI) of 30.0 to 30.9 in adult  -     Semaglutide-Weight Management (WEGOVY) 0.25 MG/0.5ML SOAJ SC injection; Inject 0.25 mg into the skin every 7 days, Disp-2 mL, R-0Normal  10. Vasomotor symptoms due to menopause  -     cloNIDine (CATAPRES) 0.1 MG tablet; Take 1 tablet by mouth 2 times daily, Disp-60 tablet, R-8Normal  11. Allergic rhinitis, unspecified seasonality, unspecified trigger  -     fluticasone (FLONASE) 50 MCG/ACT nasal spray; 2 sprays by Each Nostril route daily, Disp-16 g, R-11Normal  12. Gastroesophageal reflux disease without esophagitis  -     omeprazole (PRILOSEC) 40 MG delayed release capsule; Take 1 capsule by mouth daily, Disp-90 capsule, R-2Normal  13. Chronic midline low back pain without sciatica  -     gabapentin (NEURONTIN) 300 MG capsule; Take 2 capsules by mouth 3 times daily for 180 days., Disp-180 capsule, R-5Normal  14. Neuropathic pain  -     gabapentin (NEURONTIN) 300 MG capsule; Take 2 capsules by mouth 3 times daily for 180 days., Disp-180 capsule, R-5Normal  BP well controlled.  A1c 5.5 diabetes well controlled on no meds.  Pt with weight gain; recommended consider increasing activity;

## 2024-01-04 NOTE — PATIENT INSTRUCTIONS
Learning About Mindfulness for Stress  What are mindfulness and stress?     Stress is your body's response to a hard situation. Your body can have a physical, emotional, or mental response. A lot of things can cause stress. You may feel stress when you go on a job interview, take a test, or run a race. This kind of short-term stress is normal and even useful. It can help you if you need to work hard or react quickly.  Stress also can last a long time. Long-term stress is caused by stressful situations or events. Examples of long-term stress include long-term health problems, ongoing problems at work, and conflicts in your family. Long-term stress can harm your health.  Mindfulness is a focus only on things happening in the present moment. It's a process of purposefully paying attention to and being aware of your surroundings, your emotions, your thoughts, and how your body feels. You are aware of these things, but you aren't judging these experiences as \"good\" or \"bad.\" Mindfulness can help you learn to calm your mind and body to help you cope with illness, pain, and stress.  How does mindfulness help to relieve stress?  Mindfulness can help quiet your mind and relax your body. Studies show that it can help some people sleep better, feel less anxious, and bring their blood pressure down. And it's been shown to help some people live and cope better with certain health problems like heart disease, depression, chronic pain, and cancer.  How do you practice mindfulness?  To be mindful is to pay attention, to be present, and to be accepting. Like any new skill or habit, being mindful can take practice.  When you're mindful, you do just one thing and you pay close attention to that one thing. For example, you may sit quietly and notice your emotions or how your food tastes and smells.  When you're present, you focus on the things that are happening right now. You let go of your thoughts about the past and the future.

## 2024-01-05 ENCOUNTER — TELEPHONE (OUTPATIENT)
Dept: FAMILY MEDICINE CLINIC | Facility: CLINIC | Age: 49
End: 2024-01-05

## 2024-01-05 DIAGNOSIS — R71.8 ELEVATED MCV: ICD-10-CM

## 2024-01-05 LAB
ALBUMIN SERPL-MCNC: 3.4 G/DL (ref 3.5–5)
ALBUMIN/GLOB SERPL: 1.1 (ref 0.4–1.6)
ALP SERPL-CCNC: 163 U/L (ref 50–136)
ALT SERPL-CCNC: 13 U/L (ref 12–65)
ANION GAP SERPL CALC-SCNC: 7 MMOL/L (ref 2–11)
AST SERPL-CCNC: 9 U/L (ref 15–37)
BILIRUB SERPL-MCNC: 0.2 MG/DL (ref 0.2–1.1)
BUN SERPL-MCNC: 17 MG/DL (ref 6–23)
CALCIUM SERPL-MCNC: 9.7 MG/DL (ref 8.3–10.4)
CHLORIDE SERPL-SCNC: 104 MMOL/L (ref 103–113)
CHOLEST SERPL-MCNC: 175 MG/DL
CO2 SERPL-SCNC: 28 MMOL/L (ref 21–32)
CREAT SERPL-MCNC: 0.9 MG/DL (ref 0.6–1)
GLOBULIN SER CALC-MCNC: 3.2 G/DL (ref 2.8–4.5)
GLUCOSE SERPL-MCNC: 116 MG/DL (ref 65–100)
HDLC SERPL-MCNC: 56 MG/DL (ref 40–60)
HDLC SERPL: 3.1
LDLC SERPL CALC-MCNC: 61.4 MG/DL
POTASSIUM SERPL-SCNC: 3.9 MMOL/L (ref 3.5–5.1)
PROT SERPL-MCNC: 6.6 G/DL (ref 6.3–8.2)
SODIUM SERPL-SCNC: 139 MMOL/L (ref 136–146)
TRIGL SERPL-MCNC: 288 MG/DL (ref 35–150)
TSH W FREE THYROID IF ABNORMAL: 1.41 UIU/ML (ref 0.36–3.74)
VIT B12 SERPL-MCNC: 440 PG/ML (ref 193–986)
VLDLC SERPL CALC-MCNC: 57.6 MG/DL (ref 6–23)

## 2024-01-05 NOTE — TELEPHONE ENCOUNTER
Pt called and states the injection that Le rx'd, Wegovy, is not covered by ins, wants to know if there's anything else she can take.  Please call pt at [phone], thank you!

## 2024-01-08 DIAGNOSIS — E11.51 TYPE 2 DIABETES MELLITUS WITH DIABETIC PERIPHERAL ANGIOPATHY WITHOUT GANGRENE, WITHOUT LONG-TERM CURRENT USE OF INSULIN (HCC): ICD-10-CM

## 2024-01-08 DIAGNOSIS — E66.09 CLASS 1 OBESITY DUE TO EXCESS CALORIES WITH SERIOUS COMORBIDITY AND BODY MASS INDEX (BMI) OF 30.0 TO 30.9 IN ADULT: ICD-10-CM

## 2024-01-08 NOTE — TELEPHONE ENCOUNTER
Patient states she does not a PA for the Wegovy. But needs it sent to [address], in [place]. Thank you

## 2024-01-09 ENCOUNTER — TELEPHONE (OUTPATIENT)
Dept: FAMILY MEDICINE CLINIC | Facility: CLINIC | Age: 49
End: 2024-01-09

## 2024-01-09 DIAGNOSIS — E66.09 CLASS 1 OBESITY DUE TO EXCESS CALORIES WITH SERIOUS COMORBIDITY AND BODY MASS INDEX (BMI) OF 30.0 TO 30.9 IN ADULT: ICD-10-CM

## 2024-01-09 DIAGNOSIS — E11.51 TYPE 2 DIABETES MELLITUS WITH DIABETIC PERIPHERAL ANGIOPATHY WITHOUT GANGRENE, WITHOUT LONG-TERM CURRENT USE OF INSULIN (HCC): ICD-10-CM

## 2024-01-09 NOTE — RESULT ENCOUNTER NOTE
Labs look good.  Please let me know if pt has any additional questions.  Keep scheduled appointment.

## 2024-01-09 NOTE — RESULT ENCOUNTER NOTE
Cholesterol stable.  Triglycerides and glucose up, avoid starchy foods and high sugar foods.  Alk phos liver enzyme is elevated.  Platelets (chronic) low but stable.  We will recheck her liver function at next visit.    Please let me know if patient has any further questions or concerns.

## 2024-01-09 NOTE — TELEPHONE ENCOUNTER
Pt called stating that [facility] does not have her weight loss shot, but [facility] on [address] will be able to fill the rx.  Please call pt with any questions at [phone], thank you!

## 2024-01-11 ENCOUNTER — TELEPHONE (OUTPATIENT)
Dept: FAMILY MEDICINE CLINIC | Facility: CLINIC | Age: 49
End: 2024-01-11

## 2024-01-11 NOTE — TELEPHONE ENCOUNTER
Pt called to let Ольга know that Trazadone is still not working, please call pt back at [phone], thank you!    Walk in

## 2024-01-12 DIAGNOSIS — F51.04 INSOMNIA, PSYCHOPHYSIOLOGICAL: ICD-10-CM

## 2024-01-12 RX ORDER — TRAZODONE HYDROCHLORIDE 100 MG/1
200 TABLET ORAL NIGHTLY
Qty: 60 TABLET | Refills: 3 | Status: SHIPPED | OUTPATIENT
Start: 2024-01-12 | End: 2024-05-11

## 2024-01-25 ENCOUNTER — APPOINTMENT (OUTPATIENT)
Dept: GENERAL RADIOLOGY | Age: 49
End: 2024-01-25
Payer: MEDICARE

## 2024-01-25 ENCOUNTER — HOSPITAL ENCOUNTER (EMERGENCY)
Age: 49
Discharge: HOME | End: 2024-01-25
Payer: MEDICARE

## 2024-01-25 VITALS
OXYGEN SATURATION: 100 % | SYSTOLIC BLOOD PRESSURE: 99 MMHG | TEMPERATURE: 97.5 F | HEART RATE: 89 BPM | BODY MASS INDEX: 29.82 KG/M2 | RESPIRATION RATE: 14 BRPM | HEIGHT: 67 IN | DIASTOLIC BLOOD PRESSURE: 78 MMHG | WEIGHT: 190 LBS
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
## 2024-01-25 DIAGNOSIS — M54.50 ACUTE BILATERAL LOW BACK PAIN, UNSPECIFIED WHETHER SCIATICA PRESENT: ICD-10-CM

## 2024-01-25 PROCEDURE — 6360000002 HC RX W HCPCS: Performed by: NURSE PRACTITIONER

## 2024-01-25 PROCEDURE — 6370000000 HC RX 637 (ALT 250 FOR IP): Performed by: NURSE PRACTITIONER

## 2024-01-25 PROCEDURE — 72100 X-RAY EXAM L-S SPINE 2/3 VWS: CPT

## 2024-01-25 PROCEDURE — 96372 THER/PROPH/DIAG INJ SC/IM: CPT

## 2024-01-25 PROCEDURE — 99284 EMERGENCY DEPT VISIT MOD MDM: CPT

## 2024-01-25 RX ORDER — OXYCODONE HYDROCHLORIDE 5 MG/1
5 TABLET ORAL
Status: COMPLETED | OUTPATIENT
Start: 2024-01-25 | End: 2024-01-25

## 2024-01-25 RX ORDER — KETOROLAC TROMETHAMINE 15 MG/ML
15 INJECTION, SOLUTION INTRAMUSCULAR; INTRAVENOUS ONCE
Status: COMPLETED | OUTPATIENT
Start: 2024-01-25 | End: 2024-01-25

## 2024-01-25 RX ORDER — NALOXONE HYDROCHLORIDE 4 MG/.1ML
1 SPRAY NASAL PRN
Qty: 1 EACH | Refills: 0 | Status: SHIPPED | OUTPATIENT
Start: 2024-01-25

## 2024-01-25 RX ORDER — OXYCODONE HYDROCHLORIDE 5 MG/1
5 TABLET ORAL EVERY 6 HOURS PRN
Qty: 12 TABLET | Refills: 0 | Status: SHIPPED | OUTPATIENT
Start: 2024-01-25 | End: 2024-01-28

## 2024-01-25 RX ORDER — METHYLPREDNISOLONE 4 MG/1
TABLET ORAL
Qty: 1 KIT | Refills: 0 | Status: SHIPPED | OUTPATIENT
Start: 2024-01-25 | End: 2024-01-31

## 2024-01-25 RX ORDER — LIDOCAINE 4 G/G
1 PATCH TOPICAL
Status: DISCONTINUED | OUTPATIENT
Start: 2024-01-25 | End: 2024-01-25 | Stop reason: HOSPADM

## 2024-01-25 RX ORDER — NAPROXEN 500 MG/1
500 TABLET ORAL 2 TIMES DAILY WITH MEALS
Qty: 30 TABLET | Refills: 0 | Status: SHIPPED | OUTPATIENT
Start: 2024-01-25

## 2024-01-25 RX ORDER — METHOCARBAMOL 750 MG/1
750 TABLET, FILM COATED ORAL 4 TIMES DAILY
Qty: 40 TABLET | Refills: 0 | Status: SHIPPED | OUTPATIENT
Start: 2024-01-25 | End: 2024-02-04

## 2024-01-25 RX ADMIN — OXYCODONE 5 MG: 5 TABLET ORAL at 12:20

## 2024-01-25 RX ADMIN — KETOROLAC TROMETHAMINE 15 MG: 15 INJECTION, SOLUTION INTRAMUSCULAR; INTRAVENOUS at 12:19

## 2024-01-25 ASSESSMENT — PAIN SCALES - GENERAL
PAINLEVEL_OUTOF10: 7
PAINLEVEL_OUTOF10: 10

## 2024-01-25 ASSESSMENT — PAIN DESCRIPTION - DESCRIPTORS: DESCRIPTORS: ACHING

## 2024-01-25 ASSESSMENT — PAIN DESCRIPTION - LOCATION: LOCATION: BACK

## 2024-01-25 ASSESSMENT — PAIN - FUNCTIONAL ASSESSMENT: PAIN_FUNCTIONAL_ASSESSMENT: 0-10

## 2024-01-25 NOTE — DISCHARGE INSTRUCTIONS
Your x-ray is normal.  Your symptoms are most consistent with sciatic pain.  Take medication as prescribed.  Perform gentle stretching of your lower back.  Apply ice for 10 or 15 minutes at a time every 3-4 hours.  Follow-up closely with your primary care provider.  Discussed referral to physical therapy.  Return to the emergency department for any new, worsening, or concerning symptoms.

## 2024-01-25 NOTE — DISCHARGE INSTR - COC
Continuity of Care Form    Patient Name: Alyssa Lorenzana   :  1975  MRN:  [id number]    Admit date:  2024  Discharge date:  ***    Code Status Order: Prior   Advance Directives:     Admitting Physician:  No admitting provider for patient encounter.  PCP: Carlee Medrano, APRN - CNP    Discharging Nurse: ***  Discharging Hospital Unit/Room#: D03/D03  Discharging Unit Phone Number: ***    Emergency Contact:   Extended Emergency Contact Information  Primary Emergency Contact: Toy Lorenzana  Address: [address] [place] [id number] [facility]  Home Phone: [phone]  Mobile Phone: [phone]  Relation: Spouse  Preferred language: English   needed? No    Past Surgical History:  Past Surgical History:   Procedure Laterality Date    COLONOSCOPY N/A 2016    COLONOSCOPY  BMI 30 ROOM 603 performed by Carlos Leonard MD at [facility] ENDOSCOPY    CYSTOSCOPY  2011    bladder biopsy    CYSTOSCOPY N/A 2023    CYSTOSCOPY URETHRAL DILATATION performed by Nick Pickett Jr., MD at [facility] MAIN OR    LUMBAR SPINE SURGERY  2022    L5-S1; TLIF    PARTIAL HYSTERECTOMY (CERVIX NOT REMOVED)         Immunization History:   Immunization History   Administered Date(s) Administered    Influenza Virus Vaccine 2018, 10/07/2019    Influenza, FLUARIX, FLULAVAL, FLUZONE (age 6 mo+) AND AFLURIA, (age 3 y+), PF, 0.5mL 2018, 10/07/2019    TD 2LF, TDVAX, (age 7y+), IM, 0.5mL 2023    TDaP, ADACEL (age 10y-64y), BOOSTRIX (age 10y+), IM, 0.5mL 2021       Active Problems:  Patient Active Problem List   Diagnosis Code    Elevated LDL cholesterol level E78.00    JUS (generalized anxiety disorder) F41.1    Bicuspid aortic valve Q23.1    Duodenal ulcer K26.9    Gastritis without bleeding K29.70    Essential hypertension I10    Bipolar 1 disorder, depressed, partial remission (HCC) F31.75    Ascending aortic aneurysm (HCC) I71.21    Insomnia,

## 2024-01-25 NOTE — ED NOTES
I have reviewed discharge instructions with the patient.  The patient verbalized understanding.    Patient left ED via Discharge Method: ambulatory to Home with family.    Opportunity for questions and clarification provided.       Patient given 5 scripts.         To continue your aftercare when you leave the hospital, you may receive an automated call from our care team to check in on how you are doing.  This is a free service and part of our promise to provide the best care and service to meet your aftercare needs.” If you have questions, or wish to unsubscribe from this service please call [phone].  Thank you for Choosing our [facility] Emergency Department.        Karyna Arechiga LPN  01/25/24 4764

## 2024-01-25 NOTE — ED TRIAGE NOTES
Pt arrives via [facility] from home with CO fall x 2. Hx back sx. Denies numbness tingling or loss of bowel or bladder.

## 2024-01-25 NOTE — ED PROVIDER NOTES
Emergency Department Provider Note       PCP: Carlee Medrano, APRN - CNP   Age: 48 y.o.   Sex: female     DISPOSITION       No diagnosis found.    Medical Decision Making     Complexity of Problems Addressed:  {Complexity:81489}    Data Reviewed and Analyzed:   I independently ordered and reviewed each unique test.  {external source:03020}   {Historian (state who, why needed, what they said):92592}    I independently ordered and interpreted the ED EKG in the absence of a Cardiologist.    Rate: ***  EKG Interpretation: {EKG Interpretation:64958:::1}  ST Segments: {ST Segments:97287}      {test reviewed:77228}    Discussion of management or test interpretation.  ***       {Admitted or Consultants involved.:72288}    Risk of Complications and/or Morbidity of Patient Management:  {Risk:55267}    Is this patient to be included in the SEP-1 core measure due to severe sepsis or septic shock? {SEP1 yes/no:54817}     History      48-year-old female presents to the emergency department today with complaint of lower back pain. She reports two falls due to pain.     The history is provided by the patient.   Back Pain  Location:  Lumbar spine       Review of Systems   Musculoskeletal:  Positive for back pain.       Physical Exam     Vitals signs and nursing note reviewed:  Vitals:    01/25/24 1149   BP: 106/80   Pulse: 87   Resp: 14   Temp: 98.3 °F (36.8 °C)   TempSrc: Oral   SpO2: 95%   Height: 1.702 m (5' 7\")      Physical Exam     Procedures     Procedures    No orders of the defined types were placed in this encounter.       Medications given during this emergency department visit:  Medications - No data to display    New Prescriptions    No medications on file        Past Medical History:   Diagnosis Date   • Aneurysm (HCC)     Aorta aneurysm measured 4.8/4.7cm on 10/06/2021, monitoring at present time   • Bicuspid aortic valve    • CAD (coronary artery disease)     Echo on 11/21/2018 showed  EF 50-55%   • Chronic pain 
the wheelchair but reports pain in her lower back when standing.  Neurovascularly intact bilateral lower extremities.   Skin:     General: Skin is warm and dry.      Capillary Refill: Capillary refill takes less than 2 seconds.   Neurological:      General: No focal deficit present.      Mental Status: She is alert and oriented to person, place, and time.      Deep Tendon Reflexes:      Reflex Scores:       Patellar reflexes are 2+ on the right side and 2+ on the left side.         Procedures     Procedures     Orders Placed This Encounter   Procedures    XR LUMBAR SPINE (2-3 VIEWS)        Medications given during this emergency department visit:  Medications   oxyCODONE (ROXICODONE) immediate release tablet 5 mg (5 mg Oral Given 1/25/24 1220)   ketorolac (TORADOL) injection 15 mg (15 mg IntraMUSCular Given 1/25/24 1219)       Discharge Medication List as of 1/25/2024  1:31 PM        START taking these medications    Details   methylPREDNISolone (MEDROL, JASPER,) 4 MG tablet Take by mouth., Disp-1 kit, R-0Print      naproxen (NAPROSYN) 500 MG tablet Take 1 tablet by mouth 2 times daily (with meals), Disp-30 tablet, R-0Print      oxyCODONE (ROXICODONE) 5 MG immediate release tablet Take 1 tablet by mouth every 6 hours as needed for Pain for up to 3 days. Intended supply: 3 days. Take lowest dose possible to manage pain Max Daily Amount: 20 mg, Disp-12 tablet, R-0Print      naloxone (NARCAN) 4 MG/0.1ML LIQD nasal spray 1 spray by Nasal route as needed for Opioid Reversal, Disp-1 each, R-0Print      methocarbamol (ROBAXIN-750) 750 MG tablet Take 1 tablet by mouth 4 times daily for 10 days, Disp-40 tablet, R-0Print              Past Medical History:   Diagnosis Date    Aneurysm (HCC)     Aorta aneurysm measured 4.8/4.7cm on 10/06/2021, monitoring at present time    Bicuspid aortic valve     CAD (coronary artery disease)     Echo on 11/21/2018 showed  EF 50-55%    Chronic pain 03/14/2016    Cigarette smoker 11/22/2016

## 2024-01-31 ENCOUNTER — TELEMEDICINE (OUTPATIENT)
Dept: BEHAVIORAL/MENTAL HEALTH CLINIC | Age: 49
End: 2024-01-31
Payer: MEDICARE

## 2024-01-31 DIAGNOSIS — F31.75 BIPOLAR 1 DISORDER, DEPRESSED, PARTIAL REMISSION (HCC): Primary | ICD-10-CM

## 2024-01-31 DIAGNOSIS — F51.04 INSOMNIA, PSYCHOPHYSIOLOGICAL: ICD-10-CM

## 2024-01-31 DIAGNOSIS — F41.1 GAD (GENERALIZED ANXIETY DISORDER): ICD-10-CM

## 2024-01-31 PROCEDURE — 99215 OFFICE O/P EST HI 40 MIN: CPT | Performed by: NURSE PRACTITIONER

## 2024-01-31 PROCEDURE — 3017F COLORECTAL CA SCREEN DOC REV: CPT | Performed by: NURSE PRACTITIONER

## 2024-01-31 PROCEDURE — G8427 DOCREV CUR MEDS BY ELIG CLIN: HCPCS | Performed by: NURSE PRACTITIONER

## 2024-01-31 RX ORDER — DIVALPROEX SODIUM 250 MG/1
250 TABLET, EXTENDED RELEASE ORAL
Qty: 30 TABLET | Refills: 2 | Status: SHIPPED | OUTPATIENT
Start: 2024-01-31 | End: 2024-04-30

## 2024-01-31 RX ORDER — DIVALPROEX SODIUM 500 MG/1
1000 TABLET, EXTENDED RELEASE ORAL NIGHTLY
Qty: 60 TABLET | Refills: 2 | Status: SHIPPED | OUTPATIENT
Start: 2024-01-31 | End: 2024-04-30

## 2024-01-31 RX ORDER — BUPROPION HYDROCHLORIDE 150 MG/1
150 TABLET ORAL EVERY MORNING
Qty: 30 TABLET | Refills: 3 | Status: SHIPPED | OUTPATIENT
Start: 2024-01-31 | End: 2024-05-30

## 2024-01-31 RX ORDER — ZOLPIDEM TARTRATE 10 MG/1
10 TABLET ORAL NIGHTLY PRN
Qty: 30 TABLET | Refills: 2 | Status: SHIPPED | OUTPATIENT
Start: 2024-01-31 | End: 2024-04-30

## 2024-01-31 RX ORDER — BUSPIRONE HYDROCHLORIDE 15 MG/1
15 TABLET ORAL 2 TIMES DAILY
Qty: 60 TABLET | Refills: 3 | Status: SHIPPED | OUTPATIENT
Start: 2024-01-31 | End: 2024-05-30

## 2024-01-31 ASSESSMENT — PATIENT HEALTH QUESTIONNAIRE - PHQ9
6. FEELING BAD ABOUT YOURSELF - OR THAT YOU ARE A FAILURE OR HAVE LET YOURSELF OR YOUR FAMILY DOWN: 2
SUM OF ALL RESPONSES TO PHQ QUESTIONS 1-9: 14
SUM OF ALL RESPONSES TO PHQ QUESTIONS 1-9: 14
8. MOVING OR SPEAKING SO SLOWLY THAT OTHER PEOPLE COULD HAVE NOTICED. OR THE OPPOSITE, BEING SO FIGETY OR RESTLESS THAT YOU HAVE BEEN MOVING AROUND A LOT MORE THAN USUAL: 2
10. IF YOU CHECKED OFF ANY PROBLEMS, HOW DIFFICULT HAVE THESE PROBLEMS MADE IT FOR YOU TO DO YOUR WORK, TAKE CARE OF THINGS AT HOME, OR GET ALONG WITH OTHER PEOPLE: 2
3. TROUBLE FALLING OR STAYING ASLEEP: 3
9. THOUGHTS THAT YOU WOULD BE BETTER OFF DEAD, OR OF HURTING YOURSELF: 0
7. TROUBLE CONCENTRATING ON THINGS, SUCH AS READING THE NEWSPAPER OR WATCHING TELEVISION: 0
4. FEELING TIRED OR HAVING LITTLE ENERGY: 3
2. FEELING DOWN, DEPRESSED OR HOPELESS: 1
SUM OF ALL RESPONSES TO PHQ QUESTIONS 1-9: 14
5. POOR APPETITE OR OVEREATING: 3
SUM OF ALL RESPONSES TO PHQ9 QUESTIONS 1 & 2: 1
SUM OF ALL RESPONSES TO PHQ QUESTIONS 1-9: 14
1. LITTLE INTEREST OR PLEASURE IN DOING THINGS: 0

## 2024-01-31 NOTE — PROGRESS NOTES
Take 1 tablet by mouth nightly    JUS (generalized anxiety disorder)  -     busPIRone (BUSPAR) 15 MG tablet; Take 15 mg by mouth 2 times daily    Insomnia, psychophysiological  -     zolpidem (AMBIEN) 10 MG tablet; Take 1 tablet by mouth nightly as needed for Sleep (insomnia) for up to 90 days. Max Daily Amount: 10 mg    Other orders  -     buPROPion (WELLBUTRIN XL) 150 MG extended release tablet; Take 1 tablet by mouth every morning       Patient Education and Counseling:  Supportive therapy provided for identified psychosocial stressors.  Medication education provided and decisions regarding medication regimen discussed with patient.     Plan:  -  Continue Depakote ER 1250 mg daily. VPA and LFT's 2/5/2024. Increase Buspar to 15 mg bid. Zolpidem 10 mg HS prn. Change Wellbutrin to XR.  -  Crisis plan reviewed and patient verbally contracts for safety.  Go to ED with emergent symptoms or safety concerns.  -  Risks, benefits, side effects of medications, including any / all black box warnings, discussed with patient, who verbalizes their understanding.     - [employer] web site checked for controlled substances.     Disposition:  home    Follow Up:  Return in 2 months, or call in the interim for any side-effects, decompensation, questions, or problems between now and the next visit.     Return in about 2 months (around 4/1/2024) for Follow up, Labs on Feb 5.        49 minutes face to face with the patient with more than 50% of the total time spent on education, counseling, & coordination of care of the patient regarding ongoing psychiatric illness.        Ольга Ceja, ERIK - CNP   [facility]

## 2024-02-05 ENCOUNTER — OFFICE VISIT (OUTPATIENT)
Dept: FAMILY MEDICINE CLINIC | Facility: CLINIC | Age: 49
End: 2024-02-05
Payer: MEDICARE

## 2024-02-05 VITALS
BODY MASS INDEX: 29.29 KG/M2 | OXYGEN SATURATION: 98 % | SYSTOLIC BLOOD PRESSURE: 124 MMHG | HEART RATE: 110 BPM | DIASTOLIC BLOOD PRESSURE: 82 MMHG | TEMPERATURE: 98.5 F | WEIGHT: 187 LBS

## 2024-02-05 DIAGNOSIS — E66.09 CLASS 1 OBESITY DUE TO EXCESS CALORIES WITH SERIOUS COMORBIDITY AND BODY MASS INDEX (BMI) OF 30.0 TO 30.9 IN ADULT: ICD-10-CM

## 2024-02-05 DIAGNOSIS — E11.51 TYPE 2 DIABETES MELLITUS WITH DIABETIC PERIPHERAL ANGIOPATHY WITHOUT GANGRENE, WITHOUT LONG-TERM CURRENT USE OF INSULIN (HCC): ICD-10-CM
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
## 2024-02-05 DIAGNOSIS — Z98.890 HISTORY OF LUMBAR SURGERY: ICD-10-CM

## 2024-02-05 DIAGNOSIS — R93.7 ABNORMAL X-RAY OF LUMBAR SPINE: Primary | ICD-10-CM

## 2024-02-05 DIAGNOSIS — F31.75 BIPOLAR 1 DISORDER, DEPRESSED, PARTIAL REMISSION (HCC): ICD-10-CM

## 2024-02-05 LAB
ALBUMIN SERPL-MCNC: 3.2 G/DL (ref 3.5–5)
ALBUMIN/GLOB SERPL: 0.9 (ref 0.4–1.6)
ALP SERPL-CCNC: 125 U/L (ref 50–136)
ALT SERPL-CCNC: 7 U/L (ref 12–65)
AST SERPL-CCNC: 9 U/L (ref 15–37)
BILIRUB DIRECT SERPL-MCNC: <0.1 MG/DL
BILIRUB SERPL-MCNC: 0.3 MG/DL (ref 0.2–1.1)
GLOBULIN SER CALC-MCNC: 3.7 G/DL (ref 2.8–4.5)
PROT SERPL-MCNC: 6.9 G/DL (ref 6.3–8.2)
VALPROATE SERPL-MCNC: 83 UG/ML (ref 50–100)

## 2024-02-05 PROCEDURE — G8427 DOCREV CUR MEDS BY ELIG CLIN: HCPCS | Performed by: NURSE PRACTITIONER

## 2024-02-05 PROCEDURE — 2022F DILAT RTA XM EVC RTNOPTHY: CPT | Performed by: NURSE PRACTITIONER

## 2024-02-05 PROCEDURE — G8484 FLU IMMUNIZE NO ADMIN: HCPCS | Performed by: NURSE PRACTITIONER

## 2024-02-05 PROCEDURE — 3079F DIAST BP 80-89 MM HG: CPT | Performed by: NURSE PRACTITIONER

## 2024-02-05 PROCEDURE — 99214 OFFICE O/P EST MOD 30 MIN: CPT | Performed by: NURSE PRACTITIONER

## 2024-02-05 PROCEDURE — 1036F TOBACCO NON-USER: CPT | Performed by: NURSE PRACTITIONER

## 2024-02-05 PROCEDURE — 3046F HEMOGLOBIN A1C LEVEL >9.0%: CPT | Performed by: NURSE PRACTITIONER

## 2024-02-05 PROCEDURE — 3017F COLORECTAL CA SCREEN DOC REV: CPT | Performed by: NURSE PRACTITIONER

## 2024-02-05 PROCEDURE — G8417 CALC BMI ABV UP PARAM F/U: HCPCS | Performed by: NURSE PRACTITIONER

## 2024-02-05 PROCEDURE — 3074F SYST BP LT 130 MM HG: CPT | Performed by: NURSE PRACTITIONER

## 2024-02-05 ASSESSMENT — ENCOUNTER SYMPTOMS
BACK PAIN: 1
GASTROINTESTINAL NEGATIVE: 1

## 2024-02-05 NOTE — PROGRESS NOTES
light.   Cardiovascular:      Rate and Rhythm: Normal rate and regular rhythm.      Pulses: Normal pulses.      Heart sounds: Normal heart sounds.   Pulmonary:      Effort: Pulmonary effort is normal.      Breath sounds: Normal breath sounds.   Abdominal:      General: There is no distension.   Musculoskeletal:         General: Normal range of motion.      Cervical back: Normal range of motion and neck supple.      Right lower leg: No edema.      Left lower leg: No edema.   Skin:     General: Skin is warm and dry.      Coloration: Skin is not jaundiced or pale.   Neurological:      General: No focal deficit present.      Mental Status: She is alert and oriented to person, place, and time.      Gait: Gait normal.   Psychiatric:         Mood and Affect: Mood normal.         Behavior: Behavior normal.         Thought Content: Thought content normal.         Judgment: Judgment normal.          1. Abnormal x-ray of lumbar spine  -     [facility]  2. History of lumbar surgery  -     [facility]  3. Type 2 diabetes mellitus with diabetic peripheral angiopathy without gangrene, without long-term current use of insulin ([facility])  -     liraglutide-weight management 18 MG/3ML SOPN; Start 0.6 mg subcutaneously daily for one week, then increase dose by 0.6 mg/day weekly to target 3 mg subcutaneously daily., Disp-3 mL, R-0Normal  -     Insulin Pen Needle 32G X 4 MM MISC; DAILY Starting Mon 2/5/2024, Disp-100 each, R-3, Normal  4. Class 1 obesity due to excess calories with serious comorbidity and body mass index (BMI) of 30.0 to 30.9 in adult  -     liraglutide-weight management 18 MG/3ML SOPN; Start 0.6 mg subcutaneously daily for one week, then increase dose by 0.6 mg/day weekly to target 3 mg subcutaneously daily., Disp-3 mL, R-0Normal  -     Insulin Pen Needle 32G X 4 MM MISC; DAILY Starting Mon 2/5/2024, Disp-100 each, R-3, Normal  5. Fall, subsequent encounter

## 2024-02-12 ENCOUNTER — OFFICE VISIT (OUTPATIENT)
Dept: ORTHOPEDIC SURGERY | Age: 49
End: 2024-02-12
Payer: MEDICARE

## 2024-02-12 DIAGNOSIS — Z98.1 STATUS POST LUMBAR SPINAL ARTHRODESIS: Primary | ICD-10-CM

## 2024-02-12 PROCEDURE — G8484 FLU IMMUNIZE NO ADMIN: HCPCS | Performed by: ORTHOPAEDIC SURGERY

## 2024-02-12 PROCEDURE — 99213 OFFICE O/P EST LOW 20 MIN: CPT | Performed by: ORTHOPAEDIC SURGERY

## 2024-02-12 PROCEDURE — 3017F COLORECTAL CA SCREEN DOC REV: CPT | Performed by: ORTHOPAEDIC SURGERY

## 2024-02-12 PROCEDURE — G8427 DOCREV CUR MEDS BY ELIG CLIN: HCPCS | Performed by: ORTHOPAEDIC SURGERY

## 2024-02-12 PROCEDURE — 1036F TOBACCO NON-USER: CPT | Performed by: ORTHOPAEDIC SURGERY

## 2024-02-12 PROCEDURE — G8417 CALC BMI ABV UP PARAM F/U: HCPCS | Performed by: ORTHOPAEDIC SURGERY

## 2024-02-12 NOTE — PROGRESS NOTES
Name: Alyssa Lorenzana  YOB: 1975  Gender: female  MRN: [id number]  Age: 48 y.o.       History of present illness:     She is known to me from her L5-S1 fusion performed 2 years ago.  She reports that she had been doing reasonably well until recently when she fell.  She did go to the emergency department and radiographs were obtained.  The radiologist read them as having a screw protruding into the disc space.  Obviously, this concerned she and her  and they made an appointment for follow-up.  She is having her baseline level of back stiffness otherwise no new neurologic symptoms.    Physical Exam:    She is awake and oriented in no acute distress.  Respirations are unlabored and there is no evidence of cyanosis.  Mood and affect are appropriate.  She is neurologically intact.  Lumbar range of motion is mildly restricted.  Straight leg raise is negative.    Radiographic Studies:     AP and lateral views of the lumbar spine were reviewed and interpreted from the emergency department taken January 25, 2024.  She is status post L5-S1 fusion with instrumentation.  All instrumentation is intact and within bone.  The fusion is consolidated.  There is no protrusion of a screw into the disc space as reported.  This is simply an oblique lateral view with an illusion.  But on the AP view, it can be seen that the screw is entirely within the bone.    Radiographic impression: Satisfactory postoperative changes without hardware aberrancy or failure.  Solid fusion.    Diagnosis:      ICD-10-CM    1. Status post lumbar spinal arthrodesis  Z98.1           Assessment/Plan:      She was reassured regarding the radiographs.  There is nothing sinister here.  No indication for additional surgery.  She will continue activities as tolerated.  She will follow-up as needed.       Electronically Signed By Herbert Dempsey MD   02/12/24  8:18 AM

## 2024-02-13 ENCOUNTER — TELEPHONE (OUTPATIENT)
Dept: FAMILY MEDICINE CLINIC | Facility: CLINIC | Age: 49
End: 2024-02-13

## 2024-02-13 ENCOUNTER — TELEPHONE (OUTPATIENT)
Dept: NEUROLOGY | Age: 49
End: 2024-02-13

## 2024-02-13 DIAGNOSIS — E11.51 TYPE 2 DIABETES MELLITUS WITH DIABETIC PERIPHERAL ANGIOPATHY WITHOUT GANGRENE, WITHOUT LONG-TERM CURRENT USE OF INSULIN (HCC): Primary | ICD-10-CM

## 2024-02-13 NOTE — TELEPHONE ENCOUNTER
Ary left patient a  to call us to reschedule her Botox appointment with [facility] on 2/26. Patient returned the call. I let the patient know that the first available appointment will be 3/11. Patient became very disrespectful and started to curse over the phone. I told the patient that I would have to hang up the phone if she continued to be disrespected. She then asked to speak to Mary because she needs a Prior Authorization on a medication but she was still cursing over the phone. I told patient that I will not transfer the call if she continues to curse and be disrespectful.  Patient end up hanging up the phone after she called me out of my name a couple of times.

## 2024-02-13 NOTE — TELEPHONE ENCOUNTER
Patient called and states the weight loss medication is too expensive over 300.00 what can you advise her to do next? Please advise

## 2024-02-14 ENCOUNTER — TELEPHONE (OUTPATIENT)
Dept: NEUROLOGY | Age: 49
End: 2024-02-14

## 2024-02-14 DIAGNOSIS — E11.51 TYPE 2 DIABETES MELLITUS WITH DIABETIC PERIPHERAL ANGIOPATHY WITHOUT GANGRENE, WITHOUT LONG-TERM CURRENT USE OF INSULIN (HCC): ICD-10-CM

## 2024-02-14 NOTE — TELEPHONE ENCOUNTER
Called pt pharmacy, medication went through and copay is 11$. Pharmacy is not in stock currently. Informed pt she would be on a waitlist at [facility] or she could search at different pharmacies and let us know where to send it via SMR[facility]. Pt VU and thanked

## 2024-02-14 NOTE — TELEPHONE ENCOUNTER
Patient called back states they have the semiglutide at [facility] in [place] [phone] if you can resend there Thanks

## 2024-02-14 NOTE — TELEPHONE ENCOUNTER
Patient called to apologize for her conversation yesterday, she explained that she was having a bad day. The patient was very pleasant and apologetic, I rescheduled the patients Botox with [facility] and let the patient know that if we have a cancellation we will call the patient to  her up the patient is also aware that if the situation occurs again she could be dismissed from the practice   Normal rate, regular rhythm.  Heart sounds S1, S2.  No murmurs, rubs or gallops.

## 2024-03-08 SDOH — ECONOMIC STABILITY: INCOME INSECURITY: HOW HARD IS IT FOR YOU TO PAY FOR THE VERY BASICS LIKE FOOD, HOUSING, MEDICAL CARE, AND HEATING?: PATIENT DECLINED

## 2024-03-08 SDOH — ECONOMIC STABILITY: FOOD INSECURITY: WITHIN THE PAST 12 MONTHS, THE FOOD YOU BOUGHT JUST DIDN'T LAST AND YOU DIDN'T HAVE MONEY TO GET MORE.: PATIENT DECLINED

## 2024-03-08 SDOH — ECONOMIC STABILITY: HOUSING INSECURITY
IN THE LAST 12 MONTHS, WAS THERE A TIME WHEN YOU DID NOT HAVE A STEADY PLACE TO SLEEP OR SLEPT IN A SHELTER (INCLUDING NOW)?: PATIENT DECLINED

## 2024-03-08 SDOH — ECONOMIC STABILITY: TRANSPORTATION INSECURITY
IN THE PAST 12 MONTHS, HAS LACK OF TRANSPORTATION KEPT YOU FROM MEETINGS, WORK, OR FROM GETTING THINGS NEEDED FOR DAILY LIVING?: PATIENT DECLINED

## 2024-03-08 SDOH — ECONOMIC STABILITY: FOOD INSECURITY: WITHIN THE PAST 12 MONTHS, YOU WORRIED THAT YOUR FOOD WOULD RUN OUT BEFORE YOU GOT MONEY TO BUY MORE.: PATIENT DECLINED

## 2024-03-11 ENCOUNTER — OFFICE VISIT (OUTPATIENT)
Dept: FAMILY MEDICINE CLINIC | Facility: CLINIC | Age: 49
End: 2024-03-11
Payer: MEDICARE

## 2024-03-11 VITALS
TEMPERATURE: 98.2 F | OXYGEN SATURATION: 98 % | HEART RATE: 88 BPM | DIASTOLIC BLOOD PRESSURE: 88 MMHG | BODY MASS INDEX: 29.29 KG/M2 | SYSTOLIC BLOOD PRESSURE: 124 MMHG | WEIGHT: 187 LBS

## 2024-03-11 DIAGNOSIS — E11.51 TYPE 2 DIABETES MELLITUS WITH DIABETIC PERIPHERAL ANGIOPATHY WITHOUT GANGRENE, WITHOUT LONG-TERM CURRENT USE OF INSULIN (HCC): ICD-10-CM

## 2024-03-11 DIAGNOSIS — E66.3 OVERWEIGHT (BMI 25.0-29.9): Primary | ICD-10-CM

## 2024-03-11 PROCEDURE — 2022F DILAT RTA XM EVC RTNOPTHY: CPT | Performed by: NURSE PRACTITIONER

## 2024-03-11 PROCEDURE — G8484 FLU IMMUNIZE NO ADMIN: HCPCS | Performed by: NURSE PRACTITIONER

## 2024-03-11 PROCEDURE — 3046F HEMOGLOBIN A1C LEVEL >9.0%: CPT | Performed by: NURSE PRACTITIONER

## 2024-03-11 PROCEDURE — 3079F DIAST BP 80-89 MM HG: CPT | Performed by: NURSE PRACTITIONER

## 2024-03-11 PROCEDURE — 1036F TOBACCO NON-USER: CPT | Performed by: NURSE PRACTITIONER

## 2024-03-11 PROCEDURE — 3017F COLORECTAL CA SCREEN DOC REV: CPT | Performed by: NURSE PRACTITIONER

## 2024-03-11 PROCEDURE — G8427 DOCREV CUR MEDS BY ELIG CLIN: HCPCS | Performed by: NURSE PRACTITIONER

## 2024-03-11 PROCEDURE — G8417 CALC BMI ABV UP PARAM F/U: HCPCS | Performed by: NURSE PRACTITIONER

## 2024-03-11 PROCEDURE — 99213 OFFICE O/P EST LOW 20 MIN: CPT | Performed by: NURSE PRACTITIONER

## 2024-03-11 PROCEDURE — 3074F SYST BP LT 130 MM HG: CPT | Performed by: NURSE PRACTITIONER

## 2024-03-11 NOTE — PROGRESS NOTES
Alyssa Lorenzana is a 48 y.o. female who presents today for the following:  Chief Complaint   Patient presents with    Follow-up     Pt presents today for med FU.        Allergies   Allergen Reactions    Demerol Hcl [Meperidine] Other (See Comments)     Increased agitation and confusion, restlessness       Current Outpatient Medications   Medication Sig Dispense Refill    Semaglutide,0.25 or 0.5MG/DOS, 2 MG/3ML SOPN Inject 0.5 mg into the skin every 7 days 3 mL 5    Insulin Pen Needle 32G X 4 MM MISC 1 each by Does not apply route daily 100 each 3    busPIRone (BUSPAR) 15 MG tablet Take 15 mg by mouth 2 times daily 60 tablet 3    buPROPion (WELLBUTRIN XL) 150 MG extended release tablet Take 1 tablet by mouth every morning 30 tablet 3    divalproex (DEPAKOTE ER) 500 MG extended release tablet Take 2 tablets by mouth at bedtime 60 tablet 2    divalproex (DEPAKOTE ER) 250 MG extended release tablet Take 1 tablet by mouth nightly 30 tablet 2    zolpidem (AMBIEN) 10 MG tablet Take 1 tablet by mouth nightly as needed for Sleep (insomnia) for up to 90 days. Max Daily Amount: 10 mg 30 tablet 2    naproxen (NAPROSYN) 500 MG tablet Take 1 tablet by mouth 2 times daily (with meals) 30 tablet 0    naloxone (NARCAN) 4 MG/0.1ML LIQD nasal spray 1 spray by Nasal route as needed for Opioid Reversal 1 each 0    cloNIDine (CATAPRES) 0.1 MG tablet Take 1 tablet by mouth 2 times daily 60 tablet 8    atorvastatin (LIPITOR) 10 MG tablet Take 1 tablet by mouth nightly 90 tablet 3    fluticasone (FLONASE) 50 MCG/ACT nasal spray 2 sprays by Each Nostril route daily 16 g 11    lisinopril (PRINIVIL;ZESTRIL) 20 MG tablet Take 1 tablet by mouth daily 90 tablet 1    omeprazole (PRILOSEC) 40 MG delayed release capsule Take 1 capsule by mouth daily 90 capsule 2    gabapentin (NEURONTIN) 300 MG capsule Take 2 capsules by mouth 3 times daily for 180 days. 180 capsule 5    tamsulosin (FLOMAX) 0.4 MG capsule Take 1 capsule by mouth in the

## 2024-03-12 PROBLEM — E66.3 OVERWEIGHT (BMI 25.0-29.9): Status: ACTIVE | Noted: 2024-03-12

## 2024-03-12 ASSESSMENT — ENCOUNTER SYMPTOMS
NAUSEA: 1
ABDOMINAL PAIN: 0
CONSTIPATION: 0

## 2024-03-25 ENCOUNTER — OFFICE VISIT (OUTPATIENT)
Dept: NEUROLOGY | Age: 49
End: 2024-03-25
Payer: MEDICARE

## 2024-03-25 VITALS — HEART RATE: 108 BPM | OXYGEN SATURATION: 94 % | SYSTOLIC BLOOD PRESSURE: 165 MMHG | DIASTOLIC BLOOD PRESSURE: 90 MMHG

## 2024-03-25 DIAGNOSIS — G43.709 CHRONIC MIGRAINE WITHOUT AURA WITHOUT STATUS MIGRAINOSUS, NOT INTRACTABLE: Primary | ICD-10-CM

## 2024-03-25 PROCEDURE — 64615 CHEMODENERV MUSC MIGRAINE: CPT

## 2024-03-25 NOTE — PROGRESS NOTES
L1.      Total injected: 175 BOTOX 100units/2 cc.   Waste: 25 units     Comments: There were no complications.  The patient tolerated the procedure well.        Signature: Ayana Escobar NP-C    Date: 03/25/24    [address], [place] [address]  Ph: [phone]  Fax: [phone]       I have personally interviewed and examined Alyssa MATHEWSRhoda Griffinff and I have personally reviewed all relevant records including labs and imaging as noted above. I have written all aspects of this note. More than 50% of this time was used for counseling regarding my diagnosis, prognosis, and plans for management.   Procedure Time: 20 minutes  Total visit time (including procedures): 30 minutes

## 2024-03-26 ENCOUNTER — CLINICAL DOCUMENTATION (OUTPATIENT)
Dept: NEUROLOGY | Age: 49
End: 2024-03-26

## 2024-04-01 ENCOUNTER — OFFICE VISIT (OUTPATIENT)
Dept: BEHAVIORAL/MENTAL HEALTH CLINIC | Age: 49
End: 2024-04-01
Payer: MEDICARE

## 2024-04-01 VITALS
HEIGHT: 67 IN | WEIGHT: 185 LBS | BODY MASS INDEX: 29.03 KG/M2 | HEART RATE: 102 BPM | OXYGEN SATURATION: 97 % | SYSTOLIC BLOOD PRESSURE: 146 MMHG | DIASTOLIC BLOOD PRESSURE: 90 MMHG

## 2024-04-01 DIAGNOSIS — F31.75 BIPOLAR 1 DISORDER, DEPRESSED, PARTIAL REMISSION (HCC): ICD-10-CM

## 2024-04-01 DIAGNOSIS — F41.1 GAD (GENERALIZED ANXIETY DISORDER): ICD-10-CM

## 2024-04-01 DIAGNOSIS — F51.04 INSOMNIA, PSYCHOPHYSIOLOGICAL: ICD-10-CM

## 2024-04-01 PROCEDURE — 1036F TOBACCO NON-USER: CPT | Performed by: NURSE PRACTITIONER

## 2024-04-01 PROCEDURE — 3077F SYST BP >= 140 MM HG: CPT | Performed by: NURSE PRACTITIONER

## 2024-04-01 PROCEDURE — G8427 DOCREV CUR MEDS BY ELIG CLIN: HCPCS | Performed by: NURSE PRACTITIONER

## 2024-04-01 PROCEDURE — 3017F COLORECTAL CA SCREEN DOC REV: CPT | Performed by: NURSE PRACTITIONER

## 2024-04-01 PROCEDURE — 99215 OFFICE O/P EST HI 40 MIN: CPT | Performed by: NURSE PRACTITIONER

## 2024-04-01 PROCEDURE — G8417 CALC BMI ABV UP PARAM F/U: HCPCS | Performed by: NURSE PRACTITIONER

## 2024-04-01 PROCEDURE — 3080F DIAST BP >= 90 MM HG: CPT | Performed by: NURSE PRACTITIONER

## 2024-04-01 RX ORDER — BUSPIRONE HYDROCHLORIDE 15 MG/1
15 TABLET ORAL 2 TIMES DAILY
Qty: 60 TABLET | Refills: 3 | Status: SHIPPED | OUTPATIENT
Start: 2024-04-01 | End: 2024-07-30

## 2024-04-01 RX ORDER — BUPROPION HYDROCHLORIDE 300 MG/1
300 TABLET ORAL EVERY MORNING
Qty: 30 TABLET | Refills: 3 | Status: SHIPPED | OUTPATIENT
Start: 2024-04-01 | End: 2024-07-30

## 2024-04-01 RX ORDER — ZOLPIDEM TARTRATE 10 MG/1
10 TABLET ORAL NIGHTLY PRN
Qty: 30 TABLET | Refills: 2 | Status: SHIPPED | OUTPATIENT
Start: 2024-03-04 | End: 2024-06-02

## 2024-04-01 RX ORDER — DIVALPROEX SODIUM 250 MG/1
250 TABLET, EXTENDED RELEASE ORAL
Qty: 30 TABLET | Refills: 3 | Status: SHIPPED | OUTPATIENT
Start: 2024-04-01 | End: 2024-07-30

## 2024-04-01 RX ORDER — DIVALPROEX SODIUM 500 MG/1
1000 TABLET, EXTENDED RELEASE ORAL NIGHTLY
Qty: 60 TABLET | Refills: 3 | Status: SHIPPED | OUTPATIENT
Start: 2024-04-01 | End: 2024-07-30

## 2024-04-01 NOTE — PROGRESS NOTES
OUTPATIENT PSYCHIATRIC RETURN VISIT PROGRESS NOTE    Date of Service: 4/1/2024     Identification    Alyssa Lorenzana is a 48 y.o.  with a past psychiatric history of bipolar d/o, anxiety, insomnia  who presents today for a psychiatric follow up appointment.      CC:  Routine medication management follow up.    Chief Complaint   Patient presents with    Follow-up        Subjective / Interval History:    Pt comes to clinic today and reports that her mother has been diagnosed with Alzheimer's dementia . Pt's sister with whom she has not communicated with in years now expects her to care for her mother every night. This is not a reasonable expectation as patient has multiple medical problems, psychiatric problems and has family issue that require her attention.   There is a history of conflict between pt and this sister and pt has felt taken advantage of. She is encouraged in self care and to set healthy boundaries with sister.   Mood has been stable with current medication regimen with no adverse effects reported and no changes will be made today.   Her last VPA on 2/5/2024 was 83 and LFT' = WNL.    Pt has been medication compliant and denies any side-effects. Pt denies SI, HI and AVH. Does not endorse any manic or psychotic symptoms.    Psychiatric Review Of Systems:  Sleep: generally restful sleep  Appetite: ok  Current suicidal/homicidal ideations: Denies SI/ HI  Current auditory/visual hallucinations: Denies     Medications:    Current Outpatient Medications:     Semaglutide,0.25 or 0.5MG/DOS, 2 MG/3ML SOPN, Inject 0.5 mg into the skin every 7 days, Disp: 3 mL, Rfl: 5    liraglutide-weight management 18 MG/3ML SOPN, Start 0.6 mg subcutaneously daily for one week, then increase dose by 0.6 mg/day weekly to target 3 mg subcutaneously daily., Disp: 3 mL, Rfl: 0    Insulin Pen Needle 32G X 4 MM MISC, 1 each by Does not apply route daily, Disp: 100 each, Rfl: 3    busPIRone (BUSPAR) 15 MG tablet, Take 15 mg by

## 2024-05-15 ENCOUNTER — TELEPHONE (OUTPATIENT)
Dept: NEUROLOGY | Age: 49
End: 2024-05-15

## 2024-05-15 NOTE — TELEPHONE ENCOUNTER
Message from pharmacy, [facility], needs a refill of Botox. Message has been sent to [facility].   For clarification, [facility] isnt her pharmacy. That is my mistake   - AB

## 2024-05-24 DIAGNOSIS — G43.709 CHRONIC MIGRAINE W/O AURA W/O STATUS MIGRAINOSUS, NOT INTRACTABLE: ICD-10-CM

## 2024-06-17 ENCOUNTER — OFFICE VISIT (OUTPATIENT)
Dept: NEUROLOGY | Age: 49
End: 2024-06-17
Payer: MEDICARE

## 2024-06-17 VITALS — OXYGEN SATURATION: 97 % | SYSTOLIC BLOOD PRESSURE: 125 MMHG | DIASTOLIC BLOOD PRESSURE: 82 MMHG | HEART RATE: 87 BPM

## 2024-06-17 DIAGNOSIS — G43.709 CHRONIC MIGRAINE WITHOUT AURA WITHOUT STATUS MIGRAINOSUS, NOT INTRACTABLE: Primary | ICD-10-CM

## 2024-06-17 PROCEDURE — 64615 CHEMODENERV MUSC MIGRAINE: CPT

## 2024-06-17 NOTE — PROGRESS NOTES
[facility] NEUROLOG[facility]  [address] [place] [address], [address]  [place] [address]  Phone: [phone] Fax [phone]  DIDIER Cool          Patient: Alyssa Lorenzana  Provider: DIDIER Cool       Procedure note:  Botulinum Toxin injections     Indication: Chronic Migraine        Subjective:      Patient present for chemodenervation for chronic migraine. The patient received her last Botox injections in March 2024. The patient was referred by Dr. Le and will continue to follow Dr. Glass for medication management. These notes have been reviewed.    The treatments have significantly reduced the frequency and severity of headaches; however, she tends to wear off 1-2 weeks prior to next injection cycle.     Her headaches are described as diffuse and bitemporal head pain. Symptoms reported to be associated with headaches include photophobia, phonophobia, nausea, and vomiting.    The patient has previously tolerated the injections without difficulty or adverse effects.    She taking Ubrelvy for breakthrough headaches.       Past medical history, surgical history, social history, family history, medications and allergies were all reviewed and updated as appropriate.      Outpatient Encounter Medications as of 6/17/2024   Medication Sig Dispense Refill    Onabotulinumtoxin A (BOTOX) 200 units injection Inject 200 Units into the muscle every 3 months 1 each 3    busPIRone (BUSPAR) 15 MG tablet Take 15 mg by mouth 2 times daily 60 tablet 3    divalproex (DEPAKOTE ER) 250 MG extended release tablet Take 1 tablet by mouth nightly 30 tablet 3    divalproex (DEPAKOTE ER) 500 MG extended release tablet Take 2 tablets by mouth at bedtime 60 tablet 3    buPROPion (WELLBUTRIN XL) 300 MG extended release tablet Take 1 tablet by mouth every morning 30 tablet 3    zolpidem (AMBIEN) 10 MG tablet Take 1 tablet by mouth nightly as needed for Sleep (insomnia) for up to 90 days. Max Daily Amount: 10 mg 30 tablet

## 2024-06-25 ENCOUNTER — TELEPHONE (OUTPATIENT)
Dept: FAMILY MEDICINE CLINIC | Facility: CLINIC | Age: 49
End: 2024-06-25

## 2024-06-25 ENCOUNTER — OFFICE VISIT (OUTPATIENT)
Dept: FAMILY MEDICINE CLINIC | Facility: CLINIC | Age: 49
End: 2024-06-25
Payer: MEDICARE

## 2024-06-25 VITALS
DIASTOLIC BLOOD PRESSURE: 98 MMHG | BODY MASS INDEX: 29.76 KG/M2 | OXYGEN SATURATION: 98 % | TEMPERATURE: 98.3 F | SYSTOLIC BLOOD PRESSURE: 162 MMHG | HEART RATE: 80 BPM | WEIGHT: 190 LBS

## 2024-06-25 DIAGNOSIS — I10 ESSENTIAL HYPERTENSION: ICD-10-CM

## 2024-06-25 DIAGNOSIS — R71.8 ELEVATED MCV: ICD-10-CM

## 2024-06-25 DIAGNOSIS — M54.50 CHRONIC MIDLINE LOW BACK PAIN WITHOUT SCIATICA: ICD-10-CM

## 2024-06-25 DIAGNOSIS — M79.2 NEUROPATHIC PAIN: ICD-10-CM

## 2024-06-25 DIAGNOSIS — J30.9 ALLERGIC RHINITIS, UNSPECIFIED SEASONALITY, UNSPECIFIED TRIGGER: ICD-10-CM

## 2024-06-25 DIAGNOSIS — R10.10 PAIN OF UPPER ABDOMEN: ICD-10-CM

## 2024-06-25 DIAGNOSIS — E66.3 OVERWEIGHT (BMI 25.0-29.9): ICD-10-CM

## 2024-06-25 DIAGNOSIS — G25.81 RESTLESS LEGS SYNDROME (RLS): ICD-10-CM

## 2024-06-25 DIAGNOSIS — E11.51 TYPE 2 DIABETES MELLITUS WITH DIABETIC PERIPHERAL ANGIOPATHY WITHOUT GANGRENE, WITHOUT LONG-TERM CURRENT USE OF INSULIN (HCC): ICD-10-CM

## 2024-06-25 DIAGNOSIS — G89.29 CHRONIC MIDLINE LOW BACK PAIN WITHOUT SCIATICA: ICD-10-CM

## 2024-06-25 DIAGNOSIS — K21.9 GASTROESOPHAGEAL REFLUX DISEASE WITHOUT ESOPHAGITIS: ICD-10-CM

## 2024-06-25 DIAGNOSIS — R11.0 NAUSEA: ICD-10-CM

## 2024-06-25 DIAGNOSIS — E11.51 TYPE 2 DIABETES MELLITUS WITH DIABETIC PERIPHERAL ANGIOPATHY WITHOUT GANGRENE, WITHOUT LONG-TERM CURRENT USE OF INSULIN (HCC): Primary | ICD-10-CM

## 2024-06-25 DIAGNOSIS — G47.9 SLEEP DISTURBANCE: ICD-10-CM

## 2024-06-25 DIAGNOSIS — N95.1 VASOMOTOR SYMPTOMS DUE TO MENOPAUSE: ICD-10-CM

## 2024-06-25 LAB
ANION GAP SERPL CALC-SCNC: 12 MMOL/L (ref 9–18)
BUN SERPL-MCNC: 25 MG/DL (ref 6–23)
CALCIUM SERPL-MCNC: 10.3 MG/DL (ref 8.8–10.2)
CHLORIDE SERPL-SCNC: 106 MMOL/L (ref 98–107)
CO2 SERPL-SCNC: 26 MMOL/L (ref 20–28)
CREAT SERPL-MCNC: 1.07 MG/DL (ref 0.6–1.1)
CREAT UR-MCNC: 337 MG/DL (ref 28–217)
ERYTHROCYTE [DISTWIDTH] IN BLOOD BY AUTOMATED COUNT: 12.7 % (ref 11.9–14.6)
FOLATE SERPL-MCNC: 9.2 NG/ML (ref 3.1–17.5)
GLUCOSE SERPL-MCNC: 115 MG/DL (ref 70–99)
HBA1C MFR BLD: 5.9 %
HCT VFR BLD AUTO: 47.1 % (ref 35.8–46.3)
HGB BLD-MCNC: 15.3 G/DL (ref 11.7–15.4)
LIPASE SERPL-CCNC: 41 U/L (ref 13–60)
MCH RBC QN AUTO: 32.3 PG (ref 26.1–32.9)
MCHC RBC AUTO-ENTMCNC: 32.5 G/DL (ref 31.4–35)
MCV RBC AUTO: 99.6 FL (ref 82–102)
MICROALBUMIN UR-MCNC: 4.24 MG/DL (ref 0–20)
MICROALBUMIN/CREAT UR-RTO: 13 MG/G (ref 0–30)
NRBC # BLD: 0 K/UL (ref 0–0.2)
PLATELET # BLD AUTO: 132 K/UL (ref 150–450)
PMV BLD AUTO: 12.5 FL (ref 9.4–12.3)
POTASSIUM SERPL-SCNC: 5.2 MMOL/L (ref 3.5–5.1)
RBC # BLD AUTO: 4.73 M/UL (ref 4.05–5.2)
SODIUM SERPL-SCNC: 144 MMOL/L (ref 136–145)
WBC # BLD AUTO: 6.3 K/UL (ref 4.3–11.1)

## 2024-06-25 PROCEDURE — G8427 DOCREV CUR MEDS BY ELIG CLIN: HCPCS | Performed by: NURSE PRACTITIONER

## 2024-06-25 PROCEDURE — 2022F DILAT RTA XM EVC RTNOPTHY: CPT | Performed by: NURSE PRACTITIONER

## 2024-06-25 PROCEDURE — 3080F DIAST BP >= 90 MM HG: CPT | Performed by: NURSE PRACTITIONER

## 2024-06-25 PROCEDURE — G2211 COMPLEX E/M VISIT ADD ON: HCPCS | Performed by: NURSE PRACTITIONER

## 2024-06-25 PROCEDURE — 99214 OFFICE O/P EST MOD 30 MIN: CPT | Performed by: NURSE PRACTITIONER

## 2024-06-25 PROCEDURE — 3077F SYST BP >= 140 MM HG: CPT | Performed by: NURSE PRACTITIONER

## 2024-06-25 PROCEDURE — 1036F TOBACCO NON-USER: CPT | Performed by: NURSE PRACTITIONER

## 2024-06-25 PROCEDURE — 3046F HEMOGLOBIN A1C LEVEL >9.0%: CPT | Performed by: NURSE PRACTITIONER

## 2024-06-25 PROCEDURE — G8417 CALC BMI ABV UP PARAM F/U: HCPCS | Performed by: NURSE PRACTITIONER

## 2024-06-25 PROCEDURE — 83036 HEMOGLOBIN GLYCOSYLATED A1C: CPT | Performed by: NURSE PRACTITIONER

## 2024-06-25 PROCEDURE — 3017F COLORECTAL CA SCREEN DOC REV: CPT | Performed by: NURSE PRACTITIONER

## 2024-06-25 RX ORDER — ATORVASTATIN CALCIUM 10 MG/1
10 TABLET, FILM COATED ORAL NIGHTLY
Qty: 90 TABLET | Refills: 3 | Status: SHIPPED | OUTPATIENT
Start: 2024-06-25

## 2024-06-25 RX ORDER — LISINOPRIL 20 MG/1
20 TABLET ORAL DAILY
Qty: 90 TABLET | Refills: 1 | Status: SHIPPED | OUTPATIENT
Start: 2024-06-25

## 2024-06-25 RX ORDER — GABAPENTIN 300 MG/1
600 CAPSULE ORAL 3 TIMES DAILY
Qty: 180 CAPSULE | Refills: 5 | Status: SHIPPED | OUTPATIENT
Start: 2024-06-25 | End: 2024-12-22

## 2024-06-25 RX ORDER — CLONIDINE HYDROCHLORIDE 0.1 MG/1
0.1 TABLET ORAL 2 TIMES DAILY
Qty: 60 TABLET | Refills: 8 | Status: SHIPPED | OUTPATIENT
Start: 2024-06-25

## 2024-06-25 RX ORDER — ONDANSETRON 4 MG/1
4 TABLET, ORALLY DISINTEGRATING ORAL 3 TIMES DAILY PRN
Qty: 30 TABLET | Refills: 0 | Status: SHIPPED | OUTPATIENT
Start: 2024-06-25

## 2024-06-25 RX ORDER — ROPINIROLE 0.5 MG/1
0.5 TABLET, FILM COATED ORAL NIGHTLY
Qty: 30 TABLET | Refills: 5 | Status: SHIPPED | OUTPATIENT
Start: 2024-06-25

## 2024-06-25 RX ORDER — OMEPRAZOLE 40 MG/1
40 CAPSULE, DELAYED RELEASE ORAL DAILY
Qty: 90 CAPSULE | Refills: 2 | Status: SHIPPED | OUTPATIENT
Start: 2024-06-25

## 2024-06-25 RX ORDER — FLUTICASONE PROPIONATE 50 MCG
2 SPRAY, SUSPENSION (ML) NASAL DAILY
Qty: 16 G | Refills: 11 | Status: SHIPPED | OUTPATIENT
Start: 2024-06-25

## 2024-06-25 ASSESSMENT — ENCOUNTER SYMPTOMS
CONSTIPATION: 0
BLOOD IN STOOL: 0
NAUSEA: 1
RESPIRATORY NEGATIVE: 1
BACK PAIN: 1
ABDOMINAL PAIN: 1
DIARRHEA: 0
VOMITING: 0

## 2024-06-25 NOTE — PATIENT INSTRUCTIONS
with olive or canola oil instead of butter or oils that are high in saturated fat.  Switch from 2% milk or whole milk to 1% or fat-free milk.  Dip raw vegetables in a vinaigrette dressing or hummus instead of dips made from mayonnaise or sour cream.  Have a piece of fruit for dessert instead of a piece of cake. Try baked apples, or have some dried fruit.  Tips for eating out  Try broiled, grilled, baked, or poached fish instead of having it fried or breaded.  Ask your  to have your meals prepared with olive oil instead of butter.  Order dishes made with marinara sauce or sauces made from olive oil. Avoid sauces made from cream or mayonnaise.  Choose whole-grain breads, whole wheat pasta and pizza crust, brown rice, beans, and lentils.  Cut back on butter or margarine on bread. Instead, you can dip your bread in a small amount of olive oil.  Ask for a side salad or grilled vegetables instead of french fries or chips.  Where can you learn more?  Go to https://www.[employer].net/patientEd and enter O407 to learn more about \"Learning About the Mediterranean Diet.\"  Current as of: September 20, 2023  Content Version: 14.1  © 2006-2024 [employer].   Care instructions adapted under license by [employer]. If you have questions about a medical condition or this instruction, always ask your healthcare professional. [employer] disclaims any warranty or liability for your use of this information.

## 2024-06-25 NOTE — PROGRESS NOTES
Alyssa Lorenzana is a 48 y.o. female who presents today for the following:  Chief Complaint   Patient presents with    Follow-up     Pt presents today for FU. Pt would like medication for nausea and RLS.        Allergies   Allergen Reactions    Demerol Hcl [Meperidine] Other (See Comments)     Increased agitation and confusion, restlessness       Current Outpatient Medications   Medication Sig Dispense Refill    ondansetron (ZOFRAN-ODT) 4 MG disintegrating tablet Take 1 tablet by mouth 3 times daily as needed for Nausea or Vomiting 30 tablet 0    fluticasone (FLONASE) 50 MCG/ACT nasal spray 2 sprays by Each Nostril route daily 16 g 11    Semaglutide, 1 MG/DOSE, (OZEMPIC) 4 MG/3ML SOPN sc injection Inject 1 mg into the skin every 7 days 3 mL 5    omeprazole (PRILOSEC) 40 MG delayed release capsule Take 1 capsule by mouth daily 90 capsule 2    cloNIDine (CATAPRES) 0.1 MG tablet Take 1 tablet by mouth 2 times daily 60 tablet 8    atorvastatin (LIPITOR) 10 MG tablet Take 1 tablet by mouth nightly 90 tablet 3    gabapentin (NEURONTIN) 300 MG capsule Take 2 capsules by mouth 3 times daily for 180 days. 180 capsule 5    lisinopril (PRINIVIL;ZESTRIL) 20 MG tablet Take 1 tablet by mouth daily 90 tablet 1    rOPINIRole (REQUIP) 0.5 MG tablet Take 1 tablet by mouth nightly 30 tablet 5    Onabotulinumtoxin A (BOTOX) 200 units injection Inject 200 Units into the muscle every 3 months 1 each 3    busPIRone (BUSPAR) 15 MG tablet Take 15 mg by mouth 2 times daily 60 tablet 3    divalproex (DEPAKOTE ER) 250 MG extended release tablet Take 1 tablet by mouth nightly 30 tablet 3    divalproex (DEPAKOTE ER) 500 MG extended release tablet Take 2 tablets by mouth at bedtime 60 tablet 3    buPROPion (WELLBUTRIN XL) 300 MG extended release tablet Take 1 tablet by mouth every morning 30 tablet 3    Insulin Pen Needle 32G X 4 MM MISC 1 each by Does not apply route daily 100 each 3    naloxone (NARCAN) 4 MG/0.1ML LIQD nasal spray 1

## 2024-07-02 ENCOUNTER — TELEMEDICINE (OUTPATIENT)
Dept: BEHAVIORAL/MENTAL HEALTH CLINIC | Age: 49
End: 2024-07-02
Payer: MEDICARE

## 2024-07-02 DIAGNOSIS — F41.1 GAD (GENERALIZED ANXIETY DISORDER): ICD-10-CM

## 2024-07-02 DIAGNOSIS — F31.75 BIPOLAR 1 DISORDER, DEPRESSED, PARTIAL REMISSION (HCC): Primary | ICD-10-CM

## 2024-07-02 DIAGNOSIS — F51.04 INSOMNIA, PSYCHOPHYSIOLOGICAL: ICD-10-CM

## 2024-07-02 PROCEDURE — 99443 PR PHYS/QHP TELEPHONE EVALUATION 21-30 MIN: CPT | Performed by: NURSE PRACTITIONER

## 2024-07-02 RX ORDER — DIVALPROEX SODIUM 500 MG/1
1000 TABLET, EXTENDED RELEASE ORAL NIGHTLY
Qty: 60 TABLET | Refills: 3 | Status: SHIPPED | OUTPATIENT
Start: 2024-07-02 | End: 2024-10-30

## 2024-07-02 RX ORDER — DIVALPROEX SODIUM 250 MG/1
250 TABLET, EXTENDED RELEASE ORAL
Qty: 30 TABLET | Refills: 3 | Status: SHIPPED | OUTPATIENT
Start: 2024-07-02 | End: 2024-10-30

## 2024-07-02 RX ORDER — BUSPIRONE HYDROCHLORIDE 15 MG/1
15 TABLET ORAL 2 TIMES DAILY
Qty: 60 TABLET | Refills: 3 | Status: SHIPPED | OUTPATIENT
Start: 2024-07-02 | End: 2024-10-30

## 2024-07-02 RX ORDER — ZOLPIDEM TARTRATE 10 MG/1
10 TABLET ORAL NIGHTLY PRN
Qty: 30 TABLET | Refills: 2 | Status: SHIPPED | OUTPATIENT
Start: 2024-07-07 | End: 2024-10-05

## 2024-07-02 RX ORDER — BUPROPION HYDROCHLORIDE 300 MG/1
300 TABLET ORAL EVERY MORNING
Qty: 30 TABLET | Refills: 3 | Status: SHIPPED | OUTPATIENT
Start: 2024-07-02 | End: 2024-10-30

## 2024-07-02 ASSESSMENT — PATIENT HEALTH QUESTIONNAIRE - PHQ9
10. IF YOU CHECKED OFF ANY PROBLEMS, HOW DIFFICULT HAVE THESE PROBLEMS MADE IT FOR YOU TO DO YOUR WORK, TAKE CARE OF THINGS AT HOME, OR GET ALONG WITH OTHER PEOPLE: NOT DIFFICULT AT ALL
SUM OF ALL RESPONSES TO PHQ QUESTIONS 1-9: 0
2. FEELING DOWN, DEPRESSED OR HOPELESS: NOT AT ALL
4. FEELING TIRED OR HAVING LITTLE ENERGY: NOT AT ALL
SUM OF ALL RESPONSES TO PHQ QUESTIONS 1-9: 0
9. THOUGHTS THAT YOU WOULD BE BETTER OFF DEAD, OR OF HURTING YOURSELF: NOT AT ALL
5. POOR APPETITE OR OVEREATING: NOT AT ALL
7. TROUBLE CONCENTRATING ON THINGS, SUCH AS READING THE NEWSPAPER OR WATCHING TELEVISION: NOT AT ALL
SUM OF ALL RESPONSES TO PHQ QUESTIONS 1-9: 0
SUM OF ALL RESPONSES TO PHQ QUESTIONS 1-9: 0
6. FEELING BAD ABOUT YOURSELF - OR THAT YOU ARE A FAILURE OR HAVE LET YOURSELF OR YOUR FAMILY DOWN: NOT AT ALL
1. LITTLE INTEREST OR PLEASURE IN DOING THINGS: NOT AT ALL
3. TROUBLE FALLING OR STAYING ASLEEP: NOT AT ALL
SUM OF ALL RESPONSES TO PHQ9 QUESTIONS 1 & 2: 0
8. MOVING OR SPEAKING SO SLOWLY THAT OTHER PEOPLE COULD HAVE NOTICED. OR THE OPPOSITE, BEING SO FIGETY OR RESTLESS THAT YOU HAVE BEEN MOVING AROUND A LOT MORE THAN USUAL: NOT AT ALL

## 2024-07-02 NOTE — PROGRESS NOTES
for safety.  Go to ED with emergent symptoms or safety concerns.  -  Risks, benefits, side effects of medications, including any / all black box warnings, discussed with patient, who verbalizes their understanding.     - [employer] web site checked for controlled substances.     Disposition:  home    Follow Up:  Return in 3 months, or call in the interim for any side-effects, decompensation, questions, or problems between now and the next visit.     Return in about 3 months (around 10/2/2024) for Follow up.        30 minutes face to face with the patient with more than 50% of the total time spent on education, counseling, & coordination of care of the patient regarding ongoing psychiatric illness.        Ольга Ceja, APRN - CNP   Former[facility]

## 2024-07-02 NOTE — RESULT ENCOUNTER NOTE
Potassium slight elevation; avoid potassium supplement if taking.  Platelets remain low but improved and stable.  Other labs appear stable.    Please let me know if patient has any further questions or concerns.

## 2024-07-08 ENCOUNTER — NURSE ONLY (OUTPATIENT)
Dept: FAMILY MEDICINE CLINIC | Facility: CLINIC | Age: 49
End: 2024-07-08

## 2024-07-08 VITALS — OXYGEN SATURATION: 99 % | SYSTOLIC BLOOD PRESSURE: 136 MMHG | DIASTOLIC BLOOD PRESSURE: 84 MMHG | HEART RATE: 87 BPM

## 2024-07-08 DIAGNOSIS — I10 ESSENTIAL HYPERTENSION: Primary | ICD-10-CM

## 2024-07-08 DIAGNOSIS — F31.75 BIPOLAR 1 DISORDER, DEPRESSED, PARTIAL REMISSION (HCC): ICD-10-CM

## 2024-07-08 LAB
ALBUMIN SERPL-MCNC: 3.8 G/DL (ref 3.5–5)
ALBUMIN/GLOB SERPL: 1.3 (ref 1–1.9)
ALP SERPL-CCNC: 144 U/L (ref 35–104)
ALT SERPL-CCNC: 11 U/L (ref 12–65)
AST SERPL-CCNC: 17 U/L (ref 15–37)
BILIRUB DIRECT SERPL-MCNC: <0.2 MG/DL (ref 0–0.4)
BILIRUB SERPL-MCNC: <0.2 MG/DL (ref 0–1.2)
GLOBULIN SER CALC-MCNC: 2.8 G/DL (ref 2.3–3.5)
PROT SERPL-MCNC: 6.6 G/DL (ref 6.3–8.2)
VALPROATE SERPL-MCNC: 5 UG/ML (ref 50–100)

## 2024-07-08 NOTE — PROGRESS NOTES
Patient presented to [facility] this morning for a nurse visit BP check. At her last office visit in June, it was documented that patient was no longer taking her Lisinopril. Patient is now back on this medication & has been taking as directed. Her BP in office today was 136/84 with a HR of 87.   Patient instructed to continue current dose of medication & keep follow up appt w/ PCP. She verbalized understanding & thanked.

## 2024-07-16 DIAGNOSIS — F31.75 BIPOLAR 1 DISORDER, DEPRESSED, PARTIAL REMISSION (HCC): Primary | ICD-10-CM

## 2024-07-17 ENCOUNTER — HOSPITAL ENCOUNTER (OUTPATIENT)
Dept: ULTRASOUND IMAGING | Age: 49
Discharge: HOME | End: 2024-07-20
Payer: MEDICARE

## 2024-07-17 ENCOUNTER — LAB (OUTPATIENT)
Dept: FAMILY MEDICINE CLINIC | Facility: CLINIC | Age: 49
End: 2024-07-17

## 2024-07-17 DIAGNOSIS — F31.75 BIPOLAR 1 DISORDER, DEPRESSED, PARTIAL REMISSION (HCC): ICD-10-CM

## 2024-07-17 LAB
ALBUMIN SERPL-MCNC: 3.4 G/DL (ref 3.5–5)
ALBUMIN/GLOB SERPL: 1.1 (ref 1–1.9)
ALP SERPL-CCNC: 156 U/L (ref 35–104)
ALT SERPL-CCNC: 14 U/L (ref 12–65)
AST SERPL-CCNC: 18 U/L (ref 15–37)
BILIRUB DIRECT SERPL-MCNC: <0.2 MG/DL (ref 0–0.4)
BILIRUB SERPL-MCNC: <0.2 MG/DL (ref 0–1.2)
GLOBULIN SER CALC-MCNC: 3 G/DL (ref 2.3–3.5)
PROT SERPL-MCNC: 6.4 G/DL (ref 6.3–8.2)
VALPROATE SERPL-MCNC: 55 UG/ML (ref 50–100)

## 2024-07-17 PROCEDURE — 76705 ECHO EXAM OF ABDOMEN: CPT

## 2024-07-18 DIAGNOSIS — R74.8 ELEVATED ALKALINE PHOSPHATASE LEVEL: Primary | ICD-10-CM

## 2024-07-24 ENCOUNTER — TELEPHONE (OUTPATIENT)
Dept: FAMILY MEDICINE CLINIC | Facility: CLINIC | Age: 49
End: 2024-07-24

## 2024-07-24 DIAGNOSIS — R10.10 PAIN OF UPPER ABDOMEN: ICD-10-CM

## 2024-07-24 DIAGNOSIS — K26.9 DUODENAL ULCER: ICD-10-CM

## 2024-07-24 DIAGNOSIS — K21.9 GASTROESOPHAGEAL REFLUX DISEASE WITHOUT ESOPHAGITIS: ICD-10-CM

## 2024-07-24 DIAGNOSIS — R11.0 NAUSEA: Primary | ICD-10-CM

## 2024-08-02 DIAGNOSIS — R74.8 ELEVATED ALKALINE PHOSPHATASE LEVEL: ICD-10-CM

## 2024-08-05 LAB
ALP BONE CFR SERPL: 33 % (ref 14–68)
ALP INTEST CFR SERPL: 18 % (ref 0–18)
ALP LIVER CFR SERPL: 49 % (ref 18–85)
ALP SERPL-CCNC: 174 IU/L (ref 44–121)

## 2024-08-07 ENCOUNTER — OFFICE VISIT (OUTPATIENT)
Dept: UROLOGY | Age: 49
End: 2024-08-07
Payer: MEDICARE

## 2024-08-07 DIAGNOSIS — R33.9 RETENTION OF URINE, UNSPECIFIED: Primary | ICD-10-CM

## 2024-08-07 DIAGNOSIS — N20.0 RENAL STONE: ICD-10-CM

## 2024-08-07 DIAGNOSIS — E11.51 TYPE 2 DIABETES MELLITUS WITH DIABETIC PERIPHERAL ANGIOPATHY WITHOUT GANGRENE, WITHOUT LONG-TERM CURRENT USE OF INSULIN (HCC): ICD-10-CM

## 2024-08-07 DIAGNOSIS — N39.0 URINARY TRACT INFECTION WITHOUT HEMATURIA, SITE UNSPECIFIED: ICD-10-CM

## 2024-08-07 DIAGNOSIS — N35.92 STRICTURE OF FEMALE URETHRA, UNSPECIFIED STRICTURE TYPE: ICD-10-CM

## 2024-08-07 PROCEDURE — 2022F DILAT RTA XM EVC RTNOPTHY: CPT | Performed by: UROLOGY

## 2024-08-07 PROCEDURE — 3017F COLORECTAL CA SCREEN DOC REV: CPT | Performed by: UROLOGY

## 2024-08-07 PROCEDURE — 99213 OFFICE O/P EST LOW 20 MIN: CPT | Performed by: UROLOGY

## 2024-08-07 PROCEDURE — G8427 DOCREV CUR MEDS BY ELIG CLIN: HCPCS | Performed by: UROLOGY

## 2024-08-07 PROCEDURE — 3046F HEMOGLOBIN A1C LEVEL >9.0%: CPT | Performed by: UROLOGY

## 2024-08-07 PROCEDURE — 1036F TOBACCO NON-USER: CPT | Performed by: UROLOGY

## 2024-08-07 PROCEDURE — G8417 CALC BMI ABV UP PARAM F/U: HCPCS | Performed by: UROLOGY

## 2024-08-07 ASSESSMENT — ENCOUNTER SYMPTOMS
BACK PAIN: 0
NAUSEA: 0

## 2024-08-07 NOTE — PROGRESS NOTES
Lack of Transportation (Medical): Not on file     Lack of Transportation (Non-Medical): Patient declined   Physical Activity: Inactive (2024)    Exercise Vital Sign     Days of Exercise per Week: 0 days     Minutes of Exercise per Session: 0 min   Stress: Not on file   Social Connections: Not on file   Intimate Partner Violence: Not on file   Housing Stability: Unknown (3/8/2024)    Housing Stability Vital Sign     Unable to Pay for Housing in the Last Year: Not on file     Number of Places Lived in the Last Year: Not on file     Unstable Housing in the Last Year: Patient declined     Family History   Problem Relation Age of Onset    Other Mother         bipolar disorder    COPD Mother     High Cholesterol Mother     Mental Illness Mother     Diabetes Father     Hypertension Father     Cancer Father     Diabetes Sister     Hypertension Sister     Colon Cancer Maternal Grandmother     Early Death Paternal Uncle     Heart Attack Maternal Uncle          on shot    Breast Cancer Neg Hx        Review of Systems  Constitutional:   Negative for fever.  GI:  Negative for nausea.  Musculoskeletal:  Negative for back pain.      There were no vitals taken for this visit.    Urinalysis  UA - Dipstick  Results for orders placed or performed in visit on 10/31/23   AMB POC URINALYSIS DIP STICK AUTO W/O MICRO   Result Value Ref Range    Color (UA POC)      Clarity (UA POC)      Glucose, Urine, POC Negative Negative    Bilirubin, Urine, POC Negative Negative    KETONES, Urine, POC Negative Negative    Specific Gravity, Urine, POC 1.015 1.001 - 1.035    Blood (UA POC) Negative Negative    pH, Urine, POC 6.5 4.6 - 8.0    Protein, Urine, POC Negative Negative    Urobilinogen, POC 0.2 mg/dL     Nitrite, Urine, POC Negative Negative    Leukocyte Esterase, Urine, POC Negative Negative       UA - Micro  WBC - 0  RBC - 0  Bacteria - 0  Epith - 0    Physical Exam    Assessment and Plan  Alyssa was seen today for

## 2024-08-23 ENCOUNTER — TELEPHONE (OUTPATIENT)
Dept: FAMILY MEDICINE CLINIC | Facility: CLINIC | Age: 49
End: 2024-08-23

## 2024-08-23 DIAGNOSIS — M54.50 CHRONIC LOW BACK PAIN, UNSPECIFIED BACK PAIN LATERALITY, UNSPECIFIED WHETHER SCIATICA PRESENT: ICD-10-CM

## 2024-08-23 DIAGNOSIS — G89.29 CHRONIC LOW BACK PAIN, UNSPECIFIED BACK PAIN LATERALITY, UNSPECIFIED WHETHER SCIATICA PRESENT: ICD-10-CM

## 2024-08-23 DIAGNOSIS — Z98.890 HISTORY OF LUMBAR SURGERY: ICD-10-CM

## 2024-08-23 DIAGNOSIS — M46.1 SACROILIITIS, NOT ELSEWHERE CLASSIFIED (HCC): Primary | ICD-10-CM

## 2024-08-29 ENCOUNTER — OFFICE VISIT (OUTPATIENT)
Dept: NEUROLOGY | Age: 49
End: 2024-08-29
Payer: MEDICARE

## 2024-08-29 DIAGNOSIS — G43.709 CHRONIC MIGRAINE WITHOUT AURA WITHOUT STATUS MIGRAINOSUS, NOT INTRACTABLE: Primary | ICD-10-CM

## 2024-08-29 PROCEDURE — G8417 CALC BMI ABV UP PARAM F/U: HCPCS | Performed by: PSYCHIATRY & NEUROLOGY

## 2024-08-29 PROCEDURE — 3017F COLORECTAL CA SCREEN DOC REV: CPT | Performed by: PSYCHIATRY & NEUROLOGY

## 2024-08-29 PROCEDURE — 1036F TOBACCO NON-USER: CPT | Performed by: PSYCHIATRY & NEUROLOGY

## 2024-08-29 PROCEDURE — G8427 DOCREV CUR MEDS BY ELIG CLIN: HCPCS | Performed by: PSYCHIATRY & NEUROLOGY

## 2024-08-29 PROCEDURE — 99214 OFFICE O/P EST MOD 30 MIN: CPT | Performed by: PSYCHIATRY & NEUROLOGY

## 2024-08-29 RX ORDER — UBROGEPANT 100 MG/1
TABLET ORAL
Qty: 12 TABLET | Refills: 11 | Status: SHIPPED | OUTPATIENT
Start: 2024-08-29

## 2024-08-29 ASSESSMENT — ENCOUNTER SYMPTOMS
RESPIRATORY NEGATIVE: 1
EYES NEGATIVE: 1
ALLERGIC/IMMUNOLOGIC NEGATIVE: 1

## 2024-08-29 NOTE — PROGRESS NOTES
[facility]UROLOG[facility]  [address] [place] [address], [address]  [place] [address]  Phone: [phone] Fax [phone]  Dr. Julio Glass      8/29/2024  Alyssa Lorenzana     Patient is referred by the following provider for consultation regarding as below:       I reviewed the available records and notes and have examined patient with the following findings:     Chief Complaint:  No chief complaint on file.         HPI: This is a right handed 48 y.o.  female who is very pleasant very appropriate patient who unfortunately suffers from chronic migraine headaches.  She has been getting her Botox done with [facility].  And doing great.  With Botox and Ubrelvy she gets a total of about 1 headache a month.  Most of them come towards the end of the 3 months when she is due for her Botox.  Her headaches are throbbing pounding sharp shooting dull achy headaches there with nausea no vomiting definitely photophobia and phonophobia.  At the worst they were 30 a month.  She has been tried on a multitude of medication and she is currently on Botox as I mentioned she is on Ubrelvy.  She also had to take Depakote and gabapentin the gabapentin for back pain and Depakote was for mood stabilization by her psychiatrist but both will help with headaches today with blood pressure medications.  She is stable doing well.    In the past she has tried over-the-counter medicines Qulipta Topamax Cymbalta Elavil Ajovy gabapentin Depakote beta-blockers Klonopin Catapres Zofran BuSpar lisinopril and Wellbutrin.  Qulipta was questionable.    IMAGING REVIEW:  I REVIEWED PERTINENT  IMAGES AND REPORTS WITH THE PATIENT PERSONALLY, DIRECTLY AND FULLY.     Past Medical History:  Past Medical History:   Diagnosis Date    ADHD (attention deficit hyperactivity disorder) 1980    Aneurysm ([facility])     Aorta aneurysm measured 4.8/4.7cm on 10/06/2021, monitoring at present time    Anxiety 10-16-91    Bicuspid aortic valve     Bipolar disorder ([facility])         Allergies   Allergen Reactions    Demerol Hcl [Meperidine] Other (See Comments)     Increased agitation and confusion, restlessness       Review of Systems:  Review of Systems   Constitutional: Negative.    HENT: Negative.     Eyes: Negative.    Respiratory: Negative.     Endocrine: Negative.    Genitourinary: Negative.    Allergic/Immunologic: Negative.    Neurological:  Positive for headaches.      Failed to redirect to the Timeline version of the [employer] SmartLink.  Failed to redirect to the Timeline version of the [employer] SmartLink.       There were no vitals filed for this visit.     Physical Exam  Constitutional:       Appearance: Normal appearance.   HENT:      Head: Normocephalic and atraumatic.   Eyes:      Extraocular Movements: Extraocular movements intact and EOM normal.      Pupils: Pupils are equal, round, and reactive to light.   Cardiovascular:      Rate and Rhythm: Normal rate.      Pulses: Normal pulses.   Pulmonary:      Effort: Pulmonary effort is normal.   Abdominal:      General: Abdomen is flat.   Neurological:      Mental Status: She is alert and oriented to person, place, and time.      Gait: Gait is intact.          Neurologic Exam     Mental Status   Oriented to person, place, and time.   Attention: normal. Concentration: normal.   Level of consciousness: alert  Knowledge: good.     Cranial Nerves     CN II   Visual fields full to confrontation.     CN III, IV, VI   Pupils are equal, round, and reactive to light.  Extraocular motions are normal.     CN VII   Facial expression full, symmetric.     Motor Exam   Right arm tone: normal  Left arm tone: normal  Right leg tone: normal  Left leg tone: normal    Gait, Coordination, and Reflexes     Gait  Gait: normal    Tremor   Resting tremor: absent  Intention tremor: absent  Action tremor: absent          Assessment   Assessment / Plan:    Diagnoses and all orders for this visit:    Chronic migraine without aura without status migrainosus, not

## 2024-09-17 ENCOUNTER — OFFICE VISIT (OUTPATIENT)
Age: 49
End: 2024-09-17

## 2024-09-17 DIAGNOSIS — M46.1 SACROILIITIS, NOT ELSEWHERE CLASSIFIED (HCC): Primary | ICD-10-CM

## 2024-09-17 DIAGNOSIS — M96.1 FAILED BACK SURGICAL SYNDROME: ICD-10-CM

## 2024-09-17 RX ORDER — BACLOFEN 10 MG/1
10 TABLET ORAL 3 TIMES DAILY
Qty: 90 TABLET | Refills: 1 | Status: SHIPPED | OUTPATIENT
Start: 2024-09-17 | End: 2024-11-16

## 2024-09-17 ASSESSMENT — ENCOUNTER SYMPTOMS
SHORTNESS OF BREATH: 0
ALLERGIC/IMMUNOLOGIC NEGATIVE: 1
EYES NEGATIVE: 1
ABDOMINAL PAIN: 0

## 2024-09-23 ENCOUNTER — OFFICE VISIT (OUTPATIENT)
Dept: NEUROLOGY | Age: 49
End: 2024-09-23
Payer: MEDICARE

## 2024-09-23 VITALS — HEART RATE: 85 BPM | SYSTOLIC BLOOD PRESSURE: 101 MMHG | OXYGEN SATURATION: 96 % | DIASTOLIC BLOOD PRESSURE: 72 MMHG

## 2024-09-23 DIAGNOSIS — G43.709 CHRONIC MIGRAINE WITHOUT AURA WITHOUT STATUS MIGRAINOSUS, NOT INTRACTABLE: Primary | ICD-10-CM

## 2024-09-23 PROCEDURE — 64615 CHEMODENERV MUSC MIGRAINE: CPT | Performed by: PSYCHIATRY & NEUROLOGY

## 2024-10-03 ENCOUNTER — TELEMEDICINE (OUTPATIENT)
Dept: BEHAVIORAL/MENTAL HEALTH CLINIC | Age: 49
End: 2024-10-03

## 2024-10-03 DIAGNOSIS — F51.04 INSOMNIA, PSYCHOPHYSIOLOGICAL: ICD-10-CM

## 2024-10-03 DIAGNOSIS — F31.75 BIPOLAR 1 DISORDER, DEPRESSED, PARTIAL REMISSION (HCC): Primary | ICD-10-CM

## 2024-10-03 DIAGNOSIS — F41.1 GAD (GENERALIZED ANXIETY DISORDER): ICD-10-CM

## 2024-10-03 RX ORDER — DIVALPROEX SODIUM 500 MG/1
1000 TABLET, FILM COATED, EXTENDED RELEASE ORAL NIGHTLY
Qty: 60 TABLET | Refills: 3 | Status: SHIPPED | OUTPATIENT
Start: 2024-10-03 | End: 2025-01-31

## 2024-10-03 RX ORDER — BUPROPION HYDROCHLORIDE 300 MG/1
300 TABLET ORAL EVERY MORNING
Qty: 30 TABLET | Refills: 3 | Status: SHIPPED | OUTPATIENT
Start: 2024-10-03 | End: 2025-01-31

## 2024-10-03 RX ORDER — ZOLPIDEM TARTRATE 10 MG/1
10 TABLET ORAL NIGHTLY PRN
Qty: 30 TABLET | Refills: 2 | Status: SHIPPED | OUTPATIENT
Start: 2024-10-07 | End: 2025-01-05

## 2024-10-03 RX ORDER — BUSPIRONE HYDROCHLORIDE 15 MG/1
15 TABLET ORAL 2 TIMES DAILY
Qty: 60 TABLET | Refills: 3 | Status: SHIPPED | OUTPATIENT
Start: 2024-10-03 | End: 2025-01-31

## 2024-10-03 RX ORDER — DIVALPROEX SODIUM 250 MG/1
250 TABLET, FILM COATED, EXTENDED RELEASE ORAL
Qty: 30 TABLET | Refills: 3 | Status: SHIPPED | OUTPATIENT
Start: 2024-10-03 | End: 2025-01-31

## 2024-10-03 ASSESSMENT — PATIENT HEALTH QUESTIONNAIRE - PHQ9
3. TROUBLE FALLING OR STAYING ASLEEP: NOT AT ALL
SUM OF ALL RESPONSES TO PHQ QUESTIONS 1-9: 0
8. MOVING OR SPEAKING SO SLOWLY THAT OTHER PEOPLE COULD HAVE NOTICED. OR THE OPPOSITE, BEING SO FIGETY OR RESTLESS THAT YOU HAVE BEEN MOVING AROUND A LOT MORE THAN USUAL: NOT AT ALL
7. TROUBLE CONCENTRATING ON THINGS, SUCH AS READING THE NEWSPAPER OR WATCHING TELEVISION: NOT AT ALL
SUM OF ALL RESPONSES TO PHQ QUESTIONS 1-9: 0
10. IF YOU CHECKED OFF ANY PROBLEMS, HOW DIFFICULT HAVE THESE PROBLEMS MADE IT FOR YOU TO DO YOUR WORK, TAKE CARE OF THINGS AT HOME, OR GET ALONG WITH OTHER PEOPLE: NOT DIFFICULT AT ALL
SUM OF ALL RESPONSES TO PHQ9 QUESTIONS 1 & 2: 0
SUM OF ALL RESPONSES TO PHQ QUESTIONS 1-9: 0
5. POOR APPETITE OR OVEREATING: NOT AT ALL
SUM OF ALL RESPONSES TO PHQ QUESTIONS 1-9: 0
1. LITTLE INTEREST OR PLEASURE IN DOING THINGS: NOT AT ALL
4. FEELING TIRED OR HAVING LITTLE ENERGY: NOT AT ALL
2. FEELING DOWN, DEPRESSED OR HOPELESS: NOT AT ALL
9. THOUGHTS THAT YOU WOULD BE BETTER OFF DEAD, OR OF HURTING YOURSELF: NOT AT ALL
6. FEELING BAD ABOUT YOURSELF - OR THAT YOU ARE A FAILURE OR HAVE LET YOURSELF OR YOUR FAMILY DOWN: NOT AT ALL

## 2024-10-03 NOTE — PROGRESS NOTES
01/28/2022    Type 2 diabetes mellitus with peripheral vascular disease (HCC) 11/04/2019    no med at present time (02/2022) corrected with weight loss    Unspecified adverse effect of anesthesia     high anxiety after surgery, hyper       Vitals:  There were no vitals filed for this visit.    ROS:  Psychological ROS: positive for depression, irritability, and mood swings    Mental Status Exam:   Appearance  Appears stated age and Well-kept  Behavior  Cooperative  Psychomotor Activity within normal limits  Gait and Station Normal Laina and Station and Normal Balance  Speech   appropriate  Mood    is depressed and is irritable  Affect    irritable and depressed  Thought Process Circumstantial  Thought Content/Perceptual Disturbances free of delusions, free of hallucinations, and not internally preoccupied  Cognition/Sensorium Alert, Fully oriented, Normal concentration/ Attention, Recent memory intact, Remote memory intact, and Fund of knowledge adequate for level of education  Insight  fair  Judgment fair  Assessment:    Alyssa was seen today for follow-up.    Diagnoses and all orders for this visit:    Bipolar 1 disorder, depressed, partial remission (HCC)  -     divalproex (DEPAKOTE ER) 500 MG extended release tablet; Take 2 tablets by mouth at bedtime  -     divalproex (DEPAKOTE ER) 250 MG extended release tablet; Take 1 tablet by mouth nightly  -     buPROPion (WELLBUTRIN XL) 300 MG extended release tablet; Take 1 tablet by mouth every morning  -     cariprazine hcl (VRAYLAR) 1.5 MG capsule; Take 1 capsule by mouth daily    JUS (generalized anxiety disorder)  -     busPIRone (BUSPAR) 15 MG tablet; Take 15 mg by mouth 2 times daily  -     buPROPion (WELLBUTRIN XL) 300 MG extended release tablet; Take 1 tablet by mouth every morning    Insomnia, psychophysiological  -     zolpidem (AMBIEN) 10 MG tablet; Take 1 tablet by mouth nightly as needed for Sleep (insomnia) for up to 90 days. Max Daily Amount: 10 mg

## 2024-10-07 ENCOUNTER — TELEPHONE (OUTPATIENT)
Dept: BEHAVIORAL/MENTAL HEALTH CLINIC | Age: 49
End: 2024-10-07

## 2024-10-07 ENCOUNTER — HOSPITAL ENCOUNTER (EMERGENCY)
Age: 49
Discharge: HOME | End: 2024-10-07
Payer: MEDICARE

## 2024-10-07 VITALS
WEIGHT: 189 LBS | BODY MASS INDEX: 29.66 KG/M2 | OXYGEN SATURATION: 99 % | SYSTOLIC BLOOD PRESSURE: 124 MMHG | TEMPERATURE: 98.1 F | RESPIRATION RATE: 17 BRPM | DIASTOLIC BLOOD PRESSURE: 61 MMHG | HEART RATE: 61 BPM | HEIGHT: 67 IN

## 2024-10-07 DIAGNOSIS — G43.009 MIGRAINE WITHOUT AURA AND WITHOUT STATUS MIGRAINOSUS, NOT INTRACTABLE: Primary | ICD-10-CM

## 2024-10-07 PROCEDURE — 6360000002 HC RX W HCPCS

## 2024-10-07 PROCEDURE — 96366 THER/PROPH/DIAG IV INF ADDON: CPT

## 2024-10-07 PROCEDURE — 96365 THER/PROPH/DIAG IV INF INIT: CPT

## 2024-10-07 PROCEDURE — 96375 TX/PRO/DX INJ NEW DRUG ADDON: CPT

## 2024-10-07 PROCEDURE — 99284 EMERGENCY DEPT VISIT MOD MDM: CPT

## 2024-10-07 RX ORDER — PROCHLORPERAZINE EDISYLATE 5 MG/ML
10 INJECTION INTRAMUSCULAR; INTRAVENOUS
Status: COMPLETED | OUTPATIENT
Start: 2024-10-07 | End: 2024-10-07

## 2024-10-07 RX ORDER — DIPHENHYDRAMINE HYDROCHLORIDE 50 MG/ML
25 INJECTION INTRAMUSCULAR; INTRAVENOUS
Status: COMPLETED | OUTPATIENT
Start: 2024-10-07 | End: 2024-10-07

## 2024-10-07 RX ORDER — MAGNESIUM SULFATE IN WATER 40 MG/ML
2000 INJECTION, SOLUTION INTRAVENOUS ONCE
Status: COMPLETED | OUTPATIENT
Start: 2024-10-07 | End: 2024-10-07

## 2024-10-07 RX ORDER — ONDANSETRON 2 MG/ML
4 INJECTION INTRAMUSCULAR; INTRAVENOUS
Status: COMPLETED | OUTPATIENT
Start: 2024-10-07 | End: 2024-10-07

## 2024-10-07 RX ORDER — MORPHINE SULFATE 4 MG/ML
4 INJECTION, SOLUTION INTRAMUSCULAR; INTRAVENOUS
Status: COMPLETED | OUTPATIENT
Start: 2024-10-07 | End: 2024-10-07

## 2024-10-07 RX ORDER — KETOROLAC TROMETHAMINE 30 MG/ML
30 INJECTION, SOLUTION INTRAMUSCULAR; INTRAVENOUS ONCE
Status: COMPLETED | OUTPATIENT
Start: 2024-10-07 | End: 2024-10-07

## 2024-10-07 RX ADMIN — DIPHENHYDRAMINE HYDROCHLORIDE 25 MG: 50 INJECTION INTRAMUSCULAR; INTRAVENOUS at 14:30

## 2024-10-07 RX ADMIN — MORPHINE SULFATE 4 MG: 4 INJECTION INTRAVENOUS at 16:29

## 2024-10-07 RX ADMIN — MAGNESIUM SULFATE HEPTAHYDRATE 2000 MG: 40 INJECTION, SOLUTION INTRAVENOUS at 14:36

## 2024-10-07 RX ADMIN — PROCHLORPERAZINE EDISYLATE 10 MG: 5 INJECTION INTRAMUSCULAR; INTRAVENOUS at 16:29

## 2024-10-07 RX ADMIN — KETOROLAC TROMETHAMINE 30 MG: 30 INJECTION, SOLUTION INTRAMUSCULAR at 14:30

## 2024-10-07 RX ADMIN — ONDANSETRON 4 MG: 2 INJECTION INTRAMUSCULAR; INTRAVENOUS at 14:30

## 2024-10-07 ASSESSMENT — PAIN - FUNCTIONAL ASSESSMENT
PAIN_FUNCTIONAL_ASSESSMENT: 0-10
PAIN_FUNCTIONAL_ASSESSMENT: ACTIVITIES ARE NOT PREVENTED
PAIN_FUNCTIONAL_ASSESSMENT: 0-10

## 2024-10-07 ASSESSMENT — LIFESTYLE VARIABLES
HOW OFTEN DO YOU HAVE A DRINK CONTAINING ALCOHOL: NEVER
HOW MANY STANDARD DRINKS CONTAINING ALCOHOL DO YOU HAVE ON A TYPICAL DAY: PATIENT DOES NOT DRINK

## 2024-10-07 ASSESSMENT — PAIN SCALES - GENERAL
PAINLEVEL_OUTOF10: 9
PAINLEVEL_OUTOF10: 10
PAINLEVEL_OUTOF10: 9
PAINLEVEL_OUTOF10: 3
PAINLEVEL_OUTOF10: 7

## 2024-10-07 ASSESSMENT — PAIN DESCRIPTION - LOCATION: LOCATION: HEAD

## 2024-10-07 ASSESSMENT — PAIN DESCRIPTION - DESCRIPTORS: DESCRIPTORS: THROBBING

## 2024-10-07 NOTE — ED TRIAGE NOTES
Patient ambulatory to triage with complaint of migraine for 3 days. Has hx of migraine but this worse. Denies weakness/tingling. Endorses blurred vision

## 2024-10-07 NOTE — DISCHARGE INSTRUCTIONS
As we discussed I do want you to follow-up with your neurologist for further recommendations including medication management.  You may also follow-up with your primary care physician in 1 to 2 weeks for recheck and to receive further recommendations there as well.  As we discussed, please return to the emergency department for any new, worsening, concerning symptoms.

## 2024-10-07 NOTE — ED PROVIDER NOTES
Emergency Department Provider Note       PCP: Carlee Medrano, APRN - CNP   Age: 48 y.o.   Sex: female     DISPOSITION Decision To Discharge 10/07/2024 04:56:13 PM  Condition at Disposition: Data Unavailable       ICD-10-CM    1. Migraine without aura and without status migrainosus, not intractable  G43.009           Medical Decision Making     In summary's is a well-appearing nontoxic 48-year-old female arriving for complaints of migraine that has been ongoing for the past couple days.  Patient reports she has been taking her medication with no relief.  She reports here with generalized headache with some intermittent blurred vision.  Intermittent nausea without vomiting.  No other symptoms reported.  Normal neurological exam otherwise.  Had full resolution of migrainous symptoms with medications given here.  Have instructed her to follow-up with neurology given her history for further recommendations including medication management.  She is in agreement with this plan.  Patient has her  driving her home today.  Very strict return precautions were discussed with patient which she verbalized understanding.  Broad differentials were considered during ED course today.  ED Course as of 10/07/24 1656   Mon Oct 07, 2024   1616 Patient reports pain now 6 out of 10.  Will order more medication for pain and nausea at this time.  Will reassess.  Heart rate already down to 78 normalized BP. [TC]   1648 On reassessment, patient is currently asleep with respirations present even and nonlabored vital signs have continued to improve status post medications. [TC]   1653 Patient reports to me that headache is gone at this time.  Will discharge her and have her follow-up in neurology which she is already established with. [TC]      ED Course User Index  [TC] Otto Dawson, ERIK - NP     1 chronic illness with exacerbation.  Patient was discharged risks and benefits of hospitalization were considered.  Shared medical decision

## 2024-10-08 ENCOUNTER — CARE COORDINATION (OUTPATIENT)
Dept: CARE COORDINATION | Facility: CLINIC | Age: 49
End: 2024-10-08

## 2024-10-08 NOTE — CARE COORDINATION
Ambulatory Care Management Outreach Attempt    This patient was received as a referral from  Daily assignment for case management of ed utilizer.    Attempted to reach patient for ED follow up. Pt has respectfully declined services at this time, my contact information has been shared with pt in the event there circumstances change. They are free to reach out at any time. ACM signing off.        Patient: Alyssa Lorenzana Patient : 1975 MRN: [id number]    Last Discharge Facility       Date Complaint Diagnosis Description Type Department Provider    10/7/24 Headache Migraine without aura and without status migrainosus, not intractable ED (DISCHARGE) [facility]                 Noted following upcoming appointments from discharge chart review:   BS follow up appointment(s):   Future Appointments   Date Time Provider Department Center   10/25/2024  8:00 AM Carlee Medrano APRN - CNP [facility] DE[facility]   2024  8:45 AM PAIN MGMT PROVIDER [facility]   2024  8:00 AM Ольга Ceja APRN - CNP [facility]   2024 11:00 AM Cheryl Hatfield PA PM [facility]   2024  8:30 AM Kenyetta Delarosa MD [facility]   2025 11:30 AM Carlee Medrano APRN - CNP [facility]   2025  9:30 AM Mercedes Kilgore APRN - NP [facility]   2025 10:20 AM Rosalina Singer APRN - CNP [facility]   2025 10:30 AM Julio Glass DO [facility]     Non-BS follow up appointment(s):      Detail Level: Detailed

## 2024-10-11 ENCOUNTER — CLINICAL DOCUMENTATION (OUTPATIENT)
Dept: NEUROLOGY | Age: 49
End: 2024-10-11

## 2024-10-11 DIAGNOSIS — G43.901 STATUS MIGRAINOSUS: Primary | ICD-10-CM

## 2024-10-11 RX ORDER — EPINEPHRINE 1 MG/ML
0.3 INJECTION, SOLUTION, CONCENTRATE INTRAVENOUS PRN
OUTPATIENT
Start: 2024-10-11

## 2024-10-11 RX ORDER — ALBUTEROL SULFATE 90 UG/1
4 INHALANT RESPIRATORY (INHALATION) PRN
OUTPATIENT
Start: 2024-10-11

## 2024-10-11 RX ORDER — ACETAMINOPHEN 325 MG/1
650 TABLET ORAL
OUTPATIENT
Start: 2024-10-11

## 2024-10-11 RX ORDER — SODIUM CHLORIDE 9 MG/ML
INJECTION, SOLUTION INTRAVENOUS CONTINUOUS
OUTPATIENT
Start: 2024-10-11

## 2024-10-11 RX ORDER — DIPHENHYDRAMINE HYDROCHLORIDE 50 MG/ML
50 INJECTION INTRAMUSCULAR; INTRAVENOUS
OUTPATIENT
Start: 2024-10-11

## 2024-10-11 RX ORDER — MAGNESIUM SULFATE IN WATER 40 MG/ML
2000 INJECTION, SOLUTION INTRAVENOUS ONCE
OUTPATIENT
Start: 2024-10-11

## 2024-10-11 RX ORDER — HEPARIN 100 UNIT/ML
500 SYRINGE INTRAVENOUS PRN
OUTPATIENT
Start: 2024-10-11

## 2024-10-11 RX ORDER — 0.9 % SODIUM CHLORIDE 0.9 %
1000 INTRAVENOUS SOLUTION INTRAVENOUS ONCE
OUTPATIENT
Start: 2024-10-11 | End: 2024-10-11

## 2024-10-11 RX ORDER — KETOROLAC TROMETHAMINE 30 MG/ML
60 INJECTION, SOLUTION INTRAMUSCULAR; INTRAVENOUS ONCE
OUTPATIENT
Start: 2024-10-11 | End: 2024-10-11

## 2024-10-11 RX ORDER — SODIUM CHLORIDE 9 MG/ML
5-250 INJECTION, SOLUTION INTRAVENOUS PRN
OUTPATIENT
Start: 2024-10-11

## 2024-10-11 RX ORDER — SODIUM CHLORIDE 0.9 % (FLUSH) 0.9 %
5-40 SYRINGE (ML) INJECTION PRN
OUTPATIENT
Start: 2024-10-11

## 2024-10-11 RX ORDER — ONDANSETRON 2 MG/ML
8 INJECTION INTRAMUSCULAR; INTRAVENOUS
OUTPATIENT
Start: 2024-10-11

## 2024-10-11 NOTE — PROGRESS NOTES
Patient went to the emergency room 10-7-2024 with a severe headache they did evaluate her and gave her cocktail and sent her on her way she woke up this morning similar.  The only thing I could offer would be going back to the emergency room or IV infusions at the infusion center two days

## 2024-10-14 ENCOUNTER — TELEPHONE (OUTPATIENT)
Dept: BEHAVIORAL/MENTAL HEALTH CLINIC | Age: 49
End: 2024-10-14

## 2024-10-15 ENCOUNTER — NURSE ONLY (OUTPATIENT)
Age: 49
End: 2024-10-15
Payer: MEDICARE

## 2024-10-15 VITALS
OXYGEN SATURATION: 96 % | HEART RATE: 72 BPM | DIASTOLIC BLOOD PRESSURE: 92 MMHG | TEMPERATURE: 97.7 F | RESPIRATION RATE: 16 BRPM | SYSTOLIC BLOOD PRESSURE: 157 MMHG

## 2024-10-15 DIAGNOSIS — G43.901 STATUS MIGRAINOSUS: Primary | ICD-10-CM

## 2024-10-15 PROCEDURE — 96366 THER/PROPH/DIAG IV INF ADDON: CPT | Performed by: PSYCHIATRY & NEUROLOGY

## 2024-10-15 PROCEDURE — 96372 THER/PROPH/DIAG INJ SC/IM: CPT | Performed by: PSYCHIATRY & NEUROLOGY

## 2024-10-15 PROCEDURE — 96375 TX/PRO/DX INJ NEW DRUG ADDON: CPT | Performed by: PSYCHIATRY & NEUROLOGY

## 2024-10-15 PROCEDURE — 96365 THER/PROPH/DIAG IV INF INIT: CPT | Performed by: PSYCHIATRY & NEUROLOGY

## 2024-10-15 PROCEDURE — 96367 TX/PROPH/DG ADDL SEQ IV INF: CPT | Performed by: PSYCHIATRY & NEUROLOGY

## 2024-10-15 RX ORDER — ALBUTEROL SULFATE 90 UG/1
4 INHALANT RESPIRATORY (INHALATION) PRN
Status: CANCELLED | OUTPATIENT
Start: 2024-10-29

## 2024-10-15 RX ORDER — HEPARIN 100 UNIT/ML
500 SYRINGE INTRAVENOUS PRN
Status: DISCONTINUED | OUTPATIENT
Start: 2024-10-15 | End: 2024-10-15 | Stop reason: HOSPADM

## 2024-10-15 RX ORDER — MAGNESIUM SULFATE IN WATER 40 MG/ML
2000 INJECTION, SOLUTION INTRAVENOUS ONCE
Status: CANCELLED | OUTPATIENT
Start: 2024-10-29

## 2024-10-15 RX ORDER — EPINEPHRINE 1 MG/ML
0.3 INJECTION, SOLUTION, CONCENTRATE INTRAVENOUS PRN
Status: CANCELLED | OUTPATIENT
Start: 2024-10-29

## 2024-10-15 RX ORDER — 0.9 % SODIUM CHLORIDE 0.9 %
1000 INTRAVENOUS SOLUTION INTRAVENOUS ONCE
Status: COMPLETED | OUTPATIENT
Start: 2024-10-15 | End: 2024-10-15

## 2024-10-15 RX ORDER — SODIUM CHLORIDE 9 MG/ML
INJECTION, SOLUTION INTRAVENOUS CONTINUOUS
Status: DISCONTINUED | OUTPATIENT
Start: 2024-10-15 | End: 2024-10-15 | Stop reason: HOSPADM

## 2024-10-15 RX ORDER — SODIUM CHLORIDE 9 MG/ML
5-250 INJECTION, SOLUTION INTRAVENOUS PRN
Status: CANCELLED | OUTPATIENT
Start: 2024-10-29

## 2024-10-15 RX ORDER — SODIUM CHLORIDE 9 MG/ML
INJECTION, SOLUTION INTRAVENOUS CONTINUOUS
Status: CANCELLED | OUTPATIENT
Start: 2024-10-29

## 2024-10-15 RX ORDER — ALBUTEROL SULFATE 90 UG/1
4 INHALANT RESPIRATORY (INHALATION) PRN
Status: DISCONTINUED | OUTPATIENT
Start: 2024-10-15 | End: 2024-10-15 | Stop reason: HOSPADM

## 2024-10-15 RX ORDER — DIPHENHYDRAMINE HYDROCHLORIDE 50 MG/ML
50 INJECTION INTRAMUSCULAR; INTRAVENOUS
Status: DISCONTINUED | OUTPATIENT
Start: 2024-10-15 | End: 2024-10-15 | Stop reason: HOSPADM

## 2024-10-15 RX ORDER — ONDANSETRON 2 MG/ML
8 INJECTION INTRAMUSCULAR; INTRAVENOUS
Status: DISCONTINUED | OUTPATIENT
Start: 2024-10-15 | End: 2024-10-15 | Stop reason: HOSPADM

## 2024-10-15 RX ORDER — MAGNESIUM SULFATE IN WATER 40 MG/ML
2000 INJECTION, SOLUTION INTRAVENOUS ONCE
Status: COMPLETED | OUTPATIENT
Start: 2024-10-15 | End: 2024-10-15

## 2024-10-15 RX ORDER — SODIUM CHLORIDE 0.9 % (FLUSH) 0.9 %
5-40 SYRINGE (ML) INJECTION PRN
Status: DISCONTINUED | OUTPATIENT
Start: 2024-10-15 | End: 2024-10-15 | Stop reason: HOSPADM

## 2024-10-15 RX ORDER — ACETAMINOPHEN 325 MG/1
650 TABLET ORAL
Status: CANCELLED | OUTPATIENT
Start: 2024-10-29

## 2024-10-15 RX ORDER — EPINEPHRINE 1 MG/ML
0.3 INJECTION, SOLUTION, CONCENTRATE INTRAVENOUS PRN
Status: DISCONTINUED | OUTPATIENT
Start: 2024-10-15 | End: 2024-10-15 | Stop reason: HOSPADM

## 2024-10-15 RX ORDER — DIPHENHYDRAMINE HYDROCHLORIDE 50 MG/ML
50 INJECTION INTRAMUSCULAR; INTRAVENOUS
Status: CANCELLED | OUTPATIENT
Start: 2024-10-29

## 2024-10-15 RX ORDER — SODIUM CHLORIDE 9 MG/ML
5-250 INJECTION, SOLUTION INTRAVENOUS PRN
Status: DISCONTINUED | OUTPATIENT
Start: 2024-10-15 | End: 2024-10-15 | Stop reason: HOSPADM

## 2024-10-15 RX ORDER — KETOROLAC TROMETHAMINE 30 MG/ML
60 INJECTION, SOLUTION INTRAMUSCULAR; INTRAVENOUS ONCE
Status: CANCELLED | OUTPATIENT
Start: 2024-10-29 | End: 2024-10-29

## 2024-10-15 RX ORDER — 0.9 % SODIUM CHLORIDE 0.9 %
1000 INTRAVENOUS SOLUTION INTRAVENOUS ONCE
Status: CANCELLED | OUTPATIENT
Start: 2024-10-29 | End: 2024-10-29

## 2024-10-15 RX ORDER — ACETAMINOPHEN 325 MG/1
650 TABLET ORAL
Status: DISCONTINUED | OUTPATIENT
Start: 2024-10-15 | End: 2024-10-15 | Stop reason: HOSPADM

## 2024-10-15 RX ORDER — ONDANSETRON 2 MG/ML
8 INJECTION INTRAMUSCULAR; INTRAVENOUS
Status: CANCELLED | OUTPATIENT
Start: 2024-10-29

## 2024-10-15 RX ORDER — KETOROLAC TROMETHAMINE 30 MG/ML
60 INJECTION, SOLUTION INTRAMUSCULAR; INTRAVENOUS ONCE
Status: COMPLETED | OUTPATIENT
Start: 2024-10-15 | End: 2024-10-15

## 2024-10-15 RX ORDER — HEPARIN 100 UNIT/ML
500 SYRINGE INTRAVENOUS PRN
Status: CANCELLED | OUTPATIENT
Start: 2024-10-29

## 2024-10-15 RX ORDER — SODIUM CHLORIDE 0.9 % (FLUSH) 0.9 %
5-40 SYRINGE (ML) INJECTION PRN
Status: CANCELLED | OUTPATIENT
Start: 2024-10-29

## 2024-10-15 RX ADMIN — Medication 1000 ML: at 08:33

## 2024-10-15 RX ADMIN — MAGNESIUM SULFATE IN WATER 2000 MG: 40 INJECTION, SOLUTION INTRAVENOUS at 09:04

## 2024-10-15 RX ADMIN — KETOROLAC TROMETHAMINE 60 MG: 30 INJECTION, SOLUTION INTRAMUSCULAR; INTRAVENOUS at 08:42

## 2024-10-15 ASSESSMENT — PAIN SCALES - GENERAL
PAINLEVEL_OUTOF10: 8
PAINLEVEL_OUTOF10: 2

## 2024-10-15 ASSESSMENT — PAIN DESCRIPTION - LOCATION
LOCATION: HEAD
LOCATION: HEAD

## 2024-10-15 NOTE — PROGRESS NOTES
[facility]  [address], [address]  [place] [address]  Office : [phone], Fax: [phone]       Patient arrived ambulatory to the infusion suite today for Migraine Therapy medications. Vital Signs WNL. Migraine upon arrival with a pain of 8/10. Patient offered blankets and pillow for comfort. Pt up ad maxim to BR; offered drink and snacks.    20g 1\" IV catheter placed in the right posterior hand x1 attempt(s); flushed with 10ml NS. Patient tolerated well.   5L Oxygen therapy provided for 15 minutes per provider order.  Migraine medications administered per orders and per protocol.   Patient tolerated the infusion well, no complications noted.     Post infusion vital signs WNL. PIV flushed with 10ml NS and removed without difficulty, catheter intact; dressing applied. Patient instructed to leave the dressing on for at least 30 minutes before removal.       Patient ambulatory with steady gait out of infusion suite, discharged feeling well without complications. Pain 2/10 scale on discharge. Patient instructed to call the ordering provider with any post-infusion issues.    Next appointment scheduled with appointment desk prior to patient's departure.

## 2024-10-16 ENCOUNTER — NURSE ONLY (OUTPATIENT)
Age: 49
End: 2024-10-16
Payer: MEDICARE

## 2024-10-16 VITALS
HEART RATE: 85 BPM | RESPIRATION RATE: 16 BRPM | TEMPERATURE: 97.2 F | SYSTOLIC BLOOD PRESSURE: 164 MMHG | DIASTOLIC BLOOD PRESSURE: 93 MMHG

## 2024-10-16 DIAGNOSIS — G43.901 STATUS MIGRAINOSUS: Primary | ICD-10-CM

## 2024-10-16 PROCEDURE — 96375 TX/PRO/DX INJ NEW DRUG ADDON: CPT | Performed by: PSYCHIATRY & NEUROLOGY

## 2024-10-16 PROCEDURE — 96372 THER/PROPH/DIAG INJ SC/IM: CPT | Performed by: PSYCHIATRY & NEUROLOGY

## 2024-10-16 PROCEDURE — 96366 THER/PROPH/DIAG IV INF ADDON: CPT | Performed by: PSYCHIATRY & NEUROLOGY

## 2024-10-16 PROCEDURE — 96367 TX/PROPH/DG ADDL SEQ IV INF: CPT | Performed by: PSYCHIATRY & NEUROLOGY

## 2024-10-16 PROCEDURE — 96365 THER/PROPH/DIAG IV INF INIT: CPT | Performed by: PSYCHIATRY & NEUROLOGY

## 2024-10-16 RX ORDER — SODIUM CHLORIDE 9 MG/ML
INJECTION, SOLUTION INTRAVENOUS CONTINUOUS
OUTPATIENT
Start: 2024-10-29

## 2024-10-16 RX ORDER — SODIUM CHLORIDE 0.9 % (FLUSH) 0.9 %
5-40 SYRINGE (ML) INJECTION PRN
Status: DISCONTINUED | OUTPATIENT
Start: 2024-10-16 | End: 2024-10-16 | Stop reason: HOSPADM

## 2024-10-16 RX ORDER — ACETAMINOPHEN 325 MG/1
650 TABLET ORAL
OUTPATIENT
Start: 2024-10-29

## 2024-10-16 RX ORDER — MAGNESIUM SULFATE IN WATER 40 MG/ML
2000 INJECTION, SOLUTION INTRAVENOUS ONCE
Status: COMPLETED | OUTPATIENT
Start: 2024-10-16 | End: 2024-10-16

## 2024-10-16 RX ORDER — 0.9 % SODIUM CHLORIDE 0.9 %
1000 INTRAVENOUS SOLUTION INTRAVENOUS ONCE
Status: COMPLETED | OUTPATIENT
Start: 2024-10-16 | End: 2024-10-16

## 2024-10-16 RX ORDER — SODIUM CHLORIDE 9 MG/ML
INJECTION, SOLUTION INTRAVENOUS CONTINUOUS
Status: DISCONTINUED | OUTPATIENT
Start: 2024-10-16 | End: 2024-10-16 | Stop reason: HOSPADM

## 2024-10-16 RX ORDER — SODIUM CHLORIDE 0.9 % (FLUSH) 0.9 %
5-40 SYRINGE (ML) INJECTION PRN
OUTPATIENT
Start: 2024-10-29

## 2024-10-16 RX ORDER — SODIUM CHLORIDE 9 MG/ML
5-250 INJECTION, SOLUTION INTRAVENOUS PRN
Status: DISCONTINUED | OUTPATIENT
Start: 2024-10-16 | End: 2024-10-16 | Stop reason: HOSPADM

## 2024-10-16 RX ORDER — ONDANSETRON 2 MG/ML
8 INJECTION INTRAMUSCULAR; INTRAVENOUS
OUTPATIENT
Start: 2024-10-29

## 2024-10-16 RX ORDER — SODIUM CHLORIDE 9 MG/ML
5-250 INJECTION, SOLUTION INTRAVENOUS PRN
OUTPATIENT
Start: 2024-10-29

## 2024-10-16 RX ORDER — HEPARIN 100 UNIT/ML
500 SYRINGE INTRAVENOUS PRN
OUTPATIENT
Start: 2024-10-29

## 2024-10-16 RX ORDER — 0.9 % SODIUM CHLORIDE 0.9 %
1000 INTRAVENOUS SOLUTION INTRAVENOUS ONCE
OUTPATIENT
Start: 2024-10-29 | End: 2024-10-29

## 2024-10-16 RX ORDER — EPINEPHRINE 1 MG/ML
0.3 INJECTION, SOLUTION, CONCENTRATE INTRAVENOUS PRN
Status: DISCONTINUED | OUTPATIENT
Start: 2024-10-16 | End: 2024-10-16 | Stop reason: HOSPADM

## 2024-10-16 RX ORDER — ACETAMINOPHEN 325 MG/1
650 TABLET ORAL
Status: DISCONTINUED | OUTPATIENT
Start: 2024-10-16 | End: 2024-10-16 | Stop reason: HOSPADM

## 2024-10-16 RX ORDER — KETOROLAC TROMETHAMINE 30 MG/ML
60 INJECTION, SOLUTION INTRAMUSCULAR; INTRAVENOUS ONCE
Status: COMPLETED | OUTPATIENT
Start: 2024-10-16 | End: 2024-10-16

## 2024-10-16 RX ORDER — KETOROLAC TROMETHAMINE 30 MG/ML
60 INJECTION, SOLUTION INTRAMUSCULAR; INTRAVENOUS ONCE
Status: CANCELLED | OUTPATIENT
Start: 2024-10-29 | End: 2024-10-29

## 2024-10-16 RX ORDER — ONDANSETRON 2 MG/ML
8 INJECTION INTRAMUSCULAR; INTRAVENOUS
Status: DISCONTINUED | OUTPATIENT
Start: 2024-10-16 | End: 2024-10-16 | Stop reason: HOSPADM

## 2024-10-16 RX ORDER — ALBUTEROL SULFATE 90 UG/1
4 INHALANT RESPIRATORY (INHALATION) PRN
OUTPATIENT
Start: 2024-10-29

## 2024-10-16 RX ORDER — EPINEPHRINE 1 MG/ML
0.3 INJECTION, SOLUTION, CONCENTRATE INTRAVENOUS PRN
OUTPATIENT
Start: 2024-10-29

## 2024-10-16 RX ORDER — ALBUTEROL SULFATE 90 UG/1
4 INHALANT RESPIRATORY (INHALATION) PRN
Status: DISCONTINUED | OUTPATIENT
Start: 2024-10-16 | End: 2024-10-16 | Stop reason: HOSPADM

## 2024-10-16 RX ORDER — DIPHENHYDRAMINE HYDROCHLORIDE 50 MG/ML
50 INJECTION INTRAMUSCULAR; INTRAVENOUS
Status: DISCONTINUED | OUTPATIENT
Start: 2024-10-16 | End: 2024-10-16 | Stop reason: HOSPADM

## 2024-10-16 RX ORDER — HEPARIN 100 UNIT/ML
500 SYRINGE INTRAVENOUS PRN
Status: DISCONTINUED | OUTPATIENT
Start: 2024-10-16 | End: 2024-10-16 | Stop reason: HOSPADM

## 2024-10-16 RX ORDER — DIPHENHYDRAMINE HYDROCHLORIDE 50 MG/ML
50 INJECTION INTRAMUSCULAR; INTRAVENOUS
OUTPATIENT
Start: 2024-10-29

## 2024-10-16 RX ORDER — MAGNESIUM SULFATE IN WATER 40 MG/ML
2000 INJECTION, SOLUTION INTRAVENOUS ONCE
Status: CANCELLED | OUTPATIENT
Start: 2024-10-29

## 2024-10-16 RX ADMIN — KETOROLAC TROMETHAMINE 60 MG: 30 INJECTION, SOLUTION INTRAMUSCULAR; INTRAVENOUS at 09:13

## 2024-10-16 RX ADMIN — Medication 1000 ML: at 09:00

## 2024-10-16 RX ADMIN — MAGNESIUM SULFATE IN WATER 2000 MG: 40 INJECTION, SOLUTION INTRAVENOUS at 09:37

## 2024-10-16 ASSESSMENT — PAIN - FUNCTIONAL ASSESSMENT: PAIN_FUNCTIONAL_ASSESSMENT: PREVENTS OR INTERFERES SOME ACTIVE ACTIVITIES AND ADLS

## 2024-10-16 ASSESSMENT — PAIN DESCRIPTION - DESCRIPTORS: DESCRIPTORS: SHARP;NAGGING

## 2024-10-16 ASSESSMENT — PAIN SCALES - GENERAL: PAINLEVEL_OUTOF10: 6

## 2024-10-16 ASSESSMENT — PAIN DESCRIPTION - LOCATION: LOCATION: HEAD

## 2024-10-16 NOTE — PROGRESS NOTES
[facility]  [address], [address]  [place] [address]  Office : [phone], Fax: [phone]       Patient arrived ambulatory to the infusion suite today for Migraine Therapy medications. Vital Signs WNL. Pain 6/10 scale for migraine upon arrival. Patient offered blankets and pillow for comfort. Pt up ad maxim to BR; offered drink and snacks.    20g 1\" IV catheter placed in the left posterior hand x1 attempt(s); flushed with 10ml NS. Patient tolerated well.   5L Oxygen therapy provided for 15 minutes per provider order.  Migraine medications administered per orders and per protocol.   Patient tolerated the infusion well, no complications noted.     Post infusion vital signs WNL. PIV flushed with 10ml NS and removed without difficulty, catheter intact; dressing applied. Patient instructed to leave the dressing on for at least 30 minutes before removal.       Patient ambulatory with steady gait out of infusion suite, discharged feeling well without complications. Pain 1/10 scale on discharge. Patient instructed to call the ordering provider with any post-infusion issues.    Next appointment scheduled with appointment desk prior to patient's departure.

## 2024-10-25 ENCOUNTER — OFFICE VISIT (OUTPATIENT)
Dept: FAMILY MEDICINE CLINIC | Facility: CLINIC | Age: 49
End: 2024-10-25

## 2024-10-25 VITALS
SYSTOLIC BLOOD PRESSURE: 136 MMHG | TEMPERATURE: 98.4 F | HEART RATE: 80 BPM | DIASTOLIC BLOOD PRESSURE: 82 MMHG | WEIGHT: 192 LBS | OXYGEN SATURATION: 98 % | BODY MASS INDEX: 30.07 KG/M2

## 2024-10-25 DIAGNOSIS — I10 ESSENTIAL HYPERTENSION: Primary | ICD-10-CM

## 2024-10-25 DIAGNOSIS — F31.75 BIPOLAR 1 DISORDER, DEPRESSED, PARTIAL REMISSION (HCC): ICD-10-CM

## 2024-10-25 DIAGNOSIS — G43.901 STATUS MIGRAINOSUS: ICD-10-CM

## 2024-10-25 DIAGNOSIS — Z79.899 MEDICATION MANAGEMENT: ICD-10-CM

## 2024-10-25 DIAGNOSIS — K76.0 FATTY LIVER: ICD-10-CM

## 2024-10-25 DIAGNOSIS — I10 ESSENTIAL HYPERTENSION: ICD-10-CM

## 2024-10-25 DIAGNOSIS — E66.3 OVERWEIGHT (BMI 25.0-29.9): ICD-10-CM

## 2024-10-25 DIAGNOSIS — K29.70 GASTRITIS WITHOUT BLEEDING, UNSPECIFIED CHRONICITY, UNSPECIFIED GASTRITIS TYPE: ICD-10-CM

## 2024-10-25 DIAGNOSIS — G25.81 RESTLESS LEGS SYNDROME (RLS): ICD-10-CM

## 2024-10-25 DIAGNOSIS — F41.9 ANXIETY: ICD-10-CM

## 2024-10-25 DIAGNOSIS — E78.00 ELEVATED LDL CHOLESTEROL LEVEL: ICD-10-CM

## 2024-10-25 DIAGNOSIS — R25.1 TREMORS OF NERVOUS SYSTEM: ICD-10-CM

## 2024-10-25 DIAGNOSIS — M46.1 SACROILIITIS, NOT ELSEWHERE CLASSIFIED (HCC): ICD-10-CM

## 2024-10-25 DIAGNOSIS — Z12.4 ENCOUNTER FOR PAPANICOLAOU SMEAR FOR CERVICAL CANCER SCREENING: ICD-10-CM

## 2024-10-25 DIAGNOSIS — D69.6 THROMBOCYTOPENIA (HCC): ICD-10-CM

## 2024-10-25 DIAGNOSIS — F17.210 SMOKING GREATER THAN 40 PACK YEARS: ICD-10-CM

## 2024-10-25 DIAGNOSIS — R09.89 DECREASED PEDAL PULSES: ICD-10-CM

## 2024-10-25 DIAGNOSIS — R91.1 RIGHT UPPER LOBE PULMONARY NODULE: ICD-10-CM

## 2024-10-25 DIAGNOSIS — E11.51 TYPE 2 DIABETES MELLITUS WITH DIABETIC PERIPHERAL ANGIOPATHY WITHOUT GANGRENE, WITHOUT LONG-TERM CURRENT USE OF INSULIN (HCC): ICD-10-CM

## 2024-10-25 DIAGNOSIS — K26.9 DUODENAL ULCER: ICD-10-CM

## 2024-10-25 DIAGNOSIS — R23.2 HOT FLASHES: ICD-10-CM

## 2024-10-25 LAB
ALBUMIN SERPL-MCNC: 3.6 G/DL (ref 3.5–5)
ALBUMIN/GLOB SERPL: 1.3 (ref 1–1.9)
ALP SERPL-CCNC: 127 U/L (ref 35–104)
ALT SERPL-CCNC: 16 U/L (ref 8–45)
ANION GAP SERPL CALC-SCNC: 13 MMOL/L (ref 9–18)
AST SERPL-CCNC: 20 U/L (ref 15–37)
BASOPHILS # BLD: 0 K/UL (ref 0–0.2)
BASOPHILS NFR BLD: 1 % (ref 0–2)
BILIRUB SERPL-MCNC: 0.2 MG/DL (ref 0–1.2)
BUN SERPL-MCNC: 20 MG/DL (ref 6–23)
CALCIUM SERPL-MCNC: 9.5 MG/DL (ref 8.8–10.2)
CHLORIDE SERPL-SCNC: 104 MMOL/L (ref 98–107)
CO2 SERPL-SCNC: 26 MMOL/L (ref 20–28)
CREAT SERPL-MCNC: 0.95 MG/DL (ref 0.6–1.1)
DIFFERENTIAL METHOD BLD: ABNORMAL
EOSINOPHIL # BLD: 0.1 K/UL (ref 0–0.8)
EOSINOPHIL NFR BLD: 2 % (ref 0.5–7.8)
ERYTHROCYTE [DISTWIDTH] IN BLOOD BY AUTOMATED COUNT: 13.7 % (ref 11.9–14.6)
GLOBULIN SER CALC-MCNC: 2.9 G/DL (ref 2.3–3.5)
GLUCOSE SERPL-MCNC: 189 MG/DL (ref 70–99)
HBA1C MFR BLD: 6.7 %
HCT VFR BLD AUTO: 42.2 % (ref 35.8–46.3)
HGB BLD-MCNC: 13.6 G/DL (ref 11.7–15.4)
IMM GRANULOCYTES # BLD AUTO: 0 K/UL (ref 0–0.5)
IMM GRANULOCYTES NFR BLD AUTO: 1 % (ref 0–5)
LYMPHOCYTES # BLD: 1.4 K/UL (ref 0.5–4.6)
LYMPHOCYTES NFR BLD: 24 % (ref 13–44)
MAGNESIUM SERPL-MCNC: 2.1 MG/DL (ref 1.8–2.4)
MCH RBC QN AUTO: 32.2 PG (ref 26.1–32.9)
MCHC RBC AUTO-ENTMCNC: 32.2 G/DL (ref 31.4–35)
MCV RBC AUTO: 99.8 FL (ref 82–102)
MONOCYTES # BLD: 0.5 K/UL (ref 0.1–1.3)
MONOCYTES NFR BLD: 8 % (ref 4–12)
NEUTS SEG # BLD: 3.9 K/UL (ref 1.7–8.2)
NEUTS SEG NFR BLD: 64 % (ref 43–78)
NRBC # BLD: 0 K/UL (ref 0–0.2)
PLATELET # BLD AUTO: 120 K/UL (ref 150–450)
PMV BLD AUTO: 12.3 FL (ref 9.4–12.3)
POTASSIUM SERPL-SCNC: 4.4 MMOL/L (ref 3.5–5.1)
PROT SERPL-MCNC: 6.5 G/DL (ref 6.3–8.2)
RBC # BLD AUTO: 4.23 M/UL (ref 4.05–5.2)
SODIUM SERPL-SCNC: 143 MMOL/L (ref 136–145)
VALPROATE SERPL-MCNC: 44 UG/ML (ref 50–100)
WBC # BLD AUTO: 6 K/UL (ref 4.3–11.1)

## 2024-10-25 RX ORDER — LORAZEPAM 0.5 MG/1
0.5 TABLET ORAL
Qty: 1 TABLET | Refills: 1 | Status: SHIPPED | OUTPATIENT
Start: 2024-10-25 | End: 2024-10-25

## 2024-10-25 RX ORDER — LISINOPRIL 20 MG/1
20 TABLET ORAL DAILY
Qty: 90 TABLET | Refills: 1 | Status: SHIPPED | OUTPATIENT
Start: 2024-10-25

## 2024-10-25 ASSESSMENT — ENCOUNTER SYMPTOMS
EYES NEGATIVE: 1
BACK PAIN: 1
RESPIRATORY NEGATIVE: 1
COLOR CHANGE: 1
GASTROINTESTINAL NEGATIVE: 1

## 2024-10-25 NOTE — PROGRESS NOTES
Alyssa Lorenzana is a 48 y.o. female who presents today for the following:  Chief Complaint   Patient presents with    Follow-up     Pt presents today for FU. Pt has been having episodes of uncontrollable shaking 3-4 x month.        Allergies   Allergen Reactions    Demerol Hcl [Meperidine] Other (See Comments)     Increased agitation and confusion, restlessness       Current Outpatient Medications   Medication Sig Dispense Refill    lisinopril (PRINIVIL;ZESTRIL) 20 MG tablet Take 1 tablet by mouth daily 90 tablet 1    LORazepam (ATIVAN) 0.5 MG tablet Take 1 tablet by mouth once as needed for Anxiety (take 30 minutes prior to procedures; no driving or operating hazardous machinery) for up to 1 dose. Max Daily Amount: 0.5 mg 1 tablet 1    divalproex (DEPAKOTE ER) 500 MG extended release tablet Take 2 tablets by mouth at bedtime 60 tablet 3    busPIRone (BUSPAR) 15 MG tablet Take 15 mg by mouth 2 times daily 60 tablet 3    divalproex (DEPAKOTE ER) 250 MG extended release tablet Take 1 tablet by mouth nightly 30 tablet 3    buPROPion (WELLBUTRIN XL) 300 MG extended release tablet Take 1 tablet by mouth every morning 30 tablet 3    zolpidem (AMBIEN) 10 MG tablet Take 1 tablet by mouth nightly as needed for Sleep (insomnia) for up to 90 days. Max Daily Amount: 10 mg 30 tablet 2    cariprazine hcl (VRAYLAR) 1.5 MG capsule Take 1 capsule by mouth daily 30 capsule 3    baclofen (LIORESAL) 10 MG tablet Take 1 tablet by mouth 3 times daily 90 tablet 1    Ubrogepant (UBRELVY) 100 MG TABS Take 1 at the onset of your headache and repeat in 2 hours as needed headache 12 tablet 11    fluticasone (FLONASE) 50 MCG/ACT nasal spray 2 sprays by Each Nostril route daily 16 g 11    omeprazole (PRILOSEC) 40 MG delayed release capsule Take 1 capsule by mouth daily 90 capsule 2    cloNIDine (CATAPRES) 0.1 MG tablet Take 1 tablet by mouth 2 times daily 60 tablet 8    atorvastatin (LIPITOR) 10 MG tablet Take 1 tablet by mouth

## 2024-10-30 ENCOUNTER — TELEPHONE (OUTPATIENT)
Dept: FAMILY MEDICINE CLINIC | Facility: CLINIC | Age: 49
End: 2024-10-30

## 2024-10-30 NOTE — RESULT ENCOUNTER NOTE
Gluc elevated, alk phos improved elevated, valproic acid low 44, and chronic low stable platelets.  She will need to f/u with Ольга regarding her low valproic acid.    Ольга:  mary kate, thanks.  She also has tremors unclear why and thought could be from valproic acid or some other cause to be determined.  Has MRI ordered.    Please let me know if patient has any further questions or concerns.

## 2024-11-04 ENCOUNTER — TELEPHONE (OUTPATIENT)
Dept: FAMILY MEDICINE CLINIC | Facility: CLINIC | Age: 49
End: 2024-11-04

## 2024-11-04 DIAGNOSIS — R11.0 NAUSEA: ICD-10-CM

## 2024-11-04 NOTE — TELEPHONE ENCOUNTER
Pt called in to the office requesting nausea medication for the MRI she will be having next week. Pt states the contrast makes her sick for a few days after taking it. Pt has MRI scheduled for Wednesday 11/13/24.

## 2024-11-05 DIAGNOSIS — M46.1 SACROILIITIS, NOT ELSEWHERE CLASSIFIED (HCC): ICD-10-CM

## 2024-11-05 RX ORDER — ONDANSETRON 4 MG/1
4 TABLET, ORALLY DISINTEGRATING ORAL 3 TIMES DAILY PRN
Qty: 30 TABLET | Refills: 0 | Status: SHIPPED | OUTPATIENT
Start: 2024-11-05

## 2024-11-07 RX ORDER — BACLOFEN 10 MG/1
10 TABLET ORAL 3 TIMES DAILY
Qty: 90 TABLET | Refills: 1 | OUTPATIENT
Start: 2024-11-07 | End: 2025-01-06

## 2024-11-08 ENCOUNTER — TELEPHONE (OUTPATIENT)
Dept: UROLOGY | Age: 49
End: 2024-11-08

## 2024-11-08 RX ORDER — TAMSULOSIN HYDROCHLORIDE 0.4 MG/1
0.4 CAPSULE ORAL EVERY EVENING
Qty: 90 CAPSULE | Refills: 3 | Status: SHIPPED | OUTPATIENT
Start: 2024-11-08

## 2024-11-13 ENCOUNTER — HOSPITAL ENCOUNTER (OUTPATIENT)
Dept: ULTRASOUND IMAGING | Age: 49
Discharge: HOME | End: 2024-11-16
Payer: MEDICARE

## 2024-11-13 ENCOUNTER — HOSPITAL ENCOUNTER (OUTPATIENT)
Dept: MRI IMAGING | Age: 49
Discharge: HOME | End: 2024-11-16
Payer: MEDICARE

## 2024-11-13 ENCOUNTER — HOSPITAL ENCOUNTER (OUTPATIENT)
Dept: CT IMAGING | Age: 49
Discharge: HOME | End: 2024-11-16
Payer: MEDICARE

## 2024-11-13 DIAGNOSIS — M46.1 SACROILIITIS, NOT ELSEWHERE CLASSIFIED (HCC): ICD-10-CM

## 2024-11-13 DIAGNOSIS — R91.1 RIGHT UPPER LOBE PULMONARY NODULE: ICD-10-CM

## 2024-11-13 DIAGNOSIS — R25.1 TREMORS OF NERVOUS SYSTEM: ICD-10-CM

## 2024-11-13 DIAGNOSIS — R09.89 DECREASED PEDAL PULSES: ICD-10-CM

## 2024-11-13 DIAGNOSIS — G43.901 STATUS MIGRAINOSUS: ICD-10-CM

## 2024-11-13 DIAGNOSIS — F17.210 SMOKING GREATER THAN 40 PACK YEARS: ICD-10-CM

## 2024-11-13 PROCEDURE — 6360000004 HC RX CONTRAST MEDICATION: Performed by: NURSE PRACTITIONER

## 2024-11-13 PROCEDURE — 71271 CT THORAX LUNG CANCER SCR C-: CPT

## 2024-11-13 PROCEDURE — A9579 GAD-BASE MR CONTRAST NOS,1ML: HCPCS | Performed by: NURSE PRACTITIONER

## 2024-11-13 PROCEDURE — 70553 MRI BRAIN STEM W/O & W/DYE: CPT

## 2024-11-13 PROCEDURE — 93922 UPR/L XTREMITY ART 2 LEVELS: CPT

## 2024-11-13 RX ORDER — BACLOFEN 10 MG/1
10 TABLET ORAL 3 TIMES DAILY
Qty: 90 TABLET | Refills: 1 | OUTPATIENT
Start: 2024-11-13 | End: 2025-01-12

## 2024-11-13 RX ORDER — BACLOFEN 10 MG/1
10 TABLET ORAL 3 TIMES DAILY
Qty: 90 TABLET | Refills: 1 | Status: SHIPPED | OUTPATIENT
Start: 2024-11-13 | End: 2025-01-12

## 2024-11-13 RX ADMIN — GADOTERIDOL 18 ML: 279.3 INJECTION, SOLUTION INTRAVENOUS at 10:23

## 2024-11-13 NOTE — RESULT ENCOUNTER NOTE
Imaging without significant blockages in the arteries.  If patient has any additional questions or concerns, please let me know.

## 2024-11-21 NOTE — PROGRESS NOTES
Procedure Date: 24      Location: [facility]       Procedure: Bilateral SI joint injection       Time Out performed prior to start of the procedure:       Vamsi Stephen M.D.  performed the following reviews on Alyssa Lorenzana 1975 prior to the start of the procedure:       patient was identified by name and     agreement on procedure being performed was verified   risks and benefits explained to patient by the provider  procedure site verified as Bilateral  patient was positioned for comfort   consent signed and verified for procedure       Time:  12:40 PM        Procedure performed by:   Vamsi Stephen M.D.       Patient assisted by:   Bonnie Gottlieb MA

## 2024-11-26 ENCOUNTER — OFFICE VISIT (OUTPATIENT)
Dept: ORTHOPEDIC SURGERY | Age: 49
End: 2024-11-26
Payer: MEDICARE

## 2024-11-26 DIAGNOSIS — M46.1 SACROILIITIS (HCC): Primary | ICD-10-CM

## 2024-11-26 PROCEDURE — 27096 INJECT SACROILIAC JOINT: CPT | Performed by: ANESTHESIOLOGY

## 2024-11-26 RX ORDER — METHYLPREDNISOLONE ACETATE 40 MG/ML
40 INJECTION, SUSPENSION INTRA-ARTICULAR; INTRALESIONAL; INTRAMUSCULAR; SOFT TISSUE ONCE
Status: COMPLETED | OUTPATIENT
Start: 2024-11-26 | End: 2024-11-26

## 2024-11-26 RX ADMIN — METHYLPREDNISOLONE ACETATE 40 MG: 40 INJECTION, SUSPENSION INTRA-ARTICULAR; INTRALESIONAL; INTRAMUSCULAR; SOFT TISSUE at 08:47

## 2024-11-26 NOTE — PROGRESS NOTES
NAME: Alyssa Lorenzana   ID:[id number]   :1975    Location: [facility]    Procedure: bilateral Sacroiliac Joint Injection Under Fluoroscopic Imaging     Pre-op Diagnosis: Sacroillitis    Post-op Diagnosis: Same     Anesthesia: Local only    Complications: None    After confirming written and informed consent and discussing the risk, benefits and alternatives for the procedure, the patient had the correct site marked by the physician performing the procedure. The specific risks of bleeding and infection were discussed. The patient was taken to the fluoroscopy suite. A pulse oximeter was placed, and verbal and visual monitoring were maintained throughout the procedure.    The patient was then placed in the prone position. The skin overlying the lumbo-sacral spine and the sacroiliac joint was then prepped with chlorhexidine gluconate and a sterile drape was placed. Appropriate sterile attire was worn, including sterile gloves. A time out was then performed involving the physician, radiation technologist and the patient.    Next, the anatomy of the sacroiliac joint was then identified using fluoroscopic guidance. The appropriate sacroiliac joint(s) was identified using klever-posterior fluoroscopy. An oblique view was then obtained to line up the anterior and posterior components of the joint. The skin overlying the expected trajectory of the block needle was then anesthetized with 3 ml of 1% lidocaine. Next, depending on patient habitus a 22G or 25G 3 inch Quincke needle was then advanced using a coaxial technique to the inferior/posterior aspect of the joint, just under the posterior superior iliac spine. Upon contacting the joint capsule, negative aspiration was performed to confirm no intravenous access. Next, Depomedrol 40 mg was injected.    Procedure was completed on the contralateral side using the same technique and medication as discussed above.     The needle was withdrawn and Band-Aids were applied.

## 2024-12-02 ENCOUNTER — OFFICE VISIT (OUTPATIENT)
Dept: BEHAVIORAL/MENTAL HEALTH CLINIC | Age: 49
End: 2024-12-02
Payer: MEDICARE

## 2024-12-02 VITALS
BODY MASS INDEX: 30.76 KG/M2 | OXYGEN SATURATION: 94 % | DIASTOLIC BLOOD PRESSURE: 84 MMHG | SYSTOLIC BLOOD PRESSURE: 128 MMHG | WEIGHT: 196.4 LBS | HEART RATE: 83 BPM

## 2024-12-02 DIAGNOSIS — M54.50 CHRONIC MIDLINE LOW BACK PAIN WITHOUT SCIATICA: ICD-10-CM

## 2024-12-02 DIAGNOSIS — F41.1 GAD (GENERALIZED ANXIETY DISORDER): ICD-10-CM

## 2024-12-02 DIAGNOSIS — M79.2 NEUROPATHIC PAIN: ICD-10-CM

## 2024-12-02 DIAGNOSIS — G89.29 CHRONIC MIDLINE LOW BACK PAIN WITHOUT SCIATICA: ICD-10-CM

## 2024-12-02 DIAGNOSIS — F51.04 INSOMNIA, PSYCHOPHYSIOLOGICAL: ICD-10-CM

## 2024-12-02 DIAGNOSIS — F31.75 BIPOLAR 1 DISORDER, DEPRESSED, PARTIAL REMISSION (HCC): Primary | ICD-10-CM

## 2024-12-02 PROCEDURE — G8427 DOCREV CUR MEDS BY ELIG CLIN: HCPCS | Performed by: NURSE PRACTITIONER

## 2024-12-02 PROCEDURE — 1036F TOBACCO NON-USER: CPT | Performed by: NURSE PRACTITIONER

## 2024-12-02 PROCEDURE — G8417 CALC BMI ABV UP PARAM F/U: HCPCS | Performed by: NURSE PRACTITIONER

## 2024-12-02 PROCEDURE — 99215 OFFICE O/P EST HI 40 MIN: CPT | Performed by: NURSE PRACTITIONER

## 2024-12-02 PROCEDURE — 3074F SYST BP LT 130 MM HG: CPT | Performed by: NURSE PRACTITIONER

## 2024-12-02 PROCEDURE — 3079F DIAST BP 80-89 MM HG: CPT | Performed by: NURSE PRACTITIONER

## 2024-12-02 PROCEDURE — 3017F COLORECTAL CA SCREEN DOC REV: CPT | Performed by: NURSE PRACTITIONER

## 2024-12-02 PROCEDURE — G8484 FLU IMMUNIZE NO ADMIN: HCPCS | Performed by: NURSE PRACTITIONER

## 2024-12-02 RX ORDER — BUSPIRONE HYDROCHLORIDE 15 MG/1
15 TABLET ORAL 2 TIMES DAILY
Qty: 60 TABLET | Refills: 3 | Status: SHIPPED | OUTPATIENT
Start: 2024-12-02 | End: 2025-04-01

## 2024-12-02 RX ORDER — PROPRANOLOL HYDROCHLORIDE 40 MG/1
40 TABLET ORAL 3 TIMES DAILY PRN
Qty: 90 TABLET | Refills: 3 | Status: SHIPPED | OUTPATIENT
Start: 2024-12-02 | End: 2025-04-01

## 2024-12-02 RX ORDER — OLANZAPINE 10 MG/1
10 TABLET ORAL
Qty: 30 TABLET | Refills: 3 | Status: SHIPPED | OUTPATIENT
Start: 2024-12-02 | End: 2025-04-01

## 2024-12-02 RX ORDER — BUPROPION HYDROCHLORIDE 300 MG/1
300 TABLET ORAL EVERY MORNING
Qty: 30 TABLET | Refills: 3 | Status: SHIPPED | OUTPATIENT
Start: 2024-12-02 | End: 2025-04-01

## 2024-12-02 RX ORDER — GABAPENTIN 300 MG/1
600 CAPSULE ORAL 3 TIMES DAILY
Qty: 180 CAPSULE | Refills: 5 | Status: SHIPPED | OUTPATIENT
Start: 2024-12-02 | End: 2025-05-31

## 2024-12-02 RX ORDER — ZOLPIDEM TARTRATE 10 MG/1
10 TABLET ORAL NIGHTLY PRN
Qty: 30 TABLET | Refills: 2 | Status: SHIPPED | OUTPATIENT
Start: 2024-12-06 | End: 2025-03-06

## 2024-12-02 ASSESSMENT — PATIENT HEALTH QUESTIONNAIRE - PHQ9
SUM OF ALL RESPONSES TO PHQ QUESTIONS 1-9: 10
8. MOVING OR SPEAKING SO SLOWLY THAT OTHER PEOPLE COULD HAVE NOTICED. OR THE OPPOSITE, BEING SO FIGETY OR RESTLESS THAT YOU HAVE BEEN MOVING AROUND A LOT MORE THAN USUAL: NOT AT ALL
9. THOUGHTS THAT YOU WOULD BE BETTER OFF DEAD, OR OF HURTING YOURSELF: NOT AT ALL
6. FEELING BAD ABOUT YOURSELF - OR THAT YOU ARE A FAILURE OR HAVE LET YOURSELF OR YOUR FAMILY DOWN: NOT AT ALL
SUM OF ALL RESPONSES TO PHQ QUESTIONS 1-9: 10
SUM OF ALL RESPONSES TO PHQ9 QUESTIONS 1 & 2: 3
3. TROUBLE FALLING OR STAYING ASLEEP: NEARLY EVERY DAY
4. FEELING TIRED OR HAVING LITTLE ENERGY: NEARLY EVERY DAY
1. LITTLE INTEREST OR PLEASURE IN DOING THINGS: SEVERAL DAYS
SUM OF ALL RESPONSES TO PHQ QUESTIONS 1-9: 10
10. IF YOU CHECKED OFF ANY PROBLEMS, HOW DIFFICULT HAVE THESE PROBLEMS MADE IT FOR YOU TO DO YOUR WORK, TAKE CARE OF THINGS AT HOME, OR GET ALONG WITH OTHER PEOPLE: NOT DIFFICULT AT ALL
5. POOR APPETITE OR OVEREATING: SEVERAL DAYS
2. FEELING DOWN, DEPRESSED OR HOPELESS: MORE THAN HALF THE DAYS
7. TROUBLE CONCENTRATING ON THINGS, SUCH AS READING THE NEWSPAPER OR WATCHING TELEVISION: NOT AT ALL
SUM OF ALL RESPONSES TO PHQ QUESTIONS 1-9: 10

## 2024-12-02 NOTE — PROGRESS NOTES
HPI    Alyssa Lorenzana is a 49 y.o. female seen for pelvic exam.  She had a hysterectomy by Dr. Chilel years ago.  She said he told her she had a partial hysterectomy.  Someone told her that means she has a cervix.  She presents today to see if she has a cervix and if so to have a PAP smear.  She did have a mammogram recently and has been called back for additional views.      Past Medical History, Past Surgical History, Family history, Social History, and Medications were all reviewed with the patient today and updated as necessary.     Current Outpatient Medications   Medication Sig    OLANZapine (ZYPREXA) 10 MG tablet Take 1 tablet by mouth nightly    gabapentin (NEURONTIN) 300 MG capsule Take 2 capsules by mouth 3 times daily for 180 days.    propranolol (INDERAL) 40 MG tablet Take 1 tablet by mouth 3 times daily as needed (anxiety)    busPIRone (BUSPAR) 15 MG tablet Take 15 mg by mouth 2 times daily    buPROPion (WELLBUTRIN XL) 300 MG extended release tablet Take 1 tablet by mouth every morning    cariprazine hcl (VRAYLAR) 1.5 MG capsule Take 1 capsule by mouth daily    [START ON 12/6/2024] zolpidem (AMBIEN) 10 MG tablet Take 1 tablet by mouth nightly as needed for Sleep (insomnia) for up to 90 days. Max Daily Amount: 10 mg    baclofen (LIORESAL) 10 MG tablet Take 1 tablet by mouth 3 times daily    tamsulosin (FLOMAX) 0.4 MG capsule Take 1 capsule by mouth every evening    ondansetron (ZOFRAN-ODT) 4 MG disintegrating tablet Take 1 tablet by mouth 3 times daily as needed for Nausea or Vomiting    lisinopril (PRINIVIL;ZESTRIL) 20 MG tablet Take 1 tablet by mouth daily    divalproex (DEPAKOTE ER) 500 MG extended release tablet Take 2 tablets by mouth at bedtime    Ubrogepant (UBRELVY) 100 MG TABS Take 1 at the onset of your headache and repeat in 2 hours as needed headache    fluticasone (FLONASE) 50 MCG/ACT nasal spray 2 sprays by Each Nostril route daily    omeprazole (PRILOSEC) 40 MG delayed release

## 2024-12-02 NOTE — PROGRESS NOTES
OUTPATIENT PSYCHIATRIC RETURN VISIT PROGRESS NOTE      --Ольга Ceja, APRN - CNP on 12/2/2024 at 8:44 AM    An electronic signature was used to authenticate this note.   Date of Service: 12/2/2024     Identification    Alyssa Lorenzana is a 49 y.o.  with a past psychiatric history of bipolar d/o, JUS, insomnia  who presents today for a psychiatric follow up appointment.      CC:  Routine medication management follow up.    Chief Complaint   Patient presents with    Follow-up     Pt was prescribed 2 ativan for MRI and CT scan        Subjective / Interval History:    Pt comes to clinic today and reports that she went to assisted on Friday (Black Friday)..Says she was in [employer] and a woman a doll out of her arms. This agitated the patient and she was arrested for assault . Released on bond.   Reports tremor over past 6 months off and on.She believes this may be related to Depakote.  Increased anxiety, reports panic attacks that prevent her from driving or going anywhere. Irritable and agitated.  Difficulty with sleep maintenance. . May fall asleep at 9 pm but up at 1 AM and cannot get back to sleep.  Agrees to gradually taper Depakote, add olanzapine at HS for mood stabilization. Add propranolol 40 mg bid  for anxiety/panic/ tremor. Resume gabapentin 600 mg tid for anxiety.    . Pt has been medication compliant and denies any side-effects. Pt denies SI, HI and AVH. Does not endorse any manic or psychotic symptoms.    Psychiatric Review Of Systems:  Sleep: difficulty falling back asleep if awakened  Appetite: ok  Current suicidal/homicidal ideations: Denies SI/ HI  Current auditory/visual hallucinations: Denies     Medications:    Current Outpatient Medications:     divalproex (DEPAKOTE ER) 500 MG extended release tablet, Take 2 tablets by mouth at bedtime, Disp: 60 tablet, Rfl: 3    busPIRone (BUSPAR) 15 MG tablet, Take 15 mg by mouth 2 times daily, Disp: 60 tablet, Rfl: 3    divalproex (DEPAKOTE ER) 250 MG

## 2024-12-04 ENCOUNTER — OFFICE VISIT (OUTPATIENT)
Dept: OBGYN CLINIC | Age: 49
End: 2024-12-04

## 2024-12-04 VITALS
BODY MASS INDEX: 31.18 KG/M2 | WEIGHT: 194 LBS | SYSTOLIC BLOOD PRESSURE: 124 MMHG | DIASTOLIC BLOOD PRESSURE: 78 MMHG | HEIGHT: 66 IN

## 2024-12-04 DIAGNOSIS — Z01.419 ENCOUNTER FOR WELL WOMAN EXAM WITH ROUTINE GYNECOLOGICAL EXAM: Primary | ICD-10-CM

## 2024-12-12 ENCOUNTER — HOSPITAL ENCOUNTER (OUTPATIENT)
Dept: MAMMOGRAPHY | Age: 49
Discharge: HOME | End: 2024-12-15
Payer: MEDICARE

## 2024-12-12 DIAGNOSIS — R92.8 ABNORMAL SCREENING MAMMOGRAM: ICD-10-CM

## 2024-12-12 PROCEDURE — G0279 TOMOSYNTHESIS, MAMMO: HCPCS

## 2024-12-16 ENCOUNTER — TELEPHONE (OUTPATIENT)
Dept: FAMILY MEDICINE CLINIC | Facility: CLINIC | Age: 49
End: 2024-12-16

## 2024-12-16 DIAGNOSIS — G25.81 RESTLESS LEGS SYNDROME (RLS): ICD-10-CM

## 2024-12-16 RX ORDER — ROPINIROLE 0.5 MG/1
0.5 TABLET, FILM COATED ORAL NIGHTLY
Qty: 30 TABLET | Refills: 5 | Status: SHIPPED | OUTPATIENT
Start: 2024-12-16

## 2024-12-16 NOTE — TELEPHONE ENCOUNTER
Patient requesting refill on the following has few left  rOPINIRole (REQUIP) 0.5 MG tablet [[id number]]   [facility] - [place], [place] - [address] - P [phone] - F [phone]  [address] [address]  Phone: [phone]  Fax: [phone]       Last visit on 10/2024  Follow up 01/8/2025

## 2024-12-18 NOTE — PROGRESS NOTES
Mrs. Lorenzana is a follow-up for bilateral SI joint injections performed on 11/26/2024.  We trialed her on baclofen 10 mg 3 times a day at her new patient visit.  We also discussed potential medial branch block with RFA above her fusion.

## 2024-12-19 ENCOUNTER — OFFICE VISIT (OUTPATIENT)
Age: 49
End: 2024-12-19
Payer: MEDICARE

## 2024-12-19 DIAGNOSIS — M46.1 SACROILIITIS, NOT ELSEWHERE CLASSIFIED (HCC): ICD-10-CM

## 2024-12-19 PROCEDURE — G8484 FLU IMMUNIZE NO ADMIN: HCPCS | Performed by: PHYSICIAN ASSISTANT

## 2024-12-19 PROCEDURE — 1036F TOBACCO NON-USER: CPT | Performed by: PHYSICIAN ASSISTANT

## 2024-12-19 PROCEDURE — G8417 CALC BMI ABV UP PARAM F/U: HCPCS | Performed by: PHYSICIAN ASSISTANT

## 2024-12-19 PROCEDURE — G8428 CUR MEDS NOT DOCUMENT: HCPCS | Performed by: PHYSICIAN ASSISTANT

## 2024-12-19 PROCEDURE — 99214 OFFICE O/P EST MOD 30 MIN: CPT | Performed by: PHYSICIAN ASSISTANT

## 2024-12-19 PROCEDURE — 3017F COLORECTAL CA SCREEN DOC REV: CPT | Performed by: PHYSICIAN ASSISTANT

## 2024-12-19 RX ORDER — BACLOFEN 10 MG/1
15 TABLET ORAL 3 TIMES DAILY
Qty: 135 TABLET | Refills: 1 | Status: SHIPPED | OUTPATIENT
Start: 2024-12-19 | End: 2025-02-17

## 2024-12-19 ASSESSMENT — ENCOUNTER SYMPTOMS
ALLERGIC/IMMUNOLOGIC NEGATIVE: 1
EYES NEGATIVE: 1
ABDOMINAL PAIN: 0
SHORTNESS OF BREATH: 0

## 2024-12-19 NOTE — PROGRESS NOTES
Chronic Pain Consult Note      Plan:    A comprehensive pain management plan may consist of the following: Testing, Therapy, Medications, Interventions, Consults, and Follow up.    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   Discussed possible MRI as option if leg symptoms progress  Discussed MBB/RFA above fusion potentially as well but would like her increase in pain to reduce prior to considering this option. She is in agreement.   Sacroiliac Joint Dysfunction  S/p Bilateral SI joint injections without benefit - seemed to make her worse.   HEP provided to patient. I want her to stretch prior to trying to walk.   IF HEP and walking does not help, we will refer to [facility] in [place] for manual therapy. She may call for this referral.   Chronic Pain Syndrome  Adjust Baclofen 15mg TID with possibility for adjustment in the future. She may drop back if doing better/back to baseline. Discussed using as prescribed if taking regularly to avoid withdrawal seizures.     General Recommendations: The pain condition that the patient suffers from is best treated with a multidisciplinary approach that involves an increase in physical activity to prevent de-conditioning and worsening of the pain cycle, as well as psychological counseling (formal and/or informal) to address the co morbid psychological effects of pain. Treatment will often involve judicious use of pain medications and interventional procedures to decrease the pain, allowing the patient to participate in the physical activity that will ultimately produce long-lasting pain reductions. The goal of the multidisciplinary approach is to return the patient to a higher level of overall function and to restore their ability to perform activities of daily living.    Narcan nasal spray was prescribed on:    No orders of the defined types were placed in this

## 2024-12-19 NOTE — PATIENT INSTRUCTIONS
Patient Education        Gluteal Strain: Rehab Exercises  Introduction  Here are some examples of exercises for you to try. The exercises may be suggested for a condition or for rehabilitation. Start each exercise slowly. Ease off the exercises if you start to have pain.  You will be told when to start these exercises and which ones will work best for you.  How to do the exercises  Hip rotator stretch    Lie on your back with both knees bent and your feet flat on the floor.  Put the ankle of your affected leg on your opposite thigh near your knee.  Use your hand to gently push the knee of your affected leg away from your body until you feel a gentle stretch around your hip.  Hold the stretch for 15 to 30 seconds.  Repeat 2 to 4 times.  Repeat steps 1 through 5, but this time use your hand to gently pull your knee toward your opposite shoulder.  Switch legs and repeat steps 1 through 6, even if only one hip is sore.  Seated hip rotator stretch    Sit in a sturdy chair.  Cross your affected leg over your knee, resting your foot on top of your knee.  Keep your back straight, and slowly lean forward until you feel a stretch in your hip.  Hold for 15 to 30 seconds.  Switch legs and repeat steps 1 through 4 on your other side.  Repeat 2 to 4 times.  Hamstring stretch (lying down)    Lie flat on your back with your legs straight. If you feel discomfort in your back, place a small towel roll under your lower back.  Holding the back of your affected leg, lift your leg straight up and toward your body until you feel a stretch at the back of your thigh.  Hold the stretch for at least 30 seconds.  Repeat 2 to 4 times.  Switch legs and repeat steps 1 through 4, even if only one hip is sore.  Bridging    Lie on your back with both knees bent. Your knees should be bent about 90 degrees.  Then push your feet into the floor, squeeze your buttocks, and lift your hips off the floor until your shoulders, hips, and knees are all in a

## 2024-12-24 ENCOUNTER — OFFICE VISIT (OUTPATIENT)
Dept: NEUROLOGY | Age: 49
End: 2024-12-24

## 2024-12-24 VITALS — HEART RATE: 71 BPM | SYSTOLIC BLOOD PRESSURE: 143 MMHG | OXYGEN SATURATION: 90 % | DIASTOLIC BLOOD PRESSURE: 85 MMHG

## 2024-12-24 DIAGNOSIS — G43.709 CHRONIC MIGRAINE WITHOUT AURA WITHOUT STATUS MIGRAINOSUS, NOT INTRACTABLE: Primary | ICD-10-CM

## 2024-12-24 DIAGNOSIS — G43.901 STATUS MIGRAINOSUS: ICD-10-CM

## 2024-12-24 NOTE — PROGRESS NOTES
[facility]  [address] [place] [address], [address]  [place] [address]  Phone: [phone] Fax [phone]       Botulinum Toxin Injection Procedure Note    Pre-procedure Diagnosis: Chronic migraine headaches     Patient presents for chemodenervation for chronic migraine. She has been getting botox for last 2-3 years.   Last injection with Dr. CARRIZALES in September'24, patient feels it wasn't as effective as it usually is.  Patient tolerates procedure well. No side effects reported.     Denies side effects and has had a decrease in migraine frequency and intensity    Indications: same  Improving with decreased intensity of migraines.       Past medical history, surgical history, social history, family history, medications and allergies were all reviewed and updated as appropriate.     Procedure Details   Consent: Written consent obtained after the potential risks and benefits have been explained to the patient.  Potential risks include:  Pain, bruising, bleeding, infection, flu-like symptoms, over-weakening of injected or adjacent muscles and swallowing dysfunction. Patient was given the botulinum toxin medication guide according to FDA standards.    Technique: Botox A 200 unit was dissolved into non-preservative normal saline 4 ml. Gauge 30 facial needles were used for the injection.     Bilateral Corrugators: 5 units each side (total 10 units)  Procerus: 5 units  Bilateral frontalis: 5 units at 4 sites ( total 20 units)   Bilateral temporalis: 20 units divided into 2 sites each side (total 40 units) extra 5 units each side  Bilateral occipitalis: 15 units divided into 2 sites each side (total 30 units)  Bilateral cervical paraspinals: 10 units divided into 2 ites each side (total 20 units)   Bilateral trapezius: 15 units divided into 3 sites, each side ( total 30 units)     Total injected: 165 BOTOX 100units/2 cc.   Waste: 35 units     Comments: There were no complications.  The patient tolerated the procedure

## 2025-01-05 SDOH — HEALTH STABILITY: PHYSICAL HEALTH: ON AVERAGE, HOW MANY MINUTES DO YOU ENGAGE IN EXERCISE AT THIS LEVEL?: 20 MIN

## 2025-01-05 SDOH — HEALTH STABILITY: PHYSICAL HEALTH: ON AVERAGE, HOW MANY DAYS PER WEEK DO YOU ENGAGE IN MODERATE TO STRENUOUS EXERCISE (LIKE A BRISK WALK)?: 5 DAYS

## 2025-01-05 ASSESSMENT — PATIENT HEALTH QUESTIONNAIRE - PHQ9
SUM OF ALL RESPONSES TO PHQ QUESTIONS 1-9: 8
7. TROUBLE CONCENTRATING ON THINGS, SUCH AS READING THE NEWSPAPER OR WATCHING TELEVISION: NOT AT ALL
5. POOR APPETITE OR OVEREATING: NOT AT ALL
SUM OF ALL RESPONSES TO PHQ QUESTIONS 1-9: 8
4. FEELING TIRED OR HAVING LITTLE ENERGY: NEARLY EVERY DAY
8. MOVING OR SPEAKING SO SLOWLY THAT OTHER PEOPLE COULD HAVE NOTICED. OR THE OPPOSITE, BEING SO FIGETY OR RESTLESS THAT YOU HAVE BEEN MOVING AROUND A LOT MORE THAN USUAL: NOT AT ALL
SUM OF ALL RESPONSES TO PHQ QUESTIONS 1-9: 8
2. FEELING DOWN, DEPRESSED OR HOPELESS: SEVERAL DAYS
6. FEELING BAD ABOUT YOURSELF - OR THAT YOU ARE A FAILURE OR HAVE LET YOURSELF OR YOUR FAMILY DOWN: SEVERAL DAYS
3. TROUBLE FALLING OR STAYING ASLEEP: NOT AT ALL
SUM OF ALL RESPONSES TO PHQ QUESTIONS 1-9: 8
9. THOUGHTS THAT YOU WOULD BE BETTER OFF DEAD, OR OF HURTING YOURSELF: NOT AT ALL
SUM OF ALL RESPONSES TO PHQ9 QUESTIONS 1 & 2: 4
1. LITTLE INTEREST OR PLEASURE IN DOING THINGS: NEARLY EVERY DAY
10. IF YOU CHECKED OFF ANY PROBLEMS, HOW DIFFICULT HAVE THESE PROBLEMS MADE IT FOR YOU TO DO YOUR WORK, TAKE CARE OF THINGS AT HOME, OR GET ALONG WITH OTHER PEOPLE: SOMEWHAT DIFFICULT

## 2025-01-05 ASSESSMENT — LIFESTYLE VARIABLES
HOW OFTEN DO YOU HAVE A DRINK CONTAINING ALCOHOL: NEVER
HOW OFTEN DO YOU HAVE SIX OR MORE DRINKS ON ONE OCCASION: 1
HOW OFTEN DO YOU HAVE A DRINK CONTAINING ALCOHOL: 1
HOW MANY STANDARD DRINKS CONTAINING ALCOHOL DO YOU HAVE ON A TYPICAL DAY: PATIENT DOES NOT DRINK
HOW MANY STANDARD DRINKS CONTAINING ALCOHOL DO YOU HAVE ON A TYPICAL DAY: 0

## 2025-01-08 ENCOUNTER — OFFICE VISIT (OUTPATIENT)
Dept: FAMILY MEDICINE CLINIC | Facility: CLINIC | Age: 50
End: 2025-01-08

## 2025-01-08 VITALS
OXYGEN SATURATION: 99 % | BODY MASS INDEX: 32.28 KG/M2 | DIASTOLIC BLOOD PRESSURE: 84 MMHG | WEIGHT: 200 LBS | TEMPERATURE: 98.3 F | HEART RATE: 71 BPM | SYSTOLIC BLOOD PRESSURE: 124 MMHG

## 2025-01-08 DIAGNOSIS — D69.6 THROMBOCYTOPENIA (HCC): ICD-10-CM

## 2025-01-08 DIAGNOSIS — K76.0 FATTY LIVER: ICD-10-CM

## 2025-01-08 DIAGNOSIS — E11.51 TYPE 2 DIABETES MELLITUS WITH DIABETIC PERIPHERAL ANGIOPATHY WITHOUT GANGRENE, WITHOUT LONG-TERM CURRENT USE OF INSULIN (HCC): ICD-10-CM

## 2025-01-08 DIAGNOSIS — I10 ESSENTIAL HYPERTENSION: ICD-10-CM

## 2025-01-08 DIAGNOSIS — E11.51 TYPE 2 DIABETES MELLITUS WITH DIABETIC PERIPHERAL ANGIOPATHY WITHOUT GANGRENE, WITHOUT LONG-TERM CURRENT USE OF INSULIN (HCC): Primary | ICD-10-CM

## 2025-01-08 DIAGNOSIS — Z00.00 MEDICARE ANNUAL WELLNESS VISIT, SUBSEQUENT: ICD-10-CM

## 2025-01-08 DIAGNOSIS — K21.9 GASTROESOPHAGEAL REFLUX DISEASE WITHOUT ESOPHAGITIS: ICD-10-CM

## 2025-01-08 DIAGNOSIS — Z87.891 HISTORY OF TOBACCO USE, PRESENTING HAZARDS TO HEALTH: ICD-10-CM

## 2025-01-08 DIAGNOSIS — R71.8 ELEVATED MCV: ICD-10-CM

## 2025-01-08 DIAGNOSIS — E78.00 ELEVATED LDL CHOLESTEROL LEVEL: ICD-10-CM

## 2025-01-08 DIAGNOSIS — G25.81 RESTLESS LEGS SYNDROME (RLS): ICD-10-CM

## 2025-01-08 DIAGNOSIS — J30.9 ALLERGIC RHINITIS, UNSPECIFIED SEASONALITY, UNSPECIFIED TRIGGER: ICD-10-CM

## 2025-01-08 DIAGNOSIS — R11.0 NAUSEA: ICD-10-CM

## 2025-01-08 DIAGNOSIS — R91.8 LUNG NODULES: ICD-10-CM

## 2025-01-08 DIAGNOSIS — E66.09 CLASS 1 OBESITY DUE TO EXCESS CALORIES WITH SERIOUS COMORBIDITY AND BODY MASS INDEX (BMI) OF 32.0 TO 32.9 IN ADULT: ICD-10-CM

## 2025-01-08 DIAGNOSIS — N95.1 VASOMOTOR SYMPTOMS DUE TO MENOPAUSE: ICD-10-CM

## 2025-01-08 DIAGNOSIS — E66.811 CLASS 1 OBESITY DUE TO EXCESS CALORIES WITH SERIOUS COMORBIDITY AND BODY MASS INDEX (BMI) OF 32.0 TO 32.9 IN ADULT: ICD-10-CM

## 2025-01-08 DIAGNOSIS — F17.210 SMOKING GREATER THAN 40 PACK YEARS: ICD-10-CM

## 2025-01-08 LAB
ALBUMIN SERPL-MCNC: 3.5 G/DL (ref 3.5–5)
ALBUMIN/GLOB SERPL: 1 (ref 1–1.9)
ALP SERPL-CCNC: 188 U/L (ref 35–104)
ALT SERPL-CCNC: 20 U/L (ref 8–45)
ANION GAP SERPL CALC-SCNC: 10 MMOL/L (ref 7–16)
AST SERPL-CCNC: 22 U/L (ref 15–37)
BASOPHILS # BLD: 0.07 K/UL (ref 0–0.2)
BASOPHILS NFR BLD: 1.1 % (ref 0–2)
BILIRUB SERPL-MCNC: 0.2 MG/DL (ref 0–1.2)
BUN SERPL-MCNC: 21 MG/DL (ref 6–23)
CALCIUM SERPL-MCNC: 10.1 MG/DL (ref 8.8–10.2)
CHLORIDE SERPL-SCNC: 103 MMOL/L (ref 98–107)
CHOLEST SERPL-MCNC: 175 MG/DL (ref 0–200)
CO2 SERPL-SCNC: 26 MMOL/L (ref 20–29)
CREAT SERPL-MCNC: 0.96 MG/DL (ref 0.6–1.1)
DIFFERENTIAL METHOD BLD: NORMAL
EOSINOPHIL # BLD: 0.24 K/UL (ref 0–0.8)
EOSINOPHIL NFR BLD: 3.8 % (ref 0.5–7.8)
ERYTHROCYTE [DISTWIDTH] IN BLOOD BY AUTOMATED COUNT: 12.8 % (ref 11.9–14.6)
GLOBULIN SER CALC-MCNC: 3.5 G/DL (ref 2.3–3.5)
GLUCOSE SERPL-MCNC: 209 MG/DL (ref 70–99)
HBA1C MFR BLD: 8.3 %
HCT VFR BLD AUTO: 40.9 % (ref 35.8–46.3)
HDLC SERPL-MCNC: 52 MG/DL (ref 40–60)
HDLC SERPL: 3.3 (ref 0–5)
HGB BLD-MCNC: 13.8 G/DL (ref 11.7–15.4)
IMM GRANULOCYTES # BLD AUTO: 0.03 K/UL (ref 0–0.5)
IMM GRANULOCYTES NFR BLD AUTO: 0.5 % (ref 0–5)
LDLC SERPL CALC-MCNC: 79 MG/DL (ref 0–100)
LYMPHOCYTES # BLD: 1.82 K/UL (ref 0.5–4.6)
LYMPHOCYTES NFR BLD: 29.1 % (ref 13–44)
MCH RBC QN AUTO: 32.3 PG (ref 26.1–32.9)
MCHC RBC AUTO-ENTMCNC: 33.7 G/DL (ref 31.4–35)
MCV RBC AUTO: 95.8 FL (ref 82–102)
MONOCYTES # BLD: 0.43 K/UL (ref 0.1–1.3)
MONOCYTES NFR BLD: 6.9 % (ref 4–12)
NEUTS SEG # BLD: 3.67 K/UL (ref 1.7–8.2)
NEUTS SEG NFR BLD: 58.6 % (ref 43–78)
NRBC # BLD: 0 K/UL (ref 0–0.2)
PLATELET # BLD AUTO: 186 K/UL (ref 150–450)
PMV BLD AUTO: 11.7 FL (ref 9.4–12.3)
POTASSIUM SERPL-SCNC: 4.5 MMOL/L (ref 3.5–5.1)
PROT SERPL-MCNC: 7.1 G/DL (ref 6.3–8.2)
RBC # BLD AUTO: 4.27 M/UL (ref 4.05–5.2)
SODIUM SERPL-SCNC: 138 MMOL/L (ref 136–145)
TRIGL SERPL-MCNC: 220 MG/DL (ref 0–150)
VLDLC SERPL CALC-MCNC: 44 MG/DL (ref 6–23)
WBC # BLD AUTO: 6.3 K/UL (ref 4.3–11.1)

## 2025-01-08 SDOH — ECONOMIC STABILITY: FOOD INSECURITY: WITHIN THE PAST 12 MONTHS, THE FOOD YOU BOUGHT JUST DIDN'T LAST AND YOU DIDN'T HAVE MONEY TO GET MORE.: NEVER TRUE

## 2025-01-08 SDOH — ECONOMIC STABILITY: FOOD INSECURITY: WITHIN THE PAST 12 MONTHS, YOU WORRIED THAT YOUR FOOD WOULD RUN OUT BEFORE YOU GOT MONEY TO BUY MORE.: NEVER TRUE

## 2025-01-08 NOTE — PROGRESS NOTES
Allergies   Allergen Reactions    Demerol Hcl [Meperidine] Other (See Comments)     Increased agitation and confusion, restlessness     Prior to Visit Medications    Medication Sig Taking? Authorizing Provider   rOPINIRole (REQUIP) 0.5 MG tablet Take 1 tablet by mouth nightly Yes Carlee Medrano APRN - CNP   ondansetron (ZOFRAN-ODT) 4 MG disintegrating tablet Take 1 tablet by mouth 3 times daily as needed for Nausea or Vomiting Yes Carlee Medrano APRN - CNP   omeprazole (PRILOSEC) 40 MG delayed release capsule Take 1 capsule by mouth daily Yes Carlee Medrano APRN - CNP   lisinopril (PRINIVIL;ZESTRIL) 20 MG tablet Take 1 tablet by mouth daily Yes Carlee Medrano APRN - CNP   fluticasone (FLONASE) 50 MCG/ACT nasal spray 2 sprays by Each Nostril route daily Yes Carlee Medrano APRN - CNP   cloNIDine (CATAPRES) 0.1 MG tablet Take 1 tablet by mouth 2 times daily Yes Carlee Medrano APRN - CNP   atorvastatin (LIPITOR) 40 MG tablet Take 1 tablet by mouth every evening Yes Carlee Medrano APRN - CNP   dulaglutide (TRULICITY) 0.75 MG/0.5ML SOAJ SC injection Inject 0.5 mLs into the skin once a week Yes Carlee Medrano APRN - CNP   baclofen (LIORESAL) 10 MG tablet Take 1.5 tablets by mouth 3 times daily Yes Cheryl Hatfield PA   gabapentin (NEURONTIN) 300 MG capsule Take 2 capsules by mouth 3 times daily for 180 days. Yes Ольга Ceja APRN - CNP   propranolol (INDERAL) 40 MG tablet Take 1 tablet by mouth 3 times daily as needed (anxiety) Yes Ольга Ceja APRN - CNP   busPIRone (BUSPAR) 15 MG tablet Take 15 mg by mouth 2 times daily Yes Ольга Ceja APRN - CNP   buPROPion (WELLBUTRIN XL) 300 MG extended release tablet Take 1 tablet by mouth every morning Yes Ольга Ceja APRN - CNP   cariprazine hcl (VRAYLAR) 1.5 MG capsule Take 1 capsule by mouth daily Yes Ольга Ceja APRN - CNP   zolpidem (AMBIEN) 10 MG tablet Take 1 tablet by mouth nightly as needed for Sleep (insomnia) for up to 90 days. Max

## 2025-01-08 NOTE — PATIENT INSTRUCTIONS
out of your field of vision.  Color vision test   You look at pieces of printed test patterns in various colors. You say what number or symbol you see.  Your doctor may have you trace the number or symbol using a pointer.  How do these tests feel?  There is very little chance of having a problem from this test. If dilating drops are used for a vision test, they may make the eyes sting and cause a medicine taste in the mouth.  Follow-up care is a key part of your treatment and safety. Be sure to make and go to all appointments, and call your doctor if you are having problems. It's also a good idea to know your test results and keep a list of the medicines you take.  Where can you learn more?  Go to https://www.[employer].net/patientEd and enter G551 to learn more about \"Learning About Vision Tests.\"  Current as of: July 31, 2024  Content Version: 14.3  © 2024 [employer].   Care instructions adapted under license by [employer]. If you have questions about a medical condition or this instruction, always ask your healthcare professional. [employer], [employer], disclaims any warranty or liability for your use of this information.         Eating Healthy Foods: Care Instructions  With every meal, you can make healthy food choices. Try to eat a variety of fruits, vegetables, whole grains, lean proteins, and low-fat dairy products. This can help you get the right balance of nutrients, including vitamins and minerals. Small changes add up over time. You can start by adding one healthy food to your meals each day.    Try to make half your plate fruits and vegetables, one-fourth whole grains, and one-fourth lean proteins. Try including dairy with your meals.   Eat more fruits and vegetables. Try to have them with most meals and snacks.   Foods for healthy eating        Fruits   These can be fresh, frozen, canned, or dried.  Try to choose whole fruit rather than fruit juice.  Eat a variety of colors.

## 2025-01-09 RX ORDER — ONDANSETRON 4 MG/1
4 TABLET, ORALLY DISINTEGRATING ORAL 3 TIMES DAILY PRN
Qty: 30 TABLET | Refills: 1 | Status: SHIPPED | OUTPATIENT
Start: 2025-01-09

## 2025-01-09 RX ORDER — OMEPRAZOLE 40 MG/1
40 CAPSULE, DELAYED RELEASE ORAL DAILY
Qty: 90 CAPSULE | Refills: 3 | Status: SHIPPED | OUTPATIENT
Start: 2025-01-09

## 2025-01-09 RX ORDER — FLUTICASONE PROPIONATE 50 MCG
2 SPRAY, SUSPENSION (ML) NASAL DAILY
Qty: 16 G | Refills: 11 | Status: SHIPPED | OUTPATIENT
Start: 2025-01-09

## 2025-01-09 RX ORDER — CLONIDINE HYDROCHLORIDE 0.1 MG/1
0.1 TABLET ORAL 2 TIMES DAILY
Qty: 180 TABLET | Refills: 3 | Status: SHIPPED | OUTPATIENT
Start: 2025-01-09

## 2025-01-09 RX ORDER — ROPINIROLE 0.5 MG/1
0.5 TABLET, FILM COATED ORAL NIGHTLY
Qty: 90 TABLET | Refills: 3 | Status: SHIPPED | OUTPATIENT
Start: 2025-01-09

## 2025-01-09 RX ORDER — LISINOPRIL 20 MG/1
20 TABLET ORAL DAILY
Qty: 90 TABLET | Refills: 2 | Status: SHIPPED | OUTPATIENT
Start: 2025-01-09

## 2025-01-09 RX ORDER — ATORVASTATIN CALCIUM 40 MG/1
40 TABLET, FILM COATED ORAL EVERY EVENING
Qty: 90 TABLET | Refills: 3 | Status: SHIPPED | OUTPATIENT
Start: 2025-01-09

## 2025-01-09 ASSESSMENT — ENCOUNTER SYMPTOMS
BACK PAIN: 1
EYES NEGATIVE: 1
NAUSEA: 1
RESPIRATORY NEGATIVE: 1

## 2025-01-09 NOTE — ASSESSMENT & PLAN NOTE
Chronic, at goal (stable), continue current treatment plan and lifestyle modifications recommended   Patient LM stating she has leaky heart valves and has covid. Asking if Covid will effect this in any way.

## 2025-01-09 NOTE — ASSESSMENT & PLAN NOTE
Chronic, at goal (stable), continue current plan pending work up below and lifestyle modifications recommended

## 2025-01-09 NOTE — RESULT ENCOUNTER NOTE
Gluc elevated, continue diabetic diet.  LDL cholesterol 79 with recommendation less than 70 for prevention of cardiovascular events MI/CVA.  Increased atorvastatin to 40 mg every evening.  Med refills sent this am; apologize please, I thought I had sent yesterday.    Please let me know if patient has any further questions or concerns.

## 2025-01-09 NOTE — ASSESSMENT & PLAN NOTE
Chronic, not at goal (unstable), changes made today: tried 's Trulicity and tolerated; side effect N/V/D with ozempic, medication adherence emphasized, and lifestyle modifications recommended  Diabetic diet reviewed.

## 2025-01-10 LAB — ALP SERPL-CCNC: 203 IU/L (ref 44–121)

## 2025-01-16 ENCOUNTER — TELEPHONE (OUTPATIENT)
Dept: FAMILY MEDICINE CLINIC | Facility: CLINIC | Age: 50
End: 2025-01-16

## 2025-01-16 DIAGNOSIS — E11.51 TYPE 2 DIABETES MELLITUS WITH DIABETIC PERIPHERAL ANGIOPATHY WITHOUT GANGRENE, WITHOUT LONG-TERM CURRENT USE OF INSULIN (HCC): ICD-10-CM

## 2025-01-16 NOTE — TELEPHONE ENCOUNTER
Pt is needing Rx   dulaglutide (TRULICITY) 0.75 MG/0.5ML SOAJ SC injection sent to [facility], pt other pharmacy does not carry.

## 2025-01-17 DIAGNOSIS — Q23.81 BICUSPID AORTIC VALVE: Primary | ICD-10-CM

## 2025-01-17 DIAGNOSIS — I71.21 ANEURYSM OF ASCENDING AORTA WITHOUT RUPTURE (HCC): ICD-10-CM

## 2025-01-20 ENCOUNTER — OFFICE VISIT (OUTPATIENT)
Dept: BEHAVIORAL/MENTAL HEALTH CLINIC | Age: 50
End: 2025-01-20
Payer: MEDICARE

## 2025-01-20 VITALS
WEIGHT: 199.4 LBS | HEART RATE: 74 BPM | BODY MASS INDEX: 32.18 KG/M2 | DIASTOLIC BLOOD PRESSURE: 84 MMHG | SYSTOLIC BLOOD PRESSURE: 126 MMHG

## 2025-01-20 DIAGNOSIS — M79.2 NEUROPATHIC PAIN: ICD-10-CM

## 2025-01-20 DIAGNOSIS — F41.1 GAD (GENERALIZED ANXIETY DISORDER): ICD-10-CM

## 2025-01-20 DIAGNOSIS — M54.50 CHRONIC MIDLINE LOW BACK PAIN WITHOUT SCIATICA: ICD-10-CM

## 2025-01-20 DIAGNOSIS — G89.29 CHRONIC MIDLINE LOW BACK PAIN WITHOUT SCIATICA: ICD-10-CM

## 2025-01-20 DIAGNOSIS — F31.75 BIPOLAR 1 DISORDER, DEPRESSED, PARTIAL REMISSION (HCC): Primary | ICD-10-CM

## 2025-01-20 DIAGNOSIS — F51.04 INSOMNIA, PSYCHOPHYSIOLOGICAL: ICD-10-CM

## 2025-01-20 LAB
ALP BONE CFR SERPL: 42 % (ref 14–68)
ALP INTEST CFR SERPL: 8 % (ref 0–18)
ALP LIVER CFR SERPL: 50 % (ref 18–85)
ALP SERPL-CCNC: 203 IU/L (ref 44–121)

## 2025-01-20 PROCEDURE — 3074F SYST BP LT 130 MM HG: CPT | Performed by: NURSE PRACTITIONER

## 2025-01-20 PROCEDURE — 99215 OFFICE O/P EST HI 40 MIN: CPT | Performed by: NURSE PRACTITIONER

## 2025-01-20 PROCEDURE — G8417 CALC BMI ABV UP PARAM F/U: HCPCS | Performed by: NURSE PRACTITIONER

## 2025-01-20 PROCEDURE — 1036F TOBACCO NON-USER: CPT | Performed by: NURSE PRACTITIONER

## 2025-01-20 PROCEDURE — G8427 DOCREV CUR MEDS BY ELIG CLIN: HCPCS | Performed by: NURSE PRACTITIONER

## 2025-01-20 PROCEDURE — 3079F DIAST BP 80-89 MM HG: CPT | Performed by: NURSE PRACTITIONER

## 2025-01-20 PROCEDURE — 3017F COLORECTAL CA SCREEN DOC REV: CPT | Performed by: NURSE PRACTITIONER

## 2025-01-20 RX ORDER — ZOLPIDEM TARTRATE 10 MG/1
10 TABLET ORAL NIGHTLY PRN
Qty: 30 TABLET | Refills: 2 | Status: SHIPPED | OUTPATIENT
Start: 2025-01-04 | End: 2025-04-04

## 2025-01-20 RX ORDER — PROPRANOLOL HYDROCHLORIDE 40 MG/1
40 TABLET ORAL 3 TIMES DAILY PRN
Qty: 90 TABLET | Refills: 3 | Status: SHIPPED | OUTPATIENT
Start: 2025-01-20 | End: 2025-05-20

## 2025-01-20 RX ORDER — OLANZAPINE 10 MG/1
10 TABLET ORAL
Qty: 30 TABLET | Refills: 3 | Status: SHIPPED | OUTPATIENT
Start: 2025-01-20 | End: 2025-05-20

## 2025-01-20 RX ORDER — BUSPIRONE HYDROCHLORIDE 15 MG/1
15 TABLET ORAL 2 TIMES DAILY
Qty: 60 TABLET | Refills: 3 | Status: SHIPPED | OUTPATIENT
Start: 2025-01-20 | End: 2025-05-20

## 2025-01-20 RX ORDER — GABAPENTIN 300 MG/1
600 CAPSULE ORAL 3 TIMES DAILY
Qty: 180 CAPSULE | Refills: 5 | Status: SHIPPED | OUTPATIENT
Start: 2025-01-20 | End: 2025-07-19

## 2025-01-20 RX ORDER — BUPROPION HYDROCHLORIDE 300 MG/1
300 TABLET ORAL EVERY MORNING
Qty: 30 TABLET | Refills: 3 | Status: SHIPPED | OUTPATIENT
Start: 2025-01-20 | End: 2025-05-20

## 2025-01-20 ASSESSMENT — PATIENT HEALTH QUESTIONNAIRE - PHQ9
SUM OF ALL RESPONSES TO PHQ9 QUESTIONS 1 & 2: 5
6. FEELING BAD ABOUT YOURSELF - OR THAT YOU ARE A FAILURE OR HAVE LET YOURSELF OR YOUR FAMILY DOWN: SEVERAL DAYS
7. TROUBLE CONCENTRATING ON THINGS, SUCH AS READING THE NEWSPAPER OR WATCHING TELEVISION: NOT AT ALL
SUM OF ALL RESPONSES TO PHQ QUESTIONS 1-9: 6
3. TROUBLE FALLING OR STAYING ASLEEP: NOT AT ALL
SUM OF ALL RESPONSES TO PHQ QUESTIONS 1-9: 6
10. IF YOU CHECKED OFF ANY PROBLEMS, HOW DIFFICULT HAVE THESE PROBLEMS MADE IT FOR YOU TO DO YOUR WORK, TAKE CARE OF THINGS AT HOME, OR GET ALONG WITH OTHER PEOPLE: SOMEWHAT DIFFICULT
9. THOUGHTS THAT YOU WOULD BE BETTER OFF DEAD, OR OF HURTING YOURSELF: NOT AT ALL
1. LITTLE INTEREST OR PLEASURE IN DOING THINGS: NEARLY EVERY DAY
2. FEELING DOWN, DEPRESSED OR HOPELESS: MORE THAN HALF THE DAYS
8. MOVING OR SPEAKING SO SLOWLY THAT OTHER PEOPLE COULD HAVE NOTICED. OR THE OPPOSITE, BEING SO FIGETY OR RESTLESS THAT YOU HAVE BEEN MOVING AROUND A LOT MORE THAN USUAL: NOT AT ALL
SUM OF ALL RESPONSES TO PHQ QUESTIONS 1-9: 6
4. FEELING TIRED OR HAVING LITTLE ENERGY: NOT AT ALL
5. POOR APPETITE OR OVEREATING: NOT AT ALL
SUM OF ALL RESPONSES TO PHQ QUESTIONS 1-9: 6

## 2025-01-20 NOTE — PROGRESS NOTES
tablet, Rfl: 3    busPIRone (BUSPAR) 15 MG tablet, Take 15 mg by mouth 2 times daily, Disp: 60 tablet, Rfl: 3    buPROPion (WELLBUTRIN XL) 300 MG extended release tablet, Take 1 tablet by mouth every morning, Disp: 30 tablet, Rfl: 3    cariprazine hcl (VRAYLAR) 1.5 MG capsule, Take 1 capsule by mouth daily, Disp: 30 capsule, Rfl: 3    zolpidem (AMBIEN) 10 MG tablet, Take 1 tablet by mouth nightly as needed for Sleep (insomnia) for up to 90 days. Max Daily Amount: 10 mg, Disp: 30 tablet, Rfl: 2    dulaglutide (TRULICITY) 0.75 MG/0.5ML SOAJ SC injection, Inject 0.5 mLs into the skin once a week, Disp: 2 mL, Rfl: 5    ondansetron (ZOFRAN-ODT) 4 MG disintegrating tablet, Take 1 tablet by mouth 3 times daily as needed for Nausea or Vomiting, Disp: 30 tablet, Rfl: 1    omeprazole (PRILOSEC) 40 MG delayed release capsule, Take 1 capsule by mouth daily, Disp: 90 capsule, Rfl: 3    lisinopril (PRINIVIL;ZESTRIL) 20 MG tablet, Take 1 tablet by mouth daily, Disp: 90 tablet, Rfl: 2    fluticasone (FLONASE) 50 MCG/ACT nasal spray, 2 sprays by Each Nostril route daily, Disp: 16 g, Rfl: 11    atorvastatin (LIPITOR) 40 MG tablet, Take 1 tablet by mouth every evening, Disp: 90 tablet, Rfl: 3    baclofen (LIORESAL) 10 MG tablet, Take 1.5 tablets by mouth 3 times daily, Disp: 135 tablet, Rfl: 1    OLANZapine (ZYPREXA) 10 MG tablet, Take 1 tablet by mouth nightly, Disp: 30 tablet, Rfl: 3    gabapentin (NEURONTIN) 300 MG capsule, Take 2 capsules by mouth 3 times daily for 180 days., Disp: 180 capsule, Rfl: 5    tamsulosin (FLOMAX) 0.4 MG capsule, Take 1 capsule by mouth every evening, Disp: 90 capsule, Rfl: 3    divalproex (DEPAKOTE ER) 500 MG extended release tablet, Take 2 tablets by mouth at bedtime, Disp: 60 tablet, Rfl: 3    Ubrogepant (UBRELVY) 100 MG TABS, Take 1 at the onset of your headache and repeat in 2 hours as needed headache, Disp: 12 tablet, Rfl: 11    Onabotulinumtoxin A (BOTOX) 200 units injection, Inject 200 Units into

## 2025-01-22 ENCOUNTER — HOSPITAL ENCOUNTER (OUTPATIENT)
Dept: NON INVASIVE DIAGNOSTICS | Age: 50
Discharge: HOME | End: 2025-01-24
Payer: MEDICARE

## 2025-01-22 DIAGNOSIS — Q23.81 BICUSPID AORTIC VALVE: ICD-10-CM

## 2025-01-22 DIAGNOSIS — I71.21 ANEURYSM OF ASCENDING AORTA WITHOUT RUPTURE (HCC): ICD-10-CM

## 2025-01-22 LAB
ECHO AO ASC DIAM: 4.9 CM
ECHO AO ROOT DIAM: 3.9 CM
ECHO AR MAX VEL PISA: 5.1 M/S
ECHO AV AREA PEAK VELOCITY: 1.3 CM2
ECHO AV AREA VTI: 1.4 CM2
ECHO AV MEAN GRADIENT: 9 MMHG
ECHO AV MEAN VELOCITY: 1.4 M/S
ECHO AV PEAK GRADIENT: 16 MMHG
ECHO AV PEAK VELOCITY: 2 M/S
ECHO AV REGURGITANT PHT: 649 MS
ECHO AV VELOCITY RATIO: 0.4
ECHO AV VTI: 43.4 CM
ECHO EST RA PRESSURE: 3 MMHG
ECHO IVC PROX: 1.4 CM
ECHO LA AREA 2C: 8.2 CM2
ECHO LA AREA 4C: 10.2 CM2
ECHO LA DIAMETER: 3.7 CM
ECHO LA MAJOR AXIS: 4.1 CM
ECHO LA MINOR AXIS: 3.5 CM
ECHO LA TO AORTIC ROOT RATIO: 0.95
ECHO LA VOL BP: 19 ML (ref 22–52)
ECHO LA VOL MOD A2C: 16 ML (ref 22–52)
ECHO LA VOL MOD A4C: 20 ML (ref 22–52)
ECHO LV E' LATERAL VELOCITY: 8.16 CM/S
ECHO LV E' SEPTAL VELOCITY: 4.9 CM/S
ECHO LV EDV A2C: 73 ML
ECHO LV EDV A4C: 112 ML
ECHO LV EJECTION FRACTION A2C: 59 %
ECHO LV EJECTION FRACTION A4C: 51 %
ECHO LV EJECTION FRACTION BIPLANE: 52 % (ref 55–100)
ECHO LV ESV A2C: 30 ML
ECHO LV ESV A4C: 55 ML
ECHO LV FRACTIONAL SHORTENING: 27 % (ref 28–44)
ECHO LV INTERNAL DIMENSION DIASTOLIC: 4.1 CM (ref 3.9–5.3)
ECHO LV INTERNAL DIMENSION SYSTOLIC: 3 CM
ECHO LV IVSD: 1.2 CM (ref 0.6–0.9)
ECHO LV MASS 2D: 161.4 G (ref 67–162)
ECHO LV POSTERIOR WALL DIASTOLIC: 1.1 CM (ref 0.6–0.9)
ECHO LV RELATIVE WALL THICKNESS RATIO: 0.54
ECHO LVOT AREA: 3.1 CM2
ECHO LVOT AV VTI INDEX: 0.45
ECHO LVOT DIAM: 2 CM
ECHO LVOT MEAN GRADIENT: 1 MMHG
ECHO LVOT PEAK GRADIENT: 3 MMHG
ECHO LVOT PEAK VELOCITY: 0.8 M/S
ECHO LVOT SV: 61.5 ML
ECHO LVOT VTI: 19.6 CM
ECHO MV A VELOCITY: 0.73 M/S
ECHO MV AREA VTI: 2.7 CM2
ECHO MV E DECELERATION TIME (DT): 210 MS
ECHO MV E VELOCITY: 0.67 M/S
ECHO MV E/A RATIO: 0.92
ECHO MV E/E' LATERAL: 8.21
ECHO MV E/E' RATIO (AVERAGED): 10.94
ECHO MV E/E' SEPTAL: 13.67
ECHO MV LVOT VTI INDEX: 1.17
ECHO MV MAX VELOCITY: 0.9 M/S
ECHO MV MEAN GRADIENT: 1 MMHG
ECHO MV MEAN VELOCITY: 0.5 M/S
ECHO MV PEAK GRADIENT: 3 MMHG
ECHO MV VTI: 22.9 CM
ECHO PV ACCELERATION TIME (AT): 95 MS
ECHO PV MAX VELOCITY: 0.8 M/S
ECHO PV PEAK GRADIENT: 3 MMHG
ECHO RIGHT VENTRICULAR SYSTOLIC PRESSURE (RVSP): 17 MMHG
ECHO RV BASAL DIMENSION: 2.6 CM
ECHO RV FREE WALL PEAK S': 10.7 CM/S
ECHO RV INTERNAL DIMENSION: 2.6 CM
ECHO RV TAPSE: 1.8 CM (ref 1.7–?)
ECHO TV REGURGITANT MAX VELOCITY: 1.86 M/S
ECHO TV REGURGITANT PEAK GRADIENT: 14 MMHG

## 2025-01-22 PROCEDURE — 93306 TTE W/DOPPLER COMPLETE: CPT

## 2025-01-28 ENCOUNTER — OFFICE VISIT (OUTPATIENT)
Dept: CARDIOTHORACIC SURGERY | Age: 50
End: 2025-01-28
Payer: MEDICARE

## 2025-01-28 VITALS
BODY MASS INDEX: 31.02 KG/M2 | WEIGHT: 193 LBS | HEIGHT: 66 IN | DIASTOLIC BLOOD PRESSURE: 60 MMHG | SYSTOLIC BLOOD PRESSURE: 98 MMHG

## 2025-01-28 DIAGNOSIS — I71.21 ANEURYSM OF ASCENDING AORTA WITHOUT RUPTURE (HCC): Primary | ICD-10-CM

## 2025-01-28 PROCEDURE — 1036F TOBACCO NON-USER: CPT | Performed by: THORACIC SURGERY (CARDIOTHORACIC VASCULAR SURGERY)

## 2025-01-28 PROCEDURE — 3074F SYST BP LT 130 MM HG: CPT | Performed by: THORACIC SURGERY (CARDIOTHORACIC VASCULAR SURGERY)

## 2025-01-28 PROCEDURE — 3017F COLORECTAL CA SCREEN DOC REV: CPT | Performed by: THORACIC SURGERY (CARDIOTHORACIC VASCULAR SURGERY)

## 2025-01-28 PROCEDURE — 3078F DIAST BP <80 MM HG: CPT | Performed by: THORACIC SURGERY (CARDIOTHORACIC VASCULAR SURGERY)

## 2025-01-28 PROCEDURE — G8417 CALC BMI ABV UP PARAM F/U: HCPCS | Performed by: THORACIC SURGERY (CARDIOTHORACIC VASCULAR SURGERY)

## 2025-01-28 PROCEDURE — 99205 OFFICE O/P NEW HI 60 MIN: CPT | Performed by: THORACIC SURGERY (CARDIOTHORACIC VASCULAR SURGERY)

## 2025-01-28 PROCEDURE — G8427 DOCREV CUR MEDS BY ELIG CLIN: HCPCS | Performed by: THORACIC SURGERY (CARDIOTHORACIC VASCULAR SURGERY)

## 2025-01-28 NOTE — PROGRESS NOTES
[facility]  MD Collin Aguiar MD Jennifer Whitmire, S, PAKushalC        Alyssa Lorenzana       [id number]  1975        DATE OF CONSULTATION:  2025    Chief Complaint   Patient presents with    Consultation       HISTORY OF PRESENT ILLNESS:  The patient is a 49 y.o. female who is seen for evaluation of ascending aortic aneurysm.  Pt has been followed every 2 years with serial CT and little change since .  Recent Echo confirms probable bicuspid AV with minimal gradient.   No symptoms.  Pt was previous smoker but says has stopped for several years    Past Medical History:   Diagnosis Date    ADHD (attention deficit hyperactivity disorder) 1980    Aneurysm ([facility])     Aorta aneurysm measured 4.8/4.7cm on 10/06/2021, monitoring at present time    Anxiety 10-16-91    Bicuspid aortic valve     Bipolar disorder ([facility])     CAD (coronary artery disease)     Echo on 2018 showed  EF 50-55%    Chronic pain 2016    Cigarette smoker 2016    Controlled type 2 diabetes mellitus without complication, with long-term current use of insulin ([facility]) 2018    Depression     Fatty liver 2022    Headache     Hypercholesterolemia 2018    Hypertension     manged with med    Insomnia 2016    Lumbar stenosis with neurogenic claudication 2022    Non-intractable vomiting 2021    Obesity (BMI 30-39.9) 2016    Psychiatric disorder     anxiety , ADHD    Status post lumbar spinal arthrodesis 2022    Type 2 diabetes mellitus ([facility]) 2022    Type 2 diabetes mellitus with peripheral vascular disease ([facility]) 2019    no med at present time (2022) corrected with weight loss    Unspecified adverse effect of anesthesia     high anxiety after surgery, hyper       Past Surgical History:   Procedure Laterality Date    APPENDECTOMY       SECTION      COLONOSCOPY N/A 2016

## 2025-01-30 ENCOUNTER — OFFICE VISIT (OUTPATIENT)
Dept: FAMILY MEDICINE CLINIC | Facility: CLINIC | Age: 50
End: 2025-01-30
Payer: MEDICARE

## 2025-01-30 ENCOUNTER — HOSPITAL ENCOUNTER (OUTPATIENT)
Dept: GENERAL RADIOLOGY | Age: 50
Discharge: HOME | End: 2025-01-30
Payer: MEDICARE

## 2025-01-30 ENCOUNTER — OFFICE VISIT (OUTPATIENT)
Age: 50
End: 2025-01-30

## 2025-01-30 VITALS
BODY MASS INDEX: 31.02 KG/M2 | WEIGHT: 193 LBS | RESPIRATION RATE: 16 BRPM | HEART RATE: 93 BPM | TEMPERATURE: 98.3 F | OXYGEN SATURATION: 98 % | HEIGHT: 66 IN | DIASTOLIC BLOOD PRESSURE: 82 MMHG | SYSTOLIC BLOOD PRESSURE: 121 MMHG

## 2025-01-30 DIAGNOSIS — R05.1 ACUTE COUGH: ICD-10-CM

## 2025-01-30 DIAGNOSIS — J10.1 INFLUENZA A: Primary | ICD-10-CM

## 2025-01-30 DIAGNOSIS — J10.1 INFLUENZA A: ICD-10-CM

## 2025-01-30 DIAGNOSIS — R11.2 NAUSEA AND VOMITING, UNSPECIFIED VOMITING TYPE: ICD-10-CM

## 2025-01-30 DIAGNOSIS — M46.1 SACROILIITIS, NOT ELSEWHERE CLASSIFIED (HCC): Primary | ICD-10-CM

## 2025-01-30 DIAGNOSIS — R53.1 GENERALIZED WEAKNESS: ICD-10-CM

## 2025-01-30 LAB
ALBUMIN SERPL-MCNC: 3.5 G/DL (ref 3.5–5)
ALBUMIN/GLOB SERPL: 1 (ref 1–1.9)
ALP SERPL-CCNC: 181 U/L (ref 35–104)
ALT SERPL-CCNC: 44 U/L (ref 8–45)
ANION GAP SERPL CALC-SCNC: 17 MMOL/L (ref 7–16)
AST SERPL-CCNC: 36 U/L (ref 15–37)
BASOPHILS # BLD: 0.03 K/UL (ref 0–0.2)
BASOPHILS NFR BLD: 0.7 % (ref 0–2)
BILIRUB SERPL-MCNC: 0.3 MG/DL (ref 0–1.2)
BUN SERPL-MCNC: 20 MG/DL (ref 6–23)
CALCIUM SERPL-MCNC: 9.6 MG/DL (ref 8.8–10.2)
CHLORIDE SERPL-SCNC: 103 MMOL/L (ref 98–107)
CO2 SERPL-SCNC: 22 MMOL/L (ref 20–29)
CREAT SERPL-MCNC: 1.17 MG/DL (ref 0.6–1.1)
DIFFERENTIAL METHOD BLD: ABNORMAL
EOSINOPHIL # BLD: 0 K/UL (ref 0–0.8)
EOSINOPHIL NFR BLD: 0 % (ref 0.5–7.8)
ERYTHROCYTE [DISTWIDTH] IN BLOOD BY AUTOMATED COUNT: 12.9 % (ref 11.9–14.6)
EXP DATE SOLUTION: NORMAL
EXP DATE SWAB: NORMAL
EXPIRATION DATE: NORMAL
GLOBULIN SER CALC-MCNC: 3.5 G/DL (ref 2.3–3.5)
GLUCOSE SERPL-MCNC: 239 MG/DL (ref 70–99)
HCT VFR BLD AUTO: 38.1 % (ref 35.8–46.3)
HGB BLD-MCNC: 13.5 G/DL (ref 11.7–15.4)
IMM GRANULOCYTES # BLD AUTO: 0.01 K/UL (ref 0–0.5)
IMM GRANULOCYTES NFR BLD AUTO: 0.2 % (ref 0–5)
LOT NUMBER POC: NORMAL
LOT NUMBER SOLUTION: NORMAL
LOT NUMBER SWAB: NORMAL
LYMPHOCYTES # BLD: 1.73 K/UL (ref 0.5–4.6)
LYMPHOCYTES NFR BLD: 39.7 % (ref 13–44)
MCH RBC QN AUTO: 32.3 PG (ref 26.1–32.9)
MCHC RBC AUTO-ENTMCNC: 35.4 G/DL (ref 31.4–35)
MCV RBC AUTO: 91.1 FL (ref 82–102)
MONOCYTES # BLD: 0.29 K/UL (ref 0.1–1.3)
MONOCYTES NFR BLD: 6.7 % (ref 4–12)
NEUTS SEG # BLD: 2.3 K/UL (ref 1.7–8.2)
NEUTS SEG NFR BLD: 52.7 % (ref 43–78)
NRBC # BLD: 0 K/UL (ref 0–0.2)
PLATELET # BLD AUTO: 90 K/UL (ref 150–450)
PMV BLD AUTO: 12.9 FL (ref 9.4–12.3)
POTASSIUM SERPL-SCNC: 4 MMOL/L (ref 3.5–5.1)
PROT SERPL-MCNC: 7 G/DL (ref 6.3–8.2)
QUICKVUE INFLUENZA TEST: POSITIVE
RBC # BLD AUTO: 4.18 M/UL (ref 4.05–5.2)
SARS-COV-2 RNA, POC: NEGATIVE
SODIUM SERPL-SCNC: 142 MMOL/L (ref 136–145)
VALID INTERNAL CONTROL, POC: YES
WBC # BLD AUTO: 4.4 K/UL (ref 4.3–11.1)

## 2025-01-30 PROCEDURE — 87804 INFLUENZA ASSAY W/OPTIC: CPT | Performed by: PHYSICIAN ASSISTANT

## 2025-01-30 PROCEDURE — 99214 OFFICE O/P EST MOD 30 MIN: CPT | Performed by: PHYSICIAN ASSISTANT

## 2025-01-30 PROCEDURE — 1036F TOBACCO NON-USER: CPT | Performed by: PHYSICIAN ASSISTANT

## 2025-01-30 PROCEDURE — 87635 SARS-COV-2 COVID-19 AMP PRB: CPT | Performed by: PHYSICIAN ASSISTANT

## 2025-01-30 PROCEDURE — 3079F DIAST BP 80-89 MM HG: CPT | Performed by: PHYSICIAN ASSISTANT

## 2025-01-30 PROCEDURE — G8417 CALC BMI ABV UP PARAM F/U: HCPCS | Performed by: PHYSICIAN ASSISTANT

## 2025-01-30 PROCEDURE — G8427 DOCREV CUR MEDS BY ELIG CLIN: HCPCS | Performed by: PHYSICIAN ASSISTANT

## 2025-01-30 PROCEDURE — 71046 X-RAY EXAM CHEST 2 VIEWS: CPT

## 2025-01-30 PROCEDURE — 3017F COLORECTAL CA SCREEN DOC REV: CPT | Performed by: PHYSICIAN ASSISTANT

## 2025-01-30 PROCEDURE — 3074F SYST BP LT 130 MM HG: CPT | Performed by: PHYSICIAN ASSISTANT

## 2025-01-30 RX ORDER — GUAIFENESIN 600 MG/1
1200 TABLET, EXTENDED RELEASE ORAL 2 TIMES DAILY PRN
Qty: 60 TABLET | Refills: 0 | Status: SHIPPED | OUTPATIENT
Start: 2025-01-30

## 2025-01-30 RX ORDER — ACETAMINOPHEN 500 MG
500-1000 TABLET ORAL 3 TIMES DAILY PRN
Qty: 100 TABLET | Refills: 0 | Status: SHIPPED | OUTPATIENT
Start: 2025-01-30

## 2025-01-30 RX ORDER — OSELTAMIVIR PHOSPHATE 75 MG/1
75 CAPSULE ORAL 2 TIMES DAILY
Qty: 10 CAPSULE | Refills: 0 | Status: SHIPPED | OUTPATIENT
Start: 2025-01-30 | End: 2025-02-04

## 2025-01-30 RX ORDER — ALBUTEROL SULFATE 90 UG/1
2 INHALANT RESPIRATORY (INHALATION) 4 TIMES DAILY PRN
Qty: 18 G | Refills: 0 | Status: SHIPPED | OUTPATIENT
Start: 2025-01-30

## 2025-01-30 RX ORDER — BACLOFEN 20 MG/1
20 TABLET ORAL 3 TIMES DAILY
Qty: 270 TABLET | Refills: 1 | Status: SHIPPED | OUTPATIENT
Start: 2025-01-30 | End: 2025-07-29

## 2025-01-30 ASSESSMENT — ENCOUNTER SYMPTOMS
ALLERGIC/IMMUNOLOGIC NEGATIVE: 1
SHORTNESS OF BREATH: 0
ABDOMINAL PAIN: 0
EYES NEGATIVE: 1

## 2025-01-30 NOTE — PROGRESS NOTES
Ms Lorenzana is a f/u for chronic back pain related to adjacent segment arthritis and SI joint dysfunction. We have her on Baclofen 15mg TID for her back pain.   
Not on file    Years of education: Not on file    Highest education level: Not on file   Occupational History    Not on file   Tobacco Use    Smoking status: Former     Current packs/day: 0.00     Average packs/day: 2.0 packs/day for 32.9 years (65.8 ttl pk-yrs)     Types: Cigarettes     Start date: 1986     Quit date: 2018     Years since quittin.1    Smokeless tobacco: Never    Tobacco comments:     Use two smoke 2 packs a day before i quit   Vaping Use    Vaping status: Never Used   Substance and Sexual Activity    Alcohol use: No    Drug use: No    Sexual activity: Yes     Partners: Male     Birth control/protection: Surgical   Other Topics Concern    Not on file   Social History Narrative    , not working. 2 children, 5 grandchildren     Social Determinants of Health     Financial Resource Strain: Patient Declined (3/8/2024)    Overall Financial Resource Strain (CARDIA)     Difficulty of Paying Living Expenses: Patient declined   Food Insecurity: No Food Insecurity (2025)    Hunger Vital Sign     Worried About Running Out of Food in the Last Year: Never true     Ran Out of Food in the Last Year: Never true   Transportation Needs: No Transportation Needs (2025)    PRAPARE - Transportation     Lack of Transportation (Medical): No     Lack of Transportation (Non-Medical): No   Physical Activity: Insufficiently Active (2025)    Exercise Vital Sign     Days of Exercise per Week: 5 days     Minutes of Exercise per Session: 20 min   Stress: Not on file   Social Connections: Not on file   Intimate Partner Violence: Not on file   Housing Stability: Low Risk  (2025)    Housing Stability Vital Sign     Unable to Pay for Housing in the Last Year: No     Number of Times Moved in the Last Year: 0     Homeless in the Last Year: No            Allergies   Allergen Reactions    Demerol Hcl [Meperidine] Other (See Comments)     Increased agitation and confusion, restlessness

## 2025-01-30 NOTE — PROGRESS NOTES
Queenie Phillips PA-C, [facility], MPH  [facility]  [address], [address]  [place] [address]  Phone [phone]    SUBJECTIVE  Chief Complaint   Patient presents with    Influenza     Congestion, sob     HPI   Alyssa Lorenzana is a 49 y.o. female with PMH as below presents for an acute visit.     Pt complains of sickness x 8 days. States she started with vomiting (4-5 times per day) and diarrhea. Diarrhea has resolved. She continues to vomit- last episode was this morning. Currently, she is vomiting twice per day. She developed a cough about 4-5 days ago. Cough is productive of yellow phlegm. She has also been wheezing and SOB. States she has mid-sternal chest pain only when she is coughing. States she feels weak all over. States she is SOB all the time, which started 3-4 days ago. She has been using her 's inhaler, which helps with her SOB but not wheezing. She has not been using OTC meds.     States she has been around her daughter, who has the flu.     Note that pt is a smoker- quit in 2018. States she has been told she has \"early stage COPD.\" Note this is not on her problem list.    States she can eat and drink but has been vomiting about 2 times per day now.     Review of Systems  - denies hemoptysis, fever, chills, leg swelling or leg pain, dizziness, hematemesis, diarrhea, abdominal pain. Has mild sore throat. No ear pain but ears have been stopped up. Denies body aches, rhinorrhea, nasal congestion. Admits mild nausea     Patient Active Problem List   Diagnosis    Elevated LDL cholesterol level    JUS (generalized anxiety disorder)    Bicuspid aortic valve    Duodenal ulcer    Gastritis without bleeding    Essential hypertension    Bipolar 1 disorder, depressed, partial remission (HCC)    Ascending aortic aneurysm (HCC)    Insomnia, psychophysiological    Fatty liver    Smoking greater than 40 pack years    Sacroiliitis, not elsewhere classified (HCC)    Type

## 2025-02-07 ENCOUNTER — OFFICE VISIT (OUTPATIENT)
Dept: UROLOGY | Age: 50
End: 2025-02-07
Payer: MEDICARE

## 2025-02-07 DIAGNOSIS — R33.9 RETENTION OF URINE, UNSPECIFIED: ICD-10-CM

## 2025-02-07 DIAGNOSIS — N39.0 URINARY TRACT INFECTION WITHOUT HEMATURIA, SITE UNSPECIFIED: ICD-10-CM

## 2025-02-07 DIAGNOSIS — N20.0 RENAL STONE: ICD-10-CM

## 2025-02-07 DIAGNOSIS — R39.14 FEELING OF INCOMPLETE BLADDER EMPTYING: ICD-10-CM

## 2025-02-07 DIAGNOSIS — N35.92 STRICTURE OF FEMALE URETHRA, UNSPECIFIED STRICTURE TYPE: Primary | ICD-10-CM

## 2025-02-07 LAB
BILIRUBIN, URINE, POC: NEGATIVE
BLOOD URINE, POC: NEGATIVE
GLUCOSE URINE, POC: NEGATIVE MG/DL
KETONES, URINE, POC: NEGATIVE MG/DL
LEUKOCYTE ESTERASE, URINE, POC: NORMAL
NITRITE, URINE, POC: NEGATIVE
PH, URINE, POC: 6 (ref 4.6–8)
PROTEIN,URINE, POC: NEGATIVE MG/DL
SPECIFIC GRAVITY, URINE, POC: 1 (ref 1–1.03)
URINALYSIS CLARITY, POC: NORMAL
URINALYSIS COLOR, POC: NORMAL
UROBILINOGEN, POC: NORMAL MG/DL

## 2025-02-07 PROCEDURE — 99214 OFFICE O/P EST MOD 30 MIN: CPT | Performed by: NURSE PRACTITIONER

## 2025-02-07 PROCEDURE — 3017F COLORECTAL CA SCREEN DOC REV: CPT | Performed by: NURSE PRACTITIONER

## 2025-02-07 PROCEDURE — 81003 URINALYSIS AUTO W/O SCOPE: CPT | Performed by: NURSE PRACTITIONER

## 2025-02-07 PROCEDURE — G8417 CALC BMI ABV UP PARAM F/U: HCPCS | Performed by: NURSE PRACTITIONER

## 2025-02-07 PROCEDURE — 1036F TOBACCO NON-USER: CPT | Performed by: NURSE PRACTITIONER

## 2025-02-07 PROCEDURE — G8428 CUR MEDS NOT DOCUMENT: HCPCS | Performed by: NURSE PRACTITIONER

## 2025-02-07 RX ORDER — ESTRADIOL 0.1 MG/G
CREAM VAGINAL
Qty: 42.5 G | Refills: 5 | Status: SHIPPED | OUTPATIENT
Start: 2025-02-07

## 2025-02-07 RX ORDER — TAMSULOSIN HYDROCHLORIDE 0.4 MG/1
0.4 CAPSULE ORAL 2 TIMES DAILY
Qty: 180 CAPSULE | Refills: 3 | Status: SHIPPED | OUTPATIENT
Start: 2025-02-07

## 2025-02-07 ASSESSMENT — ENCOUNTER SYMPTOMS
NAUSEA: 0
BACK PAIN: 0

## 2025-02-07 NOTE — PROGRESS NOTES
to [place] emergency room on 17 with complaints of difficulty urinating. Bladder scan showed >600 mL urine in the bladder and a Mayo was placed at that time. Patient was discharged with urology follow-up. 8 pages of outside ER records personally reviewed today. She admits to a 30 years history of smoking up to 3 packs/day = 90 pyhx, now down to 1/2 ppd. No hematuria hx.            Past Medical History:   Diagnosis Date    ADHD (attention deficit hyperactivity disorder) 1980    Aneurysm ([facility])     Aorta aneurysm measured 4.8/4.7cm on 10/06/2021, monitoring at present time    Anxiety 10-16-91    Bicuspid aortic valve     Bipolar disorder ([facility])     CAD (coronary artery disease)     Echo on 2018 showed  EF 50-55%    Chronic pain 2016    Cigarette smoker 2016    Controlled type 2 diabetes mellitus without complication, with long-term current use of insulin ([facility]) 2018    Depression     Fatty liver 2022    Headache     Hypercholesterolemia 2018    Hypertension     manged with med    Insomnia 2016    Lumbar stenosis with neurogenic claudication 2022    Non-intractable vomiting 2021    Obesity (BMI 30-39.9) 2016    Psychiatric disorder     anxiety , ADHD    Status post lumbar spinal arthrodesis 2022    Type 2 diabetes mellitus ([facility]) 2022    Type 2 diabetes mellitus with peripheral vascular disease ([facility]) 2019    no med at present time (2022) corrected with weight loss    Unspecified adverse effect of anesthesia     high anxiety after surgery, hyper     Past Surgical History:   Procedure Laterality Date    APPENDECTOMY       SECTION      COLONOSCOPY N/A 2016    COLONOSCOPY  BMI 30 ROOM 603 performed by Carlos Leonard MD at [facility] ENDOSCOPY    COLONOSCOPY          CYSTOSCOPY  2011    bladder biopsy    CYSTOSCOPY N/A 2023    CYSTOSCOPY URETHRAL DILATATION performed by Nick Pickett Jr., MD at [facility] MAIN OR

## 2025-03-18 ENCOUNTER — OFFICE VISIT (OUTPATIENT)
Dept: NEUROLOGY | Age: 50
End: 2025-03-18

## 2025-03-18 ENCOUNTER — OFFICE VISIT (OUTPATIENT)
Dept: BEHAVIORAL/MENTAL HEALTH CLINIC | Age: 50
End: 2025-03-18
Payer: MEDICARE

## 2025-03-18 VITALS
HEART RATE: 86 BPM | DIASTOLIC BLOOD PRESSURE: 86 MMHG | OXYGEN SATURATION: 97 % | SYSTOLIC BLOOD PRESSURE: 128 MMHG | WEIGHT: 188 LBS | TEMPERATURE: 98.4 F | BODY MASS INDEX: 30.34 KG/M2

## 2025-03-18 VITALS — DIASTOLIC BLOOD PRESSURE: 83 MMHG | SYSTOLIC BLOOD PRESSURE: 134 MMHG

## 2025-03-18 DIAGNOSIS — F41.1 GAD (GENERALIZED ANXIETY DISORDER): ICD-10-CM

## 2025-03-18 DIAGNOSIS — G43.901 STATUS MIGRAINOSUS: Primary | ICD-10-CM

## 2025-03-18 DIAGNOSIS — F51.04 INSOMNIA, PSYCHOPHYSIOLOGICAL: ICD-10-CM

## 2025-03-18 DIAGNOSIS — M79.2 NEUROPATHIC PAIN: ICD-10-CM

## 2025-03-18 DIAGNOSIS — G89.29 CHRONIC MIDLINE LOW BACK PAIN WITHOUT SCIATICA: ICD-10-CM

## 2025-03-18 DIAGNOSIS — F31.75 BIPOLAR 1 DISORDER, DEPRESSED, PARTIAL REMISSION (HCC): Primary | ICD-10-CM

## 2025-03-18 DIAGNOSIS — M54.50 CHRONIC MIDLINE LOW BACK PAIN WITHOUT SCIATICA: ICD-10-CM

## 2025-03-18 PROCEDURE — G8427 DOCREV CUR MEDS BY ELIG CLIN: HCPCS | Performed by: NURSE PRACTITIONER

## 2025-03-18 PROCEDURE — 99215 OFFICE O/P EST HI 40 MIN: CPT | Performed by: NURSE PRACTITIONER

## 2025-03-18 PROCEDURE — 1036F TOBACCO NON-USER: CPT | Performed by: NURSE PRACTITIONER

## 2025-03-18 PROCEDURE — 3074F SYST BP LT 130 MM HG: CPT | Performed by: NURSE PRACTITIONER

## 2025-03-18 PROCEDURE — G8417 CALC BMI ABV UP PARAM F/U: HCPCS | Performed by: NURSE PRACTITIONER

## 2025-03-18 PROCEDURE — 3017F COLORECTAL CA SCREEN DOC REV: CPT | Performed by: NURSE PRACTITIONER

## 2025-03-18 PROCEDURE — 3079F DIAST BP 80-89 MM HG: CPT | Performed by: NURSE PRACTITIONER

## 2025-03-18 RX ORDER — BUPROPION HYDROCHLORIDE 300 MG/1
300 TABLET ORAL EVERY MORNING
Qty: 30 TABLET | Refills: 3 | Status: SHIPPED | OUTPATIENT
Start: 2025-03-18 | End: 2025-07-16

## 2025-03-18 RX ORDER — OLANZAPINE 10 MG/1
10 TABLET ORAL
Qty: 30 TABLET | Refills: 3 | Status: SHIPPED | OUTPATIENT
Start: 2025-03-18 | End: 2025-07-16

## 2025-03-18 RX ORDER — GABAPENTIN 300 MG/1
600 CAPSULE ORAL 3 TIMES DAILY
Qty: 180 CAPSULE | Refills: 5 | Status: SHIPPED | OUTPATIENT
Start: 2025-03-18 | End: 2025-09-14

## 2025-03-18 RX ORDER — BUSPIRONE HYDROCHLORIDE 15 MG/1
15 TABLET ORAL 2 TIMES DAILY
Qty: 60 TABLET | Refills: 3 | Status: SHIPPED | OUTPATIENT
Start: 2025-03-18 | End: 2025-07-16

## 2025-03-18 RX ORDER — ZOLPIDEM TARTRATE 10 MG/1
10 TABLET ORAL NIGHTLY PRN
Qty: 30 TABLET | Refills: 2 | Status: SHIPPED | OUTPATIENT
Start: 2025-04-05 | End: 2025-05-05

## 2025-03-18 RX ORDER — PROPRANOLOL HYDROCHLORIDE 40 MG/1
40 TABLET ORAL 3 TIMES DAILY PRN
Qty: 90 TABLET | Refills: 3 | Status: SHIPPED | OUTPATIENT
Start: 2025-03-18 | End: 2025-07-16

## 2025-03-18 NOTE — PROGRESS NOTES
128/86   Pulse: 86   Temp: 98.4 °F (36.9 °C)   SpO2: 97%       ROS:  Psychological ROS: negative for depression, loss of interest in favorite activities, mood swings, and sleep disturbance    Mental Status Exam:   Appearance  Appears stated age, Well-kept, Appropriately attired, Well-nourished, Cooperative, and Maintains eye-contact  Behavior  Cooperative  Psychomotor Activity within normal limits  Gait and Station Normal Laina and Station and Normal Balance  Speech   appropriate  Mood    is euthymic  Affect    calm  Thought Process Goal-Directed and Circumstantial  Thought Content/Perceptual Disturbances free of delusions, free of hallucinations, and not internally preoccupied  Cognition/Sensorium Alert, Fully oriented, Normal concentration/ Attention, Recent memory intact, Remote memory intact, and Fund of knowledge adequate for level of education  Insight  fair  Judgment fair  Assessment:    Alyssa was seen today for follow-up.    Diagnoses and all orders for this visit:    Bipolar 1 disorder, depressed, partial remission (HCC)  -     buPROPion (WELLBUTRIN XL) 300 MG extended release tablet; Take 1 tablet by mouth every morning  -     cariprazine hcl (VRAYLAR) 1.5 MG capsule; Take 1 capsule by mouth daily  -     OLANZapine (ZYPREXA) 10 MG tablet; Take 1 tablet by mouth nightly    JUS (generalized anxiety disorder)  -     busPIRone (BUSPAR) 15 MG tablet; Take 15 mg by mouth 2 times daily  -     buPROPion (WELLBUTRIN XL) 300 MG extended release tablet; Take 1 tablet by mouth every morning  -     propranolol (INDERAL) 40 MG tablet; Take 1 tablet by mouth 3 times daily as needed (anxiety)    Insomnia, psychophysiological  -     zolpidem (AMBIEN) 10 MG tablet; Take 1 tablet by mouth nightly as needed for Sleep (insomnia) for up to 30 days. Max Daily Amount: 10 mg    Chronic midline low back pain without sciatica  -     gabapentin (NEURONTIN) 300 MG capsule; Take 2 capsules by mouth 3 times daily for 180

## 2025-03-18 NOTE — PROGRESS NOTES
Signature: Kenyetta Delarosa MD  [facility]   [address] [place] [address], [address]  [place] [address]  Ph: [phone]  Fax: [phone]       I have personally interviewed and examined patient and I have personally reviewed all relevant records including labs and imaging as noted above. I have written all aspects of this note. More than 50% of this time was used for counseling regarding my diagnosis, prognosis, and plans for management.   Procedure Time: 20 minutes  Total visit time (including procedures): 30 minutes

## 2025-06-06 ENCOUNTER — TELEPHONE (OUTPATIENT)
Dept: NEUROLOGY | Age: 50
End: 2025-06-06

## 2025-06-06 NOTE — TELEPHONE ENCOUNTER
[facility] called for a prescription for Botox 200 units.

## (undated) DEVICE — Z DUP USE 2701075 SYSTEM SKIN CLSR 42CM DERMBND PRINEO

## (undated) DEVICE — GOWN,REINFORCED,POLY,AURORA,XXLARGE,STR: Brand: MEDLINE

## (undated) DEVICE — POSTERIOR LAMI VANPLT-LUCAS: Brand: MEDLINE INDUSTRIES, INC.

## (undated) DEVICE — MASTISOL ADHESIVE LIQ 2/3ML

## (undated) DEVICE — SUTURE VCRL SZ 2-0 L27IN ABSRB UD L36MM CP-1 1/2 CIR REV J266H

## (undated) DEVICE — SHEET, T, LAPAROTOMY, STERILE: Brand: MEDLINE

## (undated) DEVICE — DRILL BIT: Brand: XIA 4.5 SYSTEM -  XIA CT

## (undated) DEVICE — GLOVE SURG SZ 8 CRM LTX FREE POLYISOPRENE POLYMER BEAD ANTI

## (undated) DEVICE — CONTAINER,SPECIMEN,O.R.STRL,4.5OZ: Brand: MEDLINE

## (undated) DEVICE — BIPOLAR SEALER 23-113-1 AQM 2.3 OM NEURO: Brand: AQUAMANTYS ®

## (undated) DEVICE — SOLUTION IRRIG 3000ML H2O STRL BAG

## (undated) DEVICE — TRAY,URINE METER,100% SILICONE,16FR10ML: Brand: MEDLINE

## (undated) DEVICE — 2000CC GUARDIAN II: Brand: GUARDIAN

## (undated) DEVICE — ABSORBENT, WATERPROOF, BACTERIA PROOF FILM DRESSING: Brand: OPSITE POST OP 20X10CM CTN 20

## (undated) DEVICE — SUTURE VCRL SZ 1 L27IN ABSRB UD L36MM CP-1 1/2 CIR REV CUT J268H

## (undated) DEVICE — SUTURE VCRL + 3-0 L27IN ABSRB UD PS-2 L19MM 3/8 CIR PRIM VCP427H

## (undated) DEVICE — PACK SURGICAL PROCEDURE KIT CYSTOSCOPY TOTE

## (undated) DEVICE — AGENT HEMSTAT 8ML FLX TIP MTRX + DISP SURGIFLO

## (undated) DEVICE — STERILE PACKAGE WITH CANNULA: Brand: LITE BIO DELIVERY SYSTEM

## (undated) DEVICE — MODULAR TAP: Brand: XIA 4.5 SYSTEM -  XIA CT

## (undated) DEVICE — SOLUTION IRRIG 1000ML 09% SOD CHL USP PIC PLAS CONTAINER